# Patient Record
Sex: MALE | Race: BLACK OR AFRICAN AMERICAN | NOT HISPANIC OR LATINO | Employment: FULL TIME | URBAN - METROPOLITAN AREA
[De-identification: names, ages, dates, MRNs, and addresses within clinical notes are randomized per-mention and may not be internally consistent; named-entity substitution may affect disease eponyms.]

---

## 2021-05-06 ENCOUNTER — PATIENT MESSAGE (OUTPATIENT)
Dept: RESEARCH | Facility: HOSPITAL | Age: 41
End: 2021-05-06

## 2021-07-01 ENCOUNTER — PATIENT MESSAGE (OUTPATIENT)
Dept: ADMINISTRATIVE | Facility: OTHER | Age: 41
End: 2021-07-01

## 2021-11-15 PROBLEM — S39.012A STRAIN OF LUMBAR PARASPINAL MUSCLE, INITIAL ENCOUNTER: Status: ACTIVE | Noted: 2021-11-15

## 2021-11-15 PROBLEM — M54.16 LUMBAR RADICULOPATHY, ACUTE: Status: ACTIVE | Noted: 2021-11-15

## 2021-12-16 ENCOUNTER — HOSPITAL ENCOUNTER (EMERGENCY)
Facility: HOSPITAL | Age: 41
Discharge: HOME | End: 2021-12-16
Attending: STUDENT IN AN ORGANIZED HEALTH CARE EDUCATION/TRAINING PROGRAM
Payer: MEDICAID

## 2021-12-16 VITALS
WEIGHT: 315 LBS | RESPIRATION RATE: 18 BRPM | OXYGEN SATURATION: 99 % | TEMPERATURE: 98 F | BODY MASS INDEX: 73.94 KG/M2 | DIASTOLIC BLOOD PRESSURE: 80 MMHG | HEART RATE: 91 BPM | SYSTOLIC BLOOD PRESSURE: 149 MMHG

## 2021-12-16 DIAGNOSIS — R06.02 SOB (SHORTNESS OF BREATH): Primary | ICD-10-CM

## 2021-12-16 DIAGNOSIS — J40 BRONCHITIS: ICD-10-CM

## 2021-12-16 LAB
ALBUMIN SERPL BCP-MCNC: 3.4 G/DL (ref 3.5–5.2)
ALP SERPL-CCNC: 86 U/L (ref 55–135)
ALT SERPL W/O P-5'-P-CCNC: 41 U/L (ref 10–44)
ANION GAP SERPL CALC-SCNC: 7 MMOL/L (ref 8–16)
AST SERPL-CCNC: 17 U/L (ref 10–40)
BASOPHILS # BLD AUTO: 0.06 K/UL (ref 0–0.2)
BASOPHILS NFR BLD: 0.9 % (ref 0–1.9)
BILIRUB SERPL-MCNC: 0.3 MG/DL (ref 0.1–1)
BILIRUB UR QL STRIP: NEGATIVE
BNP SERPL-MCNC: <10 PG/ML (ref 0–99)
BUN SERPL-MCNC: 13 MG/DL (ref 6–20)
CALCIUM SERPL-MCNC: 9.1 MG/DL (ref 8.7–10.5)
CHLORIDE SERPL-SCNC: 105 MMOL/L (ref 95–110)
CLARITY UR: CLEAR
CO2 SERPL-SCNC: 28 MMOL/L (ref 23–29)
COLOR UR: YELLOW
CREAT SERPL-MCNC: 1.1 MG/DL (ref 0.5–1.4)
DIFFERENTIAL METHOD: ABNORMAL
EOSINOPHIL # BLD AUTO: 0.2 K/UL (ref 0–0.5)
EOSINOPHIL NFR BLD: 2.5 % (ref 0–8)
ERYTHROCYTE [DISTWIDTH] IN BLOOD BY AUTOMATED COUNT: 13.2 % (ref 11.5–14.5)
EST. GFR  (AFRICAN AMERICAN): >60 ML/MIN/1.73 M^2
EST. GFR  (NON AFRICAN AMERICAN): >60 ML/MIN/1.73 M^2
GLUCOSE SERPL-MCNC: 213 MG/DL (ref 70–110)
GLUCOSE UR QL STRIP: NEGATIVE
HCT VFR BLD AUTO: 43.2 % (ref 40–54)
HGB BLD-MCNC: 13.6 G/DL (ref 14–18)
HGB UR QL STRIP: ABNORMAL
IMM GRANULOCYTES # BLD AUTO: 0.03 K/UL (ref 0–0.04)
IMM GRANULOCYTES NFR BLD AUTO: 0.4 % (ref 0–0.5)
INFLUENZA A, MOLECULAR: NEGATIVE
INFLUENZA B, MOLECULAR: NEGATIVE
KETONES UR QL STRIP: NEGATIVE
LEUKOCYTE ESTERASE UR QL STRIP: NEGATIVE
LYMPHOCYTES # BLD AUTO: 2.2 K/UL (ref 1–4.8)
LYMPHOCYTES NFR BLD: 31.5 % (ref 18–48)
MCH RBC QN AUTO: 28.4 PG (ref 27–31)
MCHC RBC AUTO-ENTMCNC: 31.5 G/DL (ref 32–36)
MCV RBC AUTO: 90 FL (ref 82–98)
MONOCYTES # BLD AUTO: 0.5 K/UL (ref 0.3–1)
MONOCYTES NFR BLD: 7.3 % (ref 4–15)
NEUTROPHILS # BLD AUTO: 4 K/UL (ref 1.8–7.7)
NEUTROPHILS NFR BLD: 57.4 % (ref 38–73)
NITRITE UR QL STRIP: NEGATIVE
NRBC BLD-RTO: 0 /100 WBC
PH UR STRIP: 6 [PH] (ref 5–8)
PLATELET # BLD AUTO: 225 K/UL (ref 150–450)
PMV BLD AUTO: 10.4 FL (ref 9.2–12.9)
POTASSIUM SERPL-SCNC: 4.2 MMOL/L (ref 3.5–5.1)
PROT SERPL-MCNC: 7.1 G/DL (ref 6–8.4)
PROT UR QL STRIP: NEGATIVE
RBC # BLD AUTO: 4.79 M/UL (ref 4.6–6.2)
SARS-COV-2 RDRP RESP QL NAA+PROBE: NEGATIVE
SODIUM SERPL-SCNC: 140 MMOL/L (ref 136–145)
SP GR UR STRIP: 1.02 (ref 1–1.03)
SPECIMEN SOURCE: NORMAL
URN SPEC COLLECT METH UR: ABNORMAL
UROBILINOGEN UR STRIP-ACNC: NEGATIVE EU/DL
WBC # BLD AUTO: 6.88 K/UL (ref 3.9–12.7)

## 2021-12-16 PROCEDURE — 99285 EMERGENCY DEPT VISIT HI MDM: CPT | Mod: 25

## 2021-12-16 PROCEDURE — 87502 INFLUENZA DNA AMP PROBE: CPT | Performed by: STUDENT IN AN ORGANIZED HEALTH CARE EDUCATION/TRAINING PROGRAM

## 2021-12-16 PROCEDURE — 85025 COMPLETE CBC W/AUTO DIFF WBC: CPT | Performed by: NURSE PRACTITIONER

## 2021-12-16 PROCEDURE — U0002 COVID-19 LAB TEST NON-CDC: HCPCS | Performed by: STUDENT IN AN ORGANIZED HEALTH CARE EDUCATION/TRAINING PROGRAM

## 2021-12-16 PROCEDURE — 93005 ELECTROCARDIOGRAM TRACING: CPT

## 2021-12-16 PROCEDURE — 81003 URINALYSIS AUTO W/O SCOPE: CPT | Performed by: STUDENT IN AN ORGANIZED HEALTH CARE EDUCATION/TRAINING PROGRAM

## 2021-12-16 PROCEDURE — 93010 EKG 12-LEAD: ICD-10-PCS | Mod: ,,, | Performed by: INTERNAL MEDICINE

## 2021-12-16 PROCEDURE — 83880 ASSAY OF NATRIURETIC PEPTIDE: CPT | Performed by: NURSE PRACTITIONER

## 2021-12-16 PROCEDURE — 36415 COLL VENOUS BLD VENIPUNCTURE: CPT | Performed by: NURSE PRACTITIONER

## 2021-12-16 PROCEDURE — 93010 ELECTROCARDIOGRAM REPORT: CPT | Mod: ,,, | Performed by: INTERNAL MEDICINE

## 2021-12-16 PROCEDURE — 80053 COMPREHEN METABOLIC PANEL: CPT | Performed by: NURSE PRACTITIONER

## 2021-12-16 RX ORDER — PREDNISONE 50 MG/1
50 TABLET ORAL DAILY
Qty: 5 TABLET | Refills: 0 | Status: SHIPPED | OUTPATIENT
Start: 2021-12-16 | End: 2021-12-21

## 2021-12-16 RX ORDER — IBUPROFEN 600 MG/1
600 TABLET ORAL EVERY 6 HOURS PRN
Qty: 20 TABLET | Refills: 0 | Status: SHIPPED | OUTPATIENT
Start: 2021-12-16 | End: 2021-12-16 | Stop reason: SDUPTHER

## 2021-12-16 RX ORDER — IBUPROFEN 600 MG/1
600 TABLET ORAL EVERY 6 HOURS PRN
Qty: 20 TABLET | Refills: 0 | Status: ON HOLD | OUTPATIENT
Start: 2021-12-16 | End: 2022-02-09 | Stop reason: HOSPADM

## 2021-12-30 ENCOUNTER — HOSPITAL ENCOUNTER (EMERGENCY)
Facility: HOSPITAL | Age: 41
Discharge: HOME | End: 2021-12-30
Attending: STUDENT IN AN ORGANIZED HEALTH CARE EDUCATION/TRAINING PROGRAM
Payer: MEDICAID

## 2021-12-30 VITALS
OXYGEN SATURATION: 98 % | BODY MASS INDEX: 73.58 KG/M2 | DIASTOLIC BLOOD PRESSURE: 95 MMHG | RESPIRATION RATE: 22 BRPM | HEART RATE: 106 BPM | WEIGHT: 315 LBS | TEMPERATURE: 98 F | SYSTOLIC BLOOD PRESSURE: 178 MMHG

## 2021-12-30 DIAGNOSIS — R79.89 ELEVATED D-DIMER: ICD-10-CM

## 2021-12-30 DIAGNOSIS — R06.02 SHORTNESS OF BREATH: ICD-10-CM

## 2021-12-30 LAB
ALBUMIN SERPL BCP-MCNC: 4 G/DL (ref 3.5–5.2)
ALP SERPL-CCNC: 109 U/L (ref 55–135)
ALT SERPL W/O P-5'-P-CCNC: 65 U/L (ref 10–44)
ANION GAP SERPL CALC-SCNC: 11 MMOL/L (ref 8–16)
AST SERPL-CCNC: 18 U/L (ref 10–40)
BASOPHILS # BLD AUTO: 0.06 K/UL (ref 0–0.2)
BASOPHILS NFR BLD: 0.6 % (ref 0–1.9)
BILIRUB SERPL-MCNC: 0.5 MG/DL (ref 0.1–1)
BNP SERPL-MCNC: 13 PG/ML (ref 0–99)
BUN SERPL-MCNC: 14 MG/DL (ref 6–20)
CALCIUM SERPL-MCNC: 10.2 MG/DL (ref 8.7–10.5)
CHLORIDE SERPL-SCNC: 103 MMOL/L (ref 95–110)
CO2 SERPL-SCNC: 27 MMOL/L (ref 23–29)
CREAT SERPL-MCNC: 1.2 MG/DL (ref 0.5–1.4)
D DIMER PPP IA.FEU-MCNC: 1.64 MG/L FEU
DIFFERENTIAL METHOD: NORMAL
EOSINOPHIL # BLD AUTO: 0.1 K/UL (ref 0–0.5)
EOSINOPHIL NFR BLD: 1.3 % (ref 0–8)
ERYTHROCYTE [DISTWIDTH] IN BLOOD BY AUTOMATED COUNT: 13.4 % (ref 11.5–14.5)
EST. GFR  (AFRICAN AMERICAN): >60 ML/MIN/1.73 M^2
EST. GFR  (NON AFRICAN AMERICAN): >60 ML/MIN/1.73 M^2
GLUCOSE SERPL-MCNC: 94 MG/DL (ref 70–110)
HCT VFR BLD AUTO: 46.2 % (ref 40–54)
HGB BLD-MCNC: 14.8 G/DL (ref 14–18)
IMM GRANULOCYTES # BLD AUTO: 0.04 K/UL (ref 0–0.04)
IMM GRANULOCYTES NFR BLD AUTO: 0.4 % (ref 0–0.5)
INFLUENZA A, MOLECULAR: NEGATIVE
INFLUENZA B, MOLECULAR: NEGATIVE
LYMPHOCYTES # BLD AUTO: 2.5 K/UL (ref 1–4.8)
LYMPHOCYTES NFR BLD: 23.5 % (ref 18–48)
MCH RBC QN AUTO: 28.6 PG (ref 27–31)
MCHC RBC AUTO-ENTMCNC: 32 G/DL (ref 32–36)
MCV RBC AUTO: 89 FL (ref 82–98)
MONOCYTES # BLD AUTO: 0.7 K/UL (ref 0.3–1)
MONOCYTES NFR BLD: 6.8 % (ref 4–15)
NEUTROPHILS # BLD AUTO: 7.1 K/UL (ref 1.8–7.7)
NEUTROPHILS NFR BLD: 67.4 % (ref 38–73)
NRBC BLD-RTO: 0 /100 WBC
PLATELET # BLD AUTO: 280 K/UL (ref 150–450)
PMV BLD AUTO: 10.5 FL (ref 9.2–12.9)
POTASSIUM SERPL-SCNC: 4.4 MMOL/L (ref 3.5–5.1)
PROT SERPL-MCNC: 8.3 G/DL (ref 6–8.4)
RBC # BLD AUTO: 5.18 M/UL (ref 4.6–6.2)
SARS-COV-2 RDRP RESP QL NAA+PROBE: NEGATIVE
SODIUM SERPL-SCNC: 141 MMOL/L (ref 136–145)
SPECIMEN SOURCE: NORMAL
TROPONIN I SERPL DL<=0.01 NG/ML-MCNC: 0.01 NG/ML (ref 0–0.03)
WBC # BLD AUTO: 10.51 K/UL (ref 3.9–12.7)

## 2021-12-30 PROCEDURE — 80053 COMPREHEN METABOLIC PANEL: CPT | Performed by: STUDENT IN AN ORGANIZED HEALTH CARE EDUCATION/TRAINING PROGRAM

## 2021-12-30 PROCEDURE — U0002 COVID-19 LAB TEST NON-CDC: HCPCS | Performed by: STUDENT IN AN ORGANIZED HEALTH CARE EDUCATION/TRAINING PROGRAM

## 2021-12-30 PROCEDURE — 85025 COMPLETE CBC W/AUTO DIFF WBC: CPT | Performed by: STUDENT IN AN ORGANIZED HEALTH CARE EDUCATION/TRAINING PROGRAM

## 2021-12-30 PROCEDURE — 99900035 HC TECH TIME PER 15 MIN (STAT)

## 2021-12-30 PROCEDURE — 85379 FIBRIN DEGRADATION QUANT: CPT | Performed by: STUDENT IN AN ORGANIZED HEALTH CARE EDUCATION/TRAINING PROGRAM

## 2021-12-30 PROCEDURE — 96372 THER/PROPH/DIAG INJ SC/IM: CPT

## 2021-12-30 PROCEDURE — 84484 ASSAY OF TROPONIN QUANT: CPT | Performed by: STUDENT IN AN ORGANIZED HEALTH CARE EDUCATION/TRAINING PROGRAM

## 2021-12-30 PROCEDURE — 63600175 PHARM REV CODE 636 W HCPCS: Performed by: STUDENT IN AN ORGANIZED HEALTH CARE EDUCATION/TRAINING PROGRAM

## 2021-12-30 PROCEDURE — 87502 INFLUENZA DNA AMP PROBE: CPT | Performed by: STUDENT IN AN ORGANIZED HEALTH CARE EDUCATION/TRAINING PROGRAM

## 2021-12-30 PROCEDURE — 83880 ASSAY OF NATRIURETIC PEPTIDE: CPT | Performed by: STUDENT IN AN ORGANIZED HEALTH CARE EDUCATION/TRAINING PROGRAM

## 2021-12-30 PROCEDURE — 93005 ELECTROCARDIOGRAM TRACING: CPT

## 2021-12-30 PROCEDURE — 99285 EMERGENCY DEPT VISIT HI MDM: CPT | Mod: 25

## 2021-12-30 PROCEDURE — 94640 AIRWAY INHALATION TREATMENT: CPT

## 2021-12-30 PROCEDURE — 93010 ELECTROCARDIOGRAM REPORT: CPT | Mod: ,,, | Performed by: INTERNAL MEDICINE

## 2021-12-30 PROCEDURE — 93010 EKG 12-LEAD: ICD-10-PCS | Mod: ,,, | Performed by: INTERNAL MEDICINE

## 2021-12-30 PROCEDURE — 25000242 PHARM REV CODE 250 ALT 637 W/ HCPCS: Performed by: STUDENT IN AN ORGANIZED HEALTH CARE EDUCATION/TRAINING PROGRAM

## 2021-12-30 PROCEDURE — 99900031 HC PATIENT EDUCATION (STAT)

## 2021-12-30 RX ORDER — IPRATROPIUM BROMIDE AND ALBUTEROL SULFATE 2.5; .5 MG/3ML; MG/3ML
3 SOLUTION RESPIRATORY (INHALATION)
Status: COMPLETED | OUTPATIENT
Start: 2021-12-30 | End: 2021-12-30

## 2021-12-30 RX ORDER — METHYLPREDNISOLONE SOD SUCC 125 MG
125 VIAL (EA) INJECTION
Status: COMPLETED | OUTPATIENT
Start: 2021-12-30 | End: 2021-12-30

## 2021-12-30 RX ADMIN — METHYLPREDNISOLONE SODIUM SUCCINATE 125 MG: 125 INJECTION, POWDER, FOR SOLUTION INTRAMUSCULAR; INTRAVENOUS at 05:12

## 2021-12-30 RX ADMIN — IPRATROPIUM BROMIDE AND ALBUTEROL SULFATE 3 ML: .5; 3 SOLUTION RESPIRATORY (INHALATION) at 05:12

## 2021-12-30 RX ADMIN — IPRATROPIUM BROMIDE AND ALBUTEROL SULFATE 3 ML: .5; 3 SOLUTION RESPIRATORY (INHALATION) at 04:12

## 2021-12-30 NOTE — ED PROVIDER NOTES
"[facility]                                                  Chief Complaint     Chief Complaint   Patient presents with    Shortness of Breath     Pt states he was told he had "untreated pneumonia or bronchitis" a week ago - reports he does not feel he is getting better      Back Pain       History of Present Illness  41 y.o. male with past medical history of hypertension and morbid obesity who presents with R mid back pain and shortness of breath x 1 week. He was seen at [facility] on 12/14 and CT-PE study was obtained, revealing no evidence of PE. He presented here to [facility] on 12/16 for re-evaluation with similar symptoms. He was given steroid burst and anti-inflammatory medications. Since then, his symptoms have persisted.  His shortness of breath is intermittent, worsens with exertion and has been worsening since onset. His back pain is 10/10 in severity, intermittent, aching. His back pain improves when he lies on his stomach. He states that the back pain this feels like his previous kidney stones and endorses associated increased urinary frequency for the past 3 days. Denies fever, chills, nausea, vomiting, abdominal pain, constipation, or bloody stools.      History obtained from:  Patient    Review of patient's allergies indicates:  No Known Allergies  Past Medical History:   Diagnosis Date    Hypertension      Past Surgical History:   Procedure Laterality Date    ANKLE SURGERY Left       Family History   Problem Relation Age of Onset    Diabetes Mellitus Mother     Diabetes Mellitus Father     Hypertension Father     Stroke Paternal Grandfather      Social History     Tobacco Use    Smoking status: Current Every Day Smoker     Years: 19.00     Types: Cigars     Start date: 3/4/1996    Smokeless tobacco: Never Used    Tobacco comment: pt stated smokes 5 cigars/day   Substance Use Topics    Alcohol use: Yes     Alcohol/week: 0.0 standard drinks     Comment: " occasional    Drug use: No     Review of Systems and Physical Exam     +back pain, shortness of breath, urinary frequency    All other symptoms negative as mentioned below    Review of Systems  --Constitutional - Denies fever, appetite change, chills  --Eyes - Denies eye discharge, eye pain, eye redness or visual disturbance  --HENT- Denies congestion, sore throat, drooling, ear discharge, rhinorrhea or trouble swallowing  --Respiratory - Denies cough, wheezing  --Cardiovascular - Denies chest pain, leg swelling, palpitations  --Gastrointestinal - Denies abdominal pain, abdominal distension, constipation, diarrhea, nausea or vomiting  --Genitourinary - Denies difficulty urinating, dysuria, hematuria, urgency  --Musculoskeletal - Denies arthralgias, myalgias  --Neurological - Denies dizziness, headaches, lightheadedness, weakness  --Hematologic - Denies easy bruising or easy bleeding  --Skin - Denies rash, wound or pallor  --Psychiatric- Denies dysphoric mood, nervousness/anxiety    Vital Signs   weight is 226 kg (498 lb 3.8 oz) (abnormal). His temperature is 97.9 °F (36.6 °C). His blood pressure is 178/95 (abnormal) and his pulse is 106. His respiration is 22 (abnormal) and oxygen saturation is 98%.      Physical Exam   Nursing note and vitals reviewed  --Constitutional:  Well developed, well nourished  --HENT: Normocephalic, atraumatic. No rhinorrhea. Moist oral mucosa. No oropharyngeal edema, erythema or exudates.   --Eyes: PERRL. Extraocular movements intact. No periorbital swelling. Normal conjunctiva.  --Neck: Normal range of motion. Neck supple. No adenopathy  --Cardiac: Regular rhythm, normal S1, normal S2, no murmur, normal rate, intact distal pulses  --Pulmonary: Normal respiratory effort, breath sounds normal and equal bilaterally, no accessory muscle use, no respiratory distress  --Abdominal: Soft, normal bowel sounds, no tenderness, no guarding, no rebound  --Musculoskeletal: Normal range of motion. No  deformity, tenderness or edema.  --Neurological:  Alert and oriented x 4. Follows commands appropriately.    --Skin: Warm and dry. No rash, pallor, cyanosis or jaundice.  --Psych: Normal mood    ED Course     Lab Results (reviewed by the physician)  Labs Reviewed   D DIMER, QUANTITATIVE - Abnormal; Notable for the following components:       Result Value    D-Dimer 1.64 (*)     All other components within normal limits   COMPREHENSIVE METABOLIC PANEL - Abnormal; Notable for the following components:    ALT 65 (*)     All other components within normal limits   INFLUENZA A & B BY MOLECULAR   SARS-COV-2 RNA AMPLIFICATION, QUAL   B-TYPE NATRIURETIC PEPTIDE   TROPONIN I   CBC W/ AUTO DIFFERENTIAL     Vent. Rate : 101 BPM     Atrial Rate : 101 BPM      P-R Int : 144 ms          QRS Dur : 082 ms       QT Int : 394 ms       P-R-T Axes : 041 057 027 degrees      QTc Int : 510 ms     Sinus tachycardia   Otherwise normal ECG   When compared with ECG of 16-DEC-2021 14:15,     Nonspecific T wave abnormality, improved in Inferior leads   Nonspecific T wave abnormality has replaced inverted T waves in Lateral   leads     EKG (interpreted by the physician)  Rate: 101 bpm  Rhythm: Sinus Tachycardia  Axis: Normal  QTc interval: 510 ms  ST-segments: No elevation or depression  Overall Interpretation: Sinus Tachycardia    Radiology (images visualized & reports reviewed by the physician)  X-Ray Chest 1 View   Final Result      No acute process.         Electronically signed by: Eduard Pantoja MD   Date:    12/30/2021   Time:    17:24          Medications Given  Medications   methylPREDNISolone sodium succinate injection 125 mg (125 mg Intramuscular Given 12/30/21 1707)   albuterol-ipratropium 2.5 mg-0.5 mg/3 mL nebulizer solution 3 mL (3 mLs Nebulization Given by Other 12/30/21 1708)       Differential Diagnosis:  Asthma, COPD,  cardiogenic pulmonary edema, noncardiogenic pulmonary edema, pneumonia, myocardial infarction, cardiac  tamponade    Clinical Tests:  Lab Tests: Ordered and reviewed  Radiological Study: Ordered and reviewed  Medical Tests: Ordered and reviewed    ED Management  Vitals with hypertension, tachypnea and tachycardia. Physical exam with clear lung sounds, normal work of breathing and no respiratory distress. Labs with elevated d-dimer but otherwise unremarkable. CXR unremarkable. Considering that patient could not fit in the CT-Scanner, bilateral lower extremity ultrasound was obtained to evaluate for DVT and assess need for anticoagulation. Care was handed off to Dr. Ramos.     Disposition and Plan  Condition: Stable  Disposition: Handed off to Dr. Rachel Argueta MD  01/05/22 0128

## 2021-12-30 NOTE — ED TRIAGE NOTES
41 y.o. male presents to ER   Chief Complaint   Patient presents with    Shortness of Breath     Pt recently dx with pneumonia a week ago - reports he does not feel he is getting better      Back Pain   Reports pain to R side of back. No acute distress noted.

## 2022-01-05 ENCOUNTER — PATIENT OUTREACH (OUTPATIENT)
Dept: EMERGENCY MEDICINE | Facility: HOSPITAL | Age: 42
End: 2022-01-05
Payer: MEDICAID

## 2022-01-05 NOTE — PROGRESS NOTES
Patient stated he is actually going get some tests done tomorrow. Patient declined ED navigation assessment and denied any needs/resources at this time.     Aysha More  ED Navigator- [place]

## 2022-02-06 ENCOUNTER — HOSPITAL ENCOUNTER (INPATIENT)
Facility: HOSPITAL | Age: 42
LOS: 3 days | Discharge: HOME | DRG: 300 | End: 2022-02-09
Attending: STUDENT IN AN ORGANIZED HEALTH CARE EDUCATION/TRAINING PROGRAM | Admitting: FAMILY MEDICINE
Payer: MEDICAID

## 2022-02-06 DIAGNOSIS — I82.453 ACUTE DEEP VEIN THROMBOSIS (DVT) OF BOTH PERONEAL VEINS: Primary | ICD-10-CM

## 2022-02-06 DIAGNOSIS — R79.89 ELEVATED D-DIMER: ICD-10-CM

## 2022-02-06 DIAGNOSIS — R06.02 SHORTNESS OF BREATH: ICD-10-CM

## 2022-02-06 DIAGNOSIS — R06.02 SOB (SHORTNESS OF BREATH): ICD-10-CM

## 2022-02-06 LAB
BASOPHILS # BLD AUTO: 0.04 K/UL (ref 0–0.2)
BASOPHILS NFR BLD: 0.4 % (ref 0–1.9)
BNP SERPL-MCNC: 29 PG/ML (ref 0–99)
DIFFERENTIAL METHOD: ABNORMAL
EOSINOPHIL # BLD AUTO: 0.2 K/UL (ref 0–0.5)
EOSINOPHIL NFR BLD: 1.8 % (ref 0–8)
ERYTHROCYTE [DISTWIDTH] IN BLOOD BY AUTOMATED COUNT: 13.9 % (ref 11.5–14.5)
HCT VFR BLD AUTO: 45.2 % (ref 40–54)
HGB BLD-MCNC: 14.4 G/DL (ref 14–18)
IMM GRANULOCYTES # BLD AUTO: 0.04 K/UL (ref 0–0.04)
IMM GRANULOCYTES NFR BLD AUTO: 0.4 % (ref 0–0.5)
INFLUENZA A, MOLECULAR: NEGATIVE
INFLUENZA B, MOLECULAR: NEGATIVE
LYMPHOCYTES # BLD AUTO: 2.4 K/UL (ref 1–4.8)
LYMPHOCYTES NFR BLD: 25.3 % (ref 18–48)
MCH RBC QN AUTO: 28.2 PG (ref 27–31)
MCHC RBC AUTO-ENTMCNC: 31.9 G/DL (ref 32–36)
MCV RBC AUTO: 89 FL (ref 82–98)
MONOCYTES # BLD AUTO: 0.6 K/UL (ref 0.3–1)
MONOCYTES NFR BLD: 6.3 % (ref 4–15)
NEUTROPHILS # BLD AUTO: 6.2 K/UL (ref 1.8–7.7)
NEUTROPHILS NFR BLD: 65.8 % (ref 38–73)
NRBC BLD-RTO: 0 /100 WBC
PLATELET # BLD AUTO: 287 K/UL (ref 150–450)
PMV BLD AUTO: 11.3 FL (ref 9.2–12.9)
RBC # BLD AUTO: 5.11 M/UL (ref 4.6–6.2)
SARS-COV-2 RDRP RESP QL NAA+PROBE: NEGATIVE
SPECIMEN SOURCE: NORMAL
WBC # BLD AUTO: 9.45 K/UL (ref 3.9–12.7)

## 2022-02-06 PROCEDURE — 96372 THER/PROPH/DIAG INJ SC/IM: CPT | Mod: 59

## 2022-02-06 PROCEDURE — 83880 ASSAY OF NATRIURETIC PEPTIDE: CPT | Performed by: STUDENT IN AN ORGANIZED HEALTH CARE EDUCATION/TRAINING PROGRAM

## 2022-02-06 PROCEDURE — 99291 CRITICAL CARE FIRST HOUR: CPT | Mod: 25

## 2022-02-06 PROCEDURE — 63600175 PHARM REV CODE 636 W HCPCS: Performed by: STUDENT IN AN ORGANIZED HEALTH CARE EDUCATION/TRAINING PROGRAM

## 2022-02-06 PROCEDURE — 80053 COMPREHEN METABOLIC PANEL: CPT | Performed by: STUDENT IN AN ORGANIZED HEALTH CARE EDUCATION/TRAINING PROGRAM

## 2022-02-06 PROCEDURE — 93010 ELECTROCARDIOGRAM REPORT: CPT | Mod: ,,, | Performed by: INTERNAL MEDICINE

## 2022-02-06 PROCEDURE — 84484 ASSAY OF TROPONIN QUANT: CPT | Performed by: STUDENT IN AN ORGANIZED HEALTH CARE EDUCATION/TRAINING PROGRAM

## 2022-02-06 PROCEDURE — U0002 COVID-19 LAB TEST NON-CDC: HCPCS | Performed by: STUDENT IN AN ORGANIZED HEALTH CARE EDUCATION/TRAINING PROGRAM

## 2022-02-06 PROCEDURE — 87502 INFLUENZA DNA AMP PROBE: CPT | Performed by: STUDENT IN AN ORGANIZED HEALTH CARE EDUCATION/TRAINING PROGRAM

## 2022-02-06 PROCEDURE — 93010 EKG 12-LEAD: ICD-10-PCS | Mod: ,,, | Performed by: INTERNAL MEDICINE

## 2022-02-06 PROCEDURE — 85379 FIBRIN DEGRADATION QUANT: CPT | Performed by: STUDENT IN AN ORGANIZED HEALTH CARE EDUCATION/TRAINING PROGRAM

## 2022-02-06 PROCEDURE — 96374 THER/PROPH/DIAG INJ IV PUSH: CPT

## 2022-02-06 PROCEDURE — 93005 ELECTROCARDIOGRAM TRACING: CPT

## 2022-02-06 PROCEDURE — 11000001 HC ACUTE MED/SURG PRIVATE ROOM

## 2022-02-06 PROCEDURE — 85025 COMPLETE CBC W/AUTO DIFF WBC: CPT | Performed by: STUDENT IN AN ORGANIZED HEALTH CARE EDUCATION/TRAINING PROGRAM

## 2022-02-06 RX ORDER — ALBUTEROL SULFATE 90 UG/1
8 AEROSOL, METERED RESPIRATORY (INHALATION) EVERY 6 HOURS PRN
Status: DISCONTINUED | OUTPATIENT
Start: 2022-02-06 | End: 2022-02-09 | Stop reason: HOSPADM

## 2022-02-06 RX ORDER — METHYLPREDNISOLONE SOD SUCC 125 MG
125 VIAL (EA) INJECTION
Status: COMPLETED | OUTPATIENT
Start: 2022-02-06 | End: 2022-02-06

## 2022-02-06 RX ADMIN — METHYLPREDNISOLONE SODIUM SUCCINATE 125 MG: 125 INJECTION, POWDER, FOR SOLUTION INTRAMUSCULAR; INTRAVENOUS at 11:02

## 2022-02-07 PROBLEM — I82.403 ACUTE DEEP VEIN THROMBOSIS (DVT) OF BOTH LOWER EXTREMITIES: Status: ACTIVE | Noted: 2022-02-07

## 2022-02-07 PROBLEM — U07.1 COVID-19: Status: ACTIVE | Noted: 2022-02-07

## 2022-02-07 LAB
ALBUMIN SERPL BCP-MCNC: 3.6 G/DL (ref 3.5–5.2)
ALP SERPL-CCNC: 92 U/L (ref 55–135)
ALT SERPL W/O P-5'-P-CCNC: 47 U/L (ref 10–44)
ANION GAP SERPL CALC-SCNC: 11 MMOL/L (ref 8–16)
ANION GAP SERPL CALC-SCNC: 9 MMOL/L (ref 8–16)
APTT BLDCRRT: 29.7 SEC (ref 21–32)
APTT BLDCRRT: 71.2 SEC (ref 21–32)
AST SERPL-CCNC: 21 U/L (ref 10–40)
BASOPHILS # BLD AUTO: 0.03 K/UL (ref 0–0.2)
BASOPHILS NFR BLD: 0.3 % (ref 0–1.9)
BILIRUB SERPL-MCNC: 0.4 MG/DL (ref 0.1–1)
BUN SERPL-MCNC: 11 MG/DL (ref 6–20)
BUN SERPL-MCNC: 11 MG/DL (ref 6–20)
CALCIUM SERPL-MCNC: 9.4 MG/DL (ref 8.7–10.5)
CALCIUM SERPL-MCNC: 9.6 MG/DL (ref 8.7–10.5)
CHLORIDE SERPL-SCNC: 103 MMOL/L (ref 95–110)
CHLORIDE SERPL-SCNC: 106 MMOL/L (ref 95–110)
CO2 SERPL-SCNC: 24 MMOL/L (ref 23–29)
CO2 SERPL-SCNC: 25 MMOL/L (ref 23–29)
CREAT SERPL-MCNC: 1 MG/DL (ref 0.5–1.4)
CREAT SERPL-MCNC: 1.1 MG/DL (ref 0.5–1.4)
D DIMER PPP IA.FEU-MCNC: 2.35 MG/L FEU
DIFFERENTIAL METHOD: ABNORMAL
EOSINOPHIL # BLD AUTO: 0 K/UL (ref 0–0.5)
EOSINOPHIL NFR BLD: 0.1 % (ref 0–8)
ERYTHROCYTE [DISTWIDTH] IN BLOOD BY AUTOMATED COUNT: 13.3 % (ref 11.5–14.5)
EST. GFR  (AFRICAN AMERICAN): >60 ML/MIN/1.73 M^2
EST. GFR  (AFRICAN AMERICAN): >60 ML/MIN/1.73 M^2
EST. GFR  (NON AFRICAN AMERICAN): >60 ML/MIN/1.73 M^2
EST. GFR  (NON AFRICAN AMERICAN): >60 ML/MIN/1.73 M^2
GLUCOSE SERPL-MCNC: 137 MG/DL (ref 70–110)
GLUCOSE SERPL-MCNC: 177 MG/DL (ref 70–110)
HCT VFR BLD AUTO: 43.3 % (ref 40–54)
HGB BLD-MCNC: 13.8 G/DL (ref 14–18)
IMM GRANULOCYTES # BLD AUTO: 0.05 K/UL (ref 0–0.04)
IMM GRANULOCYTES NFR BLD AUTO: 0.5 % (ref 0–0.5)
INR PPP: 1 (ref 0.8–1.2)
LYMPHOCYTES # BLD AUTO: 0.8 K/UL (ref 1–4.8)
LYMPHOCYTES NFR BLD: 8.7 % (ref 18–48)
MCH RBC QN AUTO: 28.3 PG (ref 27–31)
MCHC RBC AUTO-ENTMCNC: 31.9 G/DL (ref 32–36)
MCV RBC AUTO: 89 FL (ref 82–98)
MONOCYTES # BLD AUTO: 0.1 K/UL (ref 0.3–1)
MONOCYTES NFR BLD: 0.6 % (ref 4–15)
NEUTROPHILS # BLD AUTO: 8.7 K/UL (ref 1.8–7.7)
NEUTROPHILS NFR BLD: 89.8 % (ref 38–73)
NRBC BLD-RTO: 0 /100 WBC
PLATELET # BLD AUTO: 244 K/UL (ref 150–450)
PMV BLD AUTO: 10.5 FL (ref 9.2–12.9)
POTASSIUM SERPL-SCNC: 4.2 MMOL/L (ref 3.5–5.1)
POTASSIUM SERPL-SCNC: 4.5 MMOL/L (ref 3.5–5.1)
PROT SERPL-MCNC: 7.3 G/DL (ref 6–8.4)
PROTHROMBIN TIME: 10.8 SEC (ref 9–12.5)
RBC # BLD AUTO: 4.88 M/UL (ref 4.6–6.2)
SARS-COV-2 RNA RESP QL NAA+PROBE: NOT DETECTED
SARS-COV-2- CYCLE NUMBER: NORMAL
SODIUM SERPL-SCNC: 138 MMOL/L (ref 136–145)
SODIUM SERPL-SCNC: 140 MMOL/L (ref 136–145)
TROPONIN I SERPL DL<=0.01 NG/ML-MCNC: 0.01 NG/ML (ref 0–0.03)
WBC # BLD AUTO: 9.66 K/UL (ref 3.9–12.7)

## 2022-02-07 PROCEDURE — U0005 INFEC AGEN DETEC AMPLI PROBE: HCPCS | Performed by: STUDENT IN AN ORGANIZED HEALTH CARE EDUCATION/TRAINING PROGRAM

## 2022-02-07 PROCEDURE — 36415 COLL VENOUS BLD VENIPUNCTURE: CPT | Performed by: FAMILY MEDICINE

## 2022-02-07 PROCEDURE — 85730 THROMBOPLASTIN TIME PARTIAL: CPT | Performed by: NURSE PRACTITIONER

## 2022-02-07 PROCEDURE — 25000003 PHARM REV CODE 250: Performed by: STUDENT IN AN ORGANIZED HEALTH CARE EDUCATION/TRAINING PROGRAM

## 2022-02-07 PROCEDURE — 63600175 PHARM REV CODE 636 W HCPCS: Performed by: STUDENT IN AN ORGANIZED HEALTH CARE EDUCATION/TRAINING PROGRAM

## 2022-02-07 PROCEDURE — 25000003 PHARM REV CODE 250: Performed by: NURSE PRACTITIONER

## 2022-02-07 PROCEDURE — 80048 BASIC METABOLIC PNL TOTAL CA: CPT | Performed by: STUDENT IN AN ORGANIZED HEALTH CARE EDUCATION/TRAINING PROGRAM

## 2022-02-07 PROCEDURE — 11000001 HC ACUTE MED/SURG PRIVATE ROOM

## 2022-02-07 PROCEDURE — 99222 1ST HOSP IP/OBS MODERATE 55: CPT | Mod: ,,, | Performed by: INTERNAL MEDICINE

## 2022-02-07 PROCEDURE — 63600175 PHARM REV CODE 636 W HCPCS: Performed by: NURSE PRACTITIONER

## 2022-02-07 PROCEDURE — 85025 COMPLETE CBC W/AUTO DIFF WBC: CPT | Performed by: STUDENT IN AN ORGANIZED HEALTH CARE EDUCATION/TRAINING PROGRAM

## 2022-02-07 PROCEDURE — 94761 N-INVAS EAR/PLS OXIMETRY MLT: CPT

## 2022-02-07 PROCEDURE — 36415 COLL VENOUS BLD VENIPUNCTURE: CPT | Performed by: STUDENT IN AN ORGANIZED HEALTH CARE EDUCATION/TRAINING PROGRAM

## 2022-02-07 PROCEDURE — 99222 PR INITIAL HOSPITAL CARE,LEVL II: ICD-10-PCS | Mod: ,,, | Performed by: INTERNAL MEDICINE

## 2022-02-07 PROCEDURE — 85610 PROTHROMBIN TIME: CPT | Performed by: NURSE PRACTITIONER

## 2022-02-07 PROCEDURE — U0003 INFECTIOUS AGENT DETECTION BY NUCLEIC ACID (DNA OR RNA); SEVERE ACUTE RESPIRATORY SYNDROME CORONAVIRUS 2 (SARS-COV-2) (CORONAVIRUS DISEASE [COVID-19]), AMPLIFIED PROBE TECHNIQUE, MAKING USE OF HIGH THROUGHPUT TECHNOLOGIES AS DESCRIBED BY CMS-2020-01-R: HCPCS | Performed by: STUDENT IN AN ORGANIZED HEALTH CARE EDUCATION/TRAINING PROGRAM

## 2022-02-07 PROCEDURE — 85730 THROMBOPLASTIN TIME PARTIAL: CPT | Mod: 91 | Performed by: FAMILY MEDICINE

## 2022-02-07 PROCEDURE — 27000207 HC ISOLATION

## 2022-02-07 PROCEDURE — 36415 COLL VENOUS BLD VENIPUNCTURE: CPT | Performed by: NURSE PRACTITIONER

## 2022-02-07 RX ORDER — TALC
6 POWDER (GRAM) TOPICAL NIGHTLY PRN
Status: DISCONTINUED | OUTPATIENT
Start: 2022-02-07 | End: 2022-02-09 | Stop reason: HOSPADM

## 2022-02-07 RX ORDER — LISINOPRIL 20 MG/1
40 TABLET ORAL
Status: COMPLETED | OUTPATIENT
Start: 2022-02-07 | End: 2022-02-07

## 2022-02-07 RX ORDER — SODIUM CHLORIDE 0.9 % (FLUSH) 0.9 %
10 SYRINGE (ML) INJECTION
Status: DISCONTINUED | OUTPATIENT
Start: 2022-02-07 | End: 2022-02-09 | Stop reason: HOSPADM

## 2022-02-07 RX ORDER — BACLOFEN 10 MG/1
40 TABLET ORAL 2 TIMES DAILY
Status: DISCONTINUED | OUTPATIENT
Start: 2022-02-07 | End: 2022-02-09 | Stop reason: HOSPADM

## 2022-02-07 RX ORDER — HEPARIN SODIUM,PORCINE/D5W 25000/250
0-40 INTRAVENOUS SOLUTION INTRAVENOUS CONTINUOUS
Status: DISCONTINUED | OUTPATIENT
Start: 2022-02-07 | End: 2022-02-09

## 2022-02-07 RX ORDER — HYDROCODONE BITARTRATE AND ACETAMINOPHEN 10; 325 MG/1; MG/1
1 TABLET ORAL EVERY 6 HOURS PRN
Status: DISCONTINUED | OUTPATIENT
Start: 2022-02-07 | End: 2022-02-09 | Stop reason: HOSPADM

## 2022-02-07 RX ORDER — ENOXAPARIN SODIUM 300 MG/3ML
1 INJECTION INTRAVENOUS; SUBCUTANEOUS
Status: DISCONTINUED | OUTPATIENT
Start: 2022-02-07 | End: 2022-02-07

## 2022-02-07 RX ORDER — DEXAMETHASONE SODIUM PHOSPHATE 4 MG/ML
6 INJECTION, SOLUTION INTRA-ARTICULAR; INTRALESIONAL; INTRAMUSCULAR; INTRAVENOUS; SOFT TISSUE DAILY
Status: DISCONTINUED | OUTPATIENT
Start: 2022-02-07 | End: 2022-02-08

## 2022-02-07 RX ORDER — GABAPENTIN 300 MG/1
600 CAPSULE ORAL 3 TIMES DAILY
Status: DISCONTINUED | OUTPATIENT
Start: 2022-02-07 | End: 2022-02-09 | Stop reason: HOSPADM

## 2022-02-07 RX ORDER — FAMOTIDINE 20 MG/1
20 TABLET, FILM COATED ORAL EVERY 12 HOURS
Status: DISCONTINUED | OUTPATIENT
Start: 2022-02-07 | End: 2022-02-09 | Stop reason: HOSPADM

## 2022-02-07 RX ORDER — LISINOPRIL 20 MG/1
40 TABLET ORAL
Status: CANCELLED | OUTPATIENT
Start: 2022-02-07 | End: 2022-02-07

## 2022-02-07 RX ORDER — LISINOPRIL 40 MG/1
40 TABLET ORAL DAILY
Status: DISCONTINUED | OUTPATIENT
Start: 2022-02-07 | End: 2022-02-09 | Stop reason: HOSPADM

## 2022-02-07 RX ORDER — ASCORBIC ACID 500 MG
500 TABLET ORAL 2 TIMES DAILY
Status: DISCONTINUED | OUTPATIENT
Start: 2022-02-07 | End: 2022-02-09 | Stop reason: HOSPADM

## 2022-02-07 RX ORDER — ENOXAPARIN SODIUM 300 MG/3ML
150 INJECTION INTRAVENOUS; SUBCUTANEOUS
Status: COMPLETED | OUTPATIENT
Start: 2022-02-07 | End: 2022-02-07

## 2022-02-07 RX ORDER — TAMSULOSIN HYDROCHLORIDE 0.4 MG/1
1 CAPSULE ORAL DAILY
Status: DISCONTINUED | OUTPATIENT
Start: 2022-02-07 | End: 2022-02-07

## 2022-02-07 RX ADMIN — FAMOTIDINE 20 MG: 20 TABLET ORAL at 11:02

## 2022-02-07 RX ADMIN — BACLOFEN 40 MG: 10 TABLET ORAL at 08:02

## 2022-02-07 RX ADMIN — GABAPENTIN 600 MG: 300 CAPSULE ORAL at 08:02

## 2022-02-07 RX ADMIN — HEPARIN SODIUM 18 UNITS/KG/HR: 10000 INJECTION, SOLUTION INTRAVENOUS at 05:02

## 2022-02-07 RX ADMIN — LISINOPRIL 40 MG: 20 TABLET ORAL at 02:02

## 2022-02-07 RX ADMIN — GABAPENTIN 600 MG: 300 CAPSULE ORAL at 02:02

## 2022-02-07 RX ADMIN — DEXAMETHASONE SODIUM PHOSPHATE 6 MG: 4 INJECTION, SOLUTION INTRA-ARTICULAR; INTRALESIONAL; INTRAMUSCULAR; INTRAVENOUS; SOFT TISSUE at 11:02

## 2022-02-07 RX ADMIN — THERA TABS 1 TABLET: TAB at 11:02

## 2022-02-07 RX ADMIN — OXYCODONE HYDROCHLORIDE AND ACETAMINOPHEN 500 MG: 500 TABLET ORAL at 08:02

## 2022-02-07 RX ADMIN — OXYCODONE HYDROCHLORIDE AND ACETAMINOPHEN 500 MG: 500 TABLET ORAL at 11:02

## 2022-02-07 RX ADMIN — HEPARIN SODIUM 18 UNITS/KG/HR: 10000 INJECTION, SOLUTION INTRAVENOUS at 11:02

## 2022-02-07 RX ADMIN — FAMOTIDINE 20 MG: 20 TABLET ORAL at 08:02

## 2022-02-07 RX ADMIN — ENOXAPARIN SODIUM 150 MG: 300 INJECTION SUBCUTANEOUS at 03:02

## 2022-02-07 NOTE — ASSESSMENT & PLAN NOTE
"US demonstrating Bilateral DVT-   Right thigh veins: The common femoral, femoral, popliteal, upper greater saphenous, and deep femoral veins are patent and free of thrombus. The veins are normally compressible and have normal phasic flow and augmentation response.     Right calf veins: There is partial thrombosis of the right popliteal vein and thrombosis of the right peroneal vein.  The right posterior tibial vein is patent..     Left thigh veins: The common femoral is patent.  There is thrombosis of the mid and distal left femoral vein..     Left calf veins: There is thrombosis of the popliteal vein and peroneal vein.  The posterior anterior tibial veins are patent.    Possibly due to COVID 19. Reports h/o had h/o "bad blood flow to his leg"  Sedentary lifestyle in general but he does work and walk  Start IV heparin   Plan for 48 hrs of IV heparin then convert to X A inhibitor    "

## 2022-02-07 NOTE — ASSESSMENT & PLAN NOTE
Continue O2 treatment-keep O2 sats above 90%; o2 sat 97-99% on RA   Remdesivir - does not meet criteria   Dexamethasone day 1   lovenox- full dose   Azithromycin, Rocephin; procal not done; CXR negative for infiltrate, WBC normal, tmax 99.3,no O2 requirement    Vit C, Vit D, multivitamin   Pepcid 20 mg daily  - COVID-19 testing   - Infection Control notified     - Isolation:   - Airborne, Contact and Droplet Precautions  - Cohort patients into COVID units              - Limit visitors per hospital policy              - Consolidating lab draws, nursing care, provider visits, and interventions

## 2022-02-07 NOTE — NURSING
Patient admitted with SOB and generalized weakness. Bedside report received from SALOME Hawkins. Patient able to ambulate from stretcher to bed, free from falls/injury. Some SOB on exertion. Sat 99% on RA. All questions and concerns addressed appropriately. Plan of care reviewed and agreed upon.

## 2022-02-07 NOTE — ASSESSMENT & PLAN NOTE
Placing him at increased risk with COVID diagnosis. Thus far doing well and treating for DVT that is possibly COVId induced

## 2022-02-07 NOTE — HPI
41 y.o. morbidly obese male with PMHx of HTN and recent diagnosis of COPD who presented to ed yesterday with SOB x 3 days and chest tightness x 1 day. 3 days prior he tested positive covid. Since then, he has had worsening shortness of breath. Yesterday, he began having chest tightness which worsened.  His SOB worsens with exertion but his chest tightness has no association with exertion. He has been taking albuterol nebulizer treatments, albuterol inhaler and advair with no relief. His chest tightness is substernal, non-radiating, 7/10 in severity and constant. Endorses myalgias and intermittent palpitations. Denies fever, chills, lightheadedness, nausea, vomiting, diaphoresis or leg swelling.    CXR negative for infiltrate, WBC normal, tmax 99.3,no O2 requirment , procal not done   Subsequent LE US + Bilateral deep vein thrombosis.  Unable to perform CTA due to body habitus

## 2022-02-07 NOTE — ASSESSMENT & PLAN NOTE
States 5-6 cigars per day   Quit 1 week ago when Dr. Rabago told him he had COPD  Denies cravings

## 2022-02-07 NOTE — ASSESSMENT & PLAN NOTE
Takes muscle relaxer; baclofen, gabapentin 600mg tid  and hydrocodone at home for this   Continue baclofen, gabapentin, hydrocodone prn

## 2022-02-07 NOTE — H&P
[facility] ([address] [place] [address] Medicine  History & Physical    Patient Name: Marcello Fernandez  MRN: [id number]  Patient Class: OP- Observation  Admission Date: 2/6/2022  Attending Physician: Caro Heart MD   Primary Care Provider: Aden Rabago MD         Patient information was obtained from patient and ER records.     Subjective:     Principal Problem:Acute deep vein thrombosis (DVT) of both lower extremities    Chief Complaint:   Chief Complaint   Patient presents with    Shortness of Breath     Pt c/c of SOB and chest tightness.  Pt dx with covid on Friday, and recently dx with COPD.   Denies N/V/D.          HPI: 41 y.o. morbidly obese male with PMHx of HTN and recent diagnosis of COPD who presented to ed yesterday with SOB x 3 days and chest tightness x 1 day. 3 days prior he tested positive covid. Since then, he has had worsening shortness of breath. Yesterday, he began having chest tightness which worsened.  His SOB worsens with exertion but his chest tightness has no association with exertion. He has been taking albuterol nebulizer treatments, albuterol inhaler and advair with no relief. His chest tightness is substernal, non-radiating, 7/10 in severity and constant. Endorses myalgias and intermittent palpitations. Denies fever, chills, lightheadedness, nausea, vomiting, diaphoresis or leg swelling.    CXR negative for infiltrate, WBC normal, tmax 99.3,no O2 requirment , procal not done   Subsequent LE US + Bilateral deep vein thrombosis.  Unable to perform CTA due to body habitus         Past Medical History:   Diagnosis Date    Hypertension        Past Surgical History:   Procedure Laterality Date    ANKLE SURGERY Left        Review of patient's allergies indicates:  No Known Allergies    No current facility-administered medications on file prior to encounter.     Current Outpatient Medications on File Prior to Encounter   Medication Sig    baclofen (LIORESAL) 20 MG tablet  Take 2 tablets (40 mg total) by mouth 2 (two) times daily.    diclofenac (VOLTAREN) 75 MG EC tablet Take 1 tablet (75 mg total) by mouth 2 (two) times daily as needed (Pain).    HYDROcodone-acetaminophen (NORCO)  mg per tablet Take 1 tablet by mouth.    HYDROcodone-acetaminophen (NORCO)  mg per tablet Take 1 tablet by mouth every 6 (six) hours as needed for Pain.    loratadine (CLARITIN) 10 mg tablet Take 10 mg by mouth once daily.    tamsulosin (FLOMAX) 0.4 mg Cap Take 1 capsule by mouth once daily.    ibuprofen (ADVIL,MOTRIN) 600 MG tablet Take 1 tablet (600 mg total) by mouth every 6 (six) hours as needed for Pain.    ibuprofen (ADVIL,MOTRIN) 800 MG tablet TAKE 1 TABLET BY MOUTH THREE TIMES A DAY WITH FOOD OR MILK AS NEEDED    lisinopril (PRINIVIL,ZESTRIL) 40 MG tablet Take 1 tablet (40 mg total) by mouth once daily.    omeprazole (PRILOSEC) 20 MG capsule Take 2 capsules (40 mg total) by mouth once daily.    tiZANidine (ZANAFLEX) 4 MG tablet Take 4 mg by mouth every 6 (six) hours as needed.     Family History     Problem Relation (Age of Onset)    Diabetes Mellitus Mother, Father    Hypertension Father    Stroke Paternal Grandfather        Tobacco Use    Smoking status: Current Every Day Smoker     Years: 19.00     Types: Cigars     Start date: 3/4/1996    Smokeless tobacco: Never Used    Tobacco comment: pt stated smokes 5 cigars/day   Substance and Sexual Activity    Alcohol use: Yes     Alcohol/week: 0.0 standard drinks     Comment: occasional    Drug use: No    Sexual activity: Yes     Partners: Female     Birth control/protection: None     Review of Systems   Constitutional: Negative for activity change, fatigue, fever and unexpected weight change.   HENT: Negative for congestion, ear pain, hearing loss, rhinorrhea and sore throat.    Eyes: Negative for redness and visual disturbance.   Respiratory: Positive for shortness of breath. Negative for cough and wheezing.     Cardiovascular: Positive for leg swelling. Negative for chest pain and palpitations.   Gastrointestinal: Negative for abdominal pain, constipation, diarrhea, nausea and vomiting.   Genitourinary: Negative for decreased urine volume, dysuria, frequency and urgency.   Musculoskeletal: Negative for back pain, joint swelling and neck pain.   Skin: Negative for color change, rash and wound.   Neurological: Negative for dizziness, tremors, weakness, light-headedness and headaches.     Objective:     Vital Signs (Most Recent):  Temp: 97.4 °F (36.3 °C) (02/07/22 0731)  Pulse: 110 (02/07/22 0801)  Resp: (!) 22 (02/07/22 0731)  BP: 136/80 (02/07/22 0731)  SpO2: 97 % (02/07/22 0742) Vital Signs (24h Range):  Temp:  [97.1 °F (36.2 °C)-99.3 °F (37.4 °C)] 97.4 °F (36.3 °C)  Pulse:  [] 110  Resp:  [20-28] 22  SpO2:  [95 %-100 %] 97 %  BP: (136-187)/() 136/80     Weight: (!) 227.7 kg (501 lb 15.8 oz)  Body mass index is 74.13 kg/m².    Physical Exam  Vitals and nursing note reviewed.   Constitutional:       General: He is not in acute distress.     Appearance: He is well-developed and well-nourished. He is obese.   HENT:      Head: Normocephalic and atraumatic.      Right Ear: External ear normal.      Left Ear: External ear normal.   Eyes:      General:         Right eye: No discharge.         Left eye: No discharge.      Extraocular Movements: EOM normal.   Neck:      Thyroid: No thyromegaly.   Cardiovascular:      Rate and Rhythm: Normal rate and regular rhythm.      Heart sounds: No murmur heard.      Pulmonary:      Effort: Pulmonary effort is normal. No respiratory distress.      Breath sounds: Normal breath sounds. No wheezing or rales.   Abdominal:      General: Bowel sounds are normal. There is no distension.      Palpations: Abdomen is soft.      Tenderness: There is no abdominal tenderness.   Musculoskeletal:      Cervical back: Neck supple.      Right lower leg: Edema present.      Left lower leg: Edema  present.   Lymphadenopathy:      Cervical: No cervical adenopathy.   Skin:     General: Skin is warm and dry.   Neurological:      Mental Status: He is alert and oriented to person, place, and time.      Cranial Nerves: No cranial nerve deficit.   Psychiatric:         Mood and Affect: Mood and affect normal.         Behavior: Behavior normal.         Thought Content: Thought content normal.           CRANIAL NERVES     CN III, IV, VI   Extraocular motions are normal.        Significant Labs:   All pertinent labs within the past 24 hours have been reviewed.  A1C: No results for input(s): HGBA1C in the last 4320 hours.  ABGs: No results for input(s): PH, PCO2, HCO3, POCSATURATED, BE, TOTALHB, COHB, METHB, O2HB, POCFIO2, PO2 in the last 48 hours.  Bilirubin:   Recent Labs   Lab 02/06/22 2300   BILITOT 0.4     Blood Culture: No results for input(s): LABBLOO in the last 48 hours.  CBC:   Recent Labs   Lab 02/06/22 2300 02/07/22  0532   WBC 9.45 9.66   HGB 14.4 13.8*   HCT 45.2 43.3    244     CMP:   Recent Labs   Lab 02/06/22 2300 02/07/22  0532    138   K 4.2 4.5    103   CO2 25 24   * 177*   BUN 11 11   CREATININE 1.0 1.1   CALCIUM 9.4 9.6   PROT 7.3  --    ALBUMIN 3.6  --    BILITOT 0.4  --    ALKPHOS 92  --    AST 21  --    ALT 47*  --    ANIONGAP 9 11   EGFRNONAA >60 >60     Cardiac Markers:   Recent Labs   Lab 02/06/22 2300   BNP 29     Lactic Acid: No results for input(s): LACTATE in the last 48 hours.  Magnesium: No results for input(s): MG in the last 48 hours.  Troponin:   Recent Labs   Lab 02/06/22 2300   TROPONINI 0.007     TSH: No results for input(s): TSH in the last 4320 hours.  Urine Culture: No results for input(s): LABURIN in the last 48 hours.  Urine Studies: No results for input(s): COLORU, APPEARANCEUA, PHUR, SPECGRAV, PROTEINUA, GLUCUA, KETONESU, BILIRUBINUA, OCCULTUA, NITRITE, UROBILINOGEN, LEUKOCYTESUR, RBCUA, WBCUA, BACTERIA, SQUAMEPITHEL, HYALINECASTS in the last  "48 hours.    Invalid input(s): SHALOM    Significant Imaging: I have reviewed and interpreted all pertinent imaging results/findings within the past 24 hours.     CXR- Limited examination.  No definite focal consolidation    LE US-   Bilateral DVT    Assessment/Plan:     * Acute deep vein thrombosis (DVT) of both lower extremities  US demonstrating Bilateral DVT-   Right thigh veins: The common femoral, femoral, popliteal, upper greater saphenous, and deep femoral veins are patent and free of thrombus. The veins are normally compressible and have normal phasic flow and augmentation response.     Right calf veins: There is partial thrombosis of the right popliteal vein and thrombosis of the right peroneal vein.  The right posterior tibial vein is patent..     Left thigh veins: The common femoral is patent.  There is thrombosis of the mid and distal left femoral vein..     Left calf veins: There is thrombosis of the popliteal vein and peroneal vein.  The posterior anterior tibial veins are patent.    Possibly due to COVID 19. Reports h/o had h/o "bad blood flow to his leg"  Sedentary lifestyle in general but he does work and walk  Start IV heparin   Plan for 48 hrs of IV heparin then convert to X A inhibitor      COVID-19  Continue O2 treatment-keep O2 sats above 90%; o2 sat 97-99% on RA   Remdesivir - does not meet criteria   Dexamethasone day 1   lovenox- full dose   Azithromycin, Rocephin; procal not done; CXR negative for infiltrate, WBC normal, tmax 99.3,no O2 requirement    Vit C, Vit D, multivitamin   Pepcid 20 mg daily  - COVID-19 testing   - Infection Control notified     - Isolation:   - Airborne, Contact and Droplet Precautions  - Cohort patients into COVID units              - Limit visitors per hospital policy              - Consolidating lab draws, nursing care, provider visits, and interventions            Sciatica of right side  Takes muscle relaxer; baclofen, gabapentin 600mg tid  and hydrocodone " at home for this   Continue baclofen, gabapentin, hydrocodone prn         Tobacco dependence syndrome  States 5-6 cigars per day   Quit 1 week ago when Dr. Rabago told him he had COPD  Denies cravings     HTN (hypertension)  Takes lisinopril 40mg daily- continue   BP elevated intially - now 136/80        Morbid obesity with BMI of 50.0-59.9, adult  Placing him at increased risk with COVID diagnosis. Thus far doing well and treating for DVT that is possibly COVId induced        VTE Risk Mitigation (From admission, onward)         Ordered     heparin 25,000 units in dextrose 5% (100 units/ml) IV bolus from bag - ADDITIONAL PRN BOLUS - 60 units/kg  As needed (PRN)        Question:  Heparin Infusion Adjustment (DO NOT MODIFY ANSWER)  Answer:  \\[email or web address]\[email or web address]\Images\Pharmacy\HeparinInfusions\heparin HIGH INTENSITY nomogram for [facility] [id number]    02/07/22 0936     heparin 25,000 units in dextrose 5% (100 units/ml) IV bolus from bag - ADDITIONAL PRN BOLUS - 30 units/kg  As needed (PRN)        Question:  Heparin Infusion Adjustment (DO NOT MODIFY ANSWER)  Answer:  [email or web address]\[email or web address]\Images\Pharmacy\HeparinInfusions\heparin HIGH INTENSITY nomogram for [facility] [id number]    02/07/22 0936     heparin 25,000 units in dextrose 5% 250 mL (100 units/mL) infusion HIGH INTENSITY nomogram - [facility]  Continuous        Question Answer Comment   Heparin Infusion Adjustment (DO NOT MODIFY ANSWER) [email or web address]\epic\Images\Pharmacy\HeparinInfusions\heparin HIGH INTENSITY nomogram for [facility] [id number]    Begin at (in units/kg/hr) 18        02/07/22 0936     IP VTE HIGH RISK PATIENT  Once         02/07/22 0439     Place sequential compression device  Until discontinued         02/07/22 0439                   Gisselle Patricia MD  Department of Hospital Medicine   [place] [facility] ([facility])

## 2022-02-07 NOTE — ED NOTES
PT TRANSPORTED TO 3RD FLOOR VIA ED STRETCHER. NO ACUTE DISTRESS NOTED. BREATHS EVEN AND UNLABORED. REPORT GIVEN TO SALOME LEONARD

## 2022-02-07 NOTE — ED PROVIDER NOTES
[facility]                                                  Chief Complaint     Chief Complaint   Patient presents with    Shortness of Breath     Pt c/c of SOB and chest tightness.  Pt dx with covid on Friday, and recently dx with COPD.   Denies N/V/D.         History of Present Illness  41 y.o. male with PMHx of HTN and recent diagnosis of COPD who presents with SOB x 3 days and chest tightness x 1 day. 3 days ago he tested positive covid. Since then, he has had worsening shortness of breath. Yesterday, he began having chest tightness which worsened today. His SOB worsens with exertion but his chest tightness has no association with exertion. He has been taking albuterol nebulizer treatments, albuterol inhaler and advair with no relief. His chest tightness is substernal, non-radiating, 7/10 in severity and constant. Endorses myalgias and intermittent palpitations. Denies fever, chills, lightheadedness, nausea, vomiting, diaphoresis or leg swelling.       History obtained from:  Patient    Review of patient's allergies indicates:  No Known Allergies  Past Medical History:   Diagnosis Date    Hypertension      Past Surgical History:   Procedure Laterality Date    ANKLE SURGERY Left       Family History   Problem Relation Age of Onset    Diabetes Mellitus Mother     Diabetes Mellitus Father     Hypertension Father     Stroke Paternal Grandfather      Social History     Tobacco Use    Smoking status: Current Every Day Smoker     Years: 19.00     Types: Cigars     Start date: 3/4/1996    Smokeless tobacco: Never Used    Tobacco comment: pt stated smokes 5 cigars/day   Substance Use Topics    Alcohol use: Yes     Alcohol/week: 0.0 standard drinks     Comment: occasional    Drug use: No          Review of Systems and Physical Exam     Review of Systems      + Shortness of breath, chest tightness, myalgias, palpitations    All other symptoms negative as stated below    --Constitutional - Denies  fever, appetite change, chills  --Eyes - Denies eye discharge, eye pain, eye redness or visual disturbance  --HENT- Denies congestion, sore throat, drooling, ear discharge, rhinorrhea or trouble swallowing  --Respiratory - Denies cough, wheezing  --Cardiovascular - Denies leg swelling  --Gastrointestinal - Denies abdominal pain, abdominal distension, constipation, diarrhea, nausea or vomiting  --Genitourinary - Denies difficulty urinating, dysuria, hematuria, urgency  --Musculoskeletal - Denies arthralgias  --Neurological - Denies dizziness, headaches, lightheadedness, weakness  --Hematologic - Denies easy bruising or easy bleeding  --Skin - Denies rash, wound or pallor  --Psychiatric- Denies dysphoric mood, nervousness/anxiety    Vital Signs   weight is 227.7 kg (501 lb 15.8 oz) (abnormal). His temperature is 99.3 °F (37.4 °C). His blood pressure is 155/113 (abnormal) and his pulse is 100. His respiration is 28 (abnormal) and oxygen saturation is 97%.      Physical Exam   Nursing note and vitals reviewed  --Constitutional:  Well developed, well nourished  --HENT: Normocephalic, atraumatic. No rhinorrhea. Moist oral mucosa. No oropharyngeal edema, erythema or exudates.   --Eyes: PERRL. Extraocular movements intact. No periorbital swelling. Normal conjunctiva.  --Neck: Normal range of motion. Neck supple. No adenopathy  --Cardiac: Regular rhythm, normal S1, normal S2, no murmur, normal rate, intact distal pulses  --Pulmonary: Increased respiratory effort, breath sounds normal and equal bilaterally, no accessory muscle use, no respiratory distress  --Abdominal: Soft, normal bowel sounds, no tenderness, no guarding, no rebound  --Musculoskeletal: Trace BLE edema. Normal range of motion. No deformity, tenderness.  --Neurological:  Alert and oriented x 4. Follows commands appropriately.    --Skin: Warm and dry. No rash, pallor, cyanosis or jaundice.  --Psych: Normal mood    ED Course   Critical Care    Date/Time:  2/7/2022 3:10 AM  Performed by: Uche Argueta MD  Authorized by: Uche Argueta MD   Direct patient critical care time: 8 minutes  Additional history critical care time: 8 minutes  Ordering / reviewing critical care time: 8 minutes  Documentation critical care time: 8 minutes  Consulting other physicians critical care time: 7 minutes  Total critical care time (exclusive of procedural time) : 39 minutes  Critical care time was exclusive of separately billable procedures and treating other patients.  Critical care was necessary to treat or prevent imminent or life-threatening deterioration of the following conditions: respiratory failure.  Critical care was time spent personally by me on the following activities: blood draw for specimens, development of treatment plan with patient or surrogate, discussions with consultants, evaluation of patient's response to treatment, review of old charts, re-evaluation of patient's condition, pulse oximetry, ordering and review of radiographic studies, ordering and review of laboratory studies, ordering and performing treatments and interventions, obtaining history from patient or surrogate and examination of patient.        Lab Results (reviewed by the physician)  Labs Reviewed   D DIMER, QUANTITATIVE - Abnormal; Notable for the following components:       Result Value    D-Dimer 2.35 (*)     All other components within normal limits   COMPREHENSIVE METABOLIC PANEL - Abnormal; Notable for the following components:    Glucose 137 (*)     ALT 47 (*)     All other components within normal limits   CBC W/ AUTO DIFFERENTIAL - Abnormal; Notable for the following components:    MCHC 31.9 (*)     All other components within normal limits   INFLUENZA A & B BY MOLECULAR   SARS-COV-2 RNA AMPLIFICATION, QUAL   B-TYPE NATRIURETIC PEPTIDE   TROPONIN I       EKG (interpreted by the physician)  Rate: 111 bpm  Rhythm: Sinus Tachycardia  Axis: Normal  QTc interval: 462 ms  ST-segments: No  elevation or depression  Overall Interpretation: Sinus tachycardia with no evidence of ischemia or infarction    Radiology (images visualized & reports reviewed by the physician)  X-Ray Chest 1 View   Final Result      Limited examination.  No definite focal consolidation.         Electronically signed by: Demetria Lal   Date:    02/06/2022   Time:    23:40      US Lower Extremity Veins Bilateral    (Results Pending)       Medications Given  Medications   albuterol inhaler 8 puff (has no administration in time range)   enoxaparin injection 230 mg (has no administration in time range)   methylPREDNISolone sodium succinate injection 125 mg (125 mg Intravenous Given 2/6/22 2306)   lisinopriL tablet 40 mg (40 mg Oral Given 2/7/22 0227)       Differential Diagnosis:  Asthma, COPD, Covid, Influenza, cardiogenic pulmonary edema, noncardiogenic pulmonary edema, pneumonia, myocardial infarction, cardiac tamponade    Clinical Tests:  Lab Tests: Ordered and reviewed  Radiological Study: Ordered and reviewed  Medical Tests: Ordered and reviewed    ED Management    His temperature is 99.3 °F (37.4 °C). His blood pressure is 187/90 (abnormal) and his pulse is 110. His respiration is 28 (abnormal).     Physical exam revealed increased respiratory effort with clear breath sounds, no accessory muscle use inpatient speaking in full 8-10 word sentences.  Labs revealed elevated D-dimer.  Chest x-ray revealed no evidence of volume overload or pneumonia.  EKG revealed sinus tachycardia with no evidence of ischemia or infarction. Considering body habitus, CT PE study was unable to be obtained for further evaluation of elevated D-dimer.  As a result, bilateral lower extremity DVT study was obtained and revealed bilateral lower extremity DVTs.  Lovenox given and patient was admitted to hospital medicine for further care.      Diagnosis  The primary encounter diagnosis was Acute deep vein thrombosis (DVT) of both peroneal veins. Diagnoses  of Shortness of breath, SOB (shortness of breath), and Elevated d-dimer were also pertinent to this visit.    Disposition and Plan  Condition: Stable  Disposition: Admit to hospital medicine              Uche Argueta MD  02/07/22 0317

## 2022-02-07 NOTE — PLAN OF CARE
Problem: Adult Inpatient Plan of Care  Goal: Plan of Care Review  Outcome: Ongoing, Progressing  Goal: Patient-Specific Goal (Individualized)  Outcome: Ongoing, Progressing  Goal: Absence of Hospital-Acquired Illness or Injury  Outcome: Ongoing, Progressing  Goal: Optimal Comfort and Wellbeing  Outcome: Ongoing, Progressing  Goal: Readiness for Transition of Care  Outcome: Ongoing, Progressing     Problem: Bariatric Environmental Safety  Goal: Safety Maintained with Care  Outcome: Ongoing, Progressing     Problem: Skin Injury Risk Increased  Goal: Skin Health and Integrity  Outcome: Ongoing, Progressing     Problem: VTE (Venous Thromboembolism)  Goal: VTE (Venous Thromboembolism) Symptom Resolution  Outcome: Ongoing, Progressing     Problem: Gas Exchange Impaired  Goal: Optimal Gas Exchange  Outcome: Ongoing, Progressing     Problem: Tissue Perfusion Altered  Goal: Improved Tissue Perfusion  Outcome: Ongoing, Progressing     Problem: COPD (Chronic Obstructive Pulmonary Disease) Comorbidity  Goal: Maintenance of COPD Symptom Control  Outcome: Ongoing, Progressing     Problem: Hypertension Comorbidity  Goal: Blood Pressure in Desired Range  Outcome: Ongoing, Progressing     Problem: Obstructive Sleep Apnea Risk or Actual Comorbidity Management  Goal: Unobstructed Breathing During Sleep  Outcome: Ongoing, Progressing     Problem: Pain Chronic (Persistent) (Comorbidity Management)  Goal: Acceptable Pain Control and Functional Ability  Outcome: Ongoing, Progressing     Started heparin continuous infusion. Ambulates independently. Free from falls and injury. Afebrile.

## 2022-02-07 NOTE — SUBJECTIVE & OBJECTIVE
Past Medical History:   Diagnosis Date    Hypertension        Past Surgical History:   Procedure Laterality Date    ANKLE SURGERY Left        Review of patient's allergies indicates:  No Known Allergies    No current facility-administered medications on file prior to encounter.     Current Outpatient Medications on File Prior to Encounter   Medication Sig    baclofen (LIORESAL) 20 MG tablet Take 2 tablets (40 mg total) by mouth 2 (two) times daily.    diclofenac (VOLTAREN) 75 MG EC tablet Take 1 tablet (75 mg total) by mouth 2 (two) times daily as needed (Pain).    HYDROcodone-acetaminophen (NORCO)  mg per tablet Take 1 tablet by mouth.    HYDROcodone-acetaminophen (NORCO)  mg per tablet Take 1 tablet by mouth every 6 (six) hours as needed for Pain.    loratadine (CLARITIN) 10 mg tablet Take 10 mg by mouth once daily.    tamsulosin (FLOMAX) 0.4 mg Cap Take 1 capsule by mouth once daily.    ibuprofen (ADVIL,MOTRIN) 600 MG tablet Take 1 tablet (600 mg total) by mouth every 6 (six) hours as needed for Pain.    ibuprofen (ADVIL,MOTRIN) 800 MG tablet TAKE 1 TABLET BY MOUTH THREE TIMES A DAY WITH FOOD OR MILK AS NEEDED    lisinopril (PRINIVIL,ZESTRIL) 40 MG tablet Take 1 tablet (40 mg total) by mouth once daily.    omeprazole (PRILOSEC) 20 MG capsule Take 2 capsules (40 mg total) by mouth once daily.    tiZANidine (ZANAFLEX) 4 MG tablet Take 4 mg by mouth every 6 (six) hours as needed.     Family History     Problem Relation (Age of Onset)    Diabetes Mellitus Mother, Father    Hypertension Father    Stroke Paternal Grandfather        Tobacco Use    Smoking status: Current Every Day Smoker     Years: 19.00     Types: Cigars     Start date: 3/4/1996    Smokeless tobacco: Never Used    Tobacco comment: pt stated smokes 5 cigars/day   Substance and Sexual Activity    Alcohol use: Yes     Alcohol/week: 0.0 standard drinks     Comment: occasional    Drug use: No    Sexual activity: Yes      Partners: Female     Birth control/protection: None     Review of Systems   Constitutional: Negative for activity change, fatigue, fever and unexpected weight change.   HENT: Negative for congestion, ear pain, hearing loss, rhinorrhea and sore throat.    Eyes: Negative for redness and visual disturbance.   Respiratory: Positive for shortness of breath. Negative for cough and wheezing.    Cardiovascular: Positive for leg swelling. Negative for chest pain and palpitations.   Gastrointestinal: Negative for abdominal pain, constipation, diarrhea, nausea and vomiting.   Genitourinary: Negative for decreased urine volume, dysuria, frequency and urgency.   Musculoskeletal: Negative for back pain, joint swelling and neck pain.   Skin: Negative for color change, rash and wound.   Neurological: Negative for dizziness, tremors, weakness, light-headedness and headaches.     Objective:     Vital Signs (Most Recent):  Temp: 97.4 °F (36.3 °C) (02/07/22 0731)  Pulse: 110 (02/07/22 0801)  Resp: (!) 22 (02/07/22 0731)  BP: 136/80 (02/07/22 0731)  SpO2: 97 % (02/07/22 0742) Vital Signs (24h Range):  Temp:  [97.1 °F (36.2 °C)-99.3 °F (37.4 °C)] 97.4 °F (36.3 °C)  Pulse:  [] 110  Resp:  [20-28] 22  SpO2:  [95 %-100 %] 97 %  BP: (136-187)/() 136/80     Weight: (!) 227.7 kg (501 lb 15.8 oz)  Body mass index is 74.13 kg/m².    Physical Exam  Vitals and nursing note reviewed.   Constitutional:       General: He is not in acute distress.     Appearance: He is well-developed and well-nourished. He is obese.   HENT:      Head: Normocephalic and atraumatic.      Right Ear: External ear normal.      Left Ear: External ear normal.   Eyes:      General:         Right eye: No discharge.         Left eye: No discharge.      Extraocular Movements: EOM normal.   Neck:      Thyroid: No thyromegaly.   Cardiovascular:      Rate and Rhythm: Normal rate and regular rhythm.      Heart sounds: No murmur heard.      Pulmonary:      Effort:  Pulmonary effort is normal. No respiratory distress.      Breath sounds: Normal breath sounds. No wheezing or rales.   Abdominal:      General: Bowel sounds are normal. There is no distension.      Palpations: Abdomen is soft.      Tenderness: There is no abdominal tenderness.   Musculoskeletal:      Cervical back: Neck supple.      Right lower leg: Edema present.      Left lower leg: Edema present.   Lymphadenopathy:      Cervical: No cervical adenopathy.   Skin:     General: Skin is warm and dry.   Neurological:      Mental Status: He is alert and oriented to person, place, and time.      Cranial Nerves: No cranial nerve deficit.   Psychiatric:         Mood and Affect: Mood and affect normal.         Behavior: Behavior normal.         Thought Content: Thought content normal.           CRANIAL NERVES     CN III, IV, VI   Extraocular motions are normal.        Significant Labs:   All pertinent labs within the past 24 hours have been reviewed.  A1C: No results for input(s): HGBA1C in the last 4320 hours.  ABGs: No results for input(s): PH, PCO2, HCO3, POCSATURATED, BE, TOTALHB, COHB, METHB, O2HB, POCFIO2, PO2 in the last 48 hours.  Bilirubin:   Recent Labs   Lab 02/06/22 2300   BILITOT 0.4     Blood Culture: No results for input(s): LABBLOO in the last 48 hours.  CBC:   Recent Labs   Lab 02/06/22 2300 02/07/22  0532   WBC 9.45 9.66   HGB 14.4 13.8*   HCT 45.2 43.3    244     CMP:   Recent Labs   Lab 02/06/22 2300 02/07/22  0532    138   K 4.2 4.5    103   CO2 25 24   * 177*   BUN 11 11   CREATININE 1.0 1.1   CALCIUM 9.4 9.6   PROT 7.3  --    ALBUMIN 3.6  --    BILITOT 0.4  --    ALKPHOS 92  --    AST 21  --    ALT 47*  --    ANIONGAP 9 11   EGFRNONAA >60 >60     Cardiac Markers:   Recent Labs   Lab 02/06/22 2300   BNP 29     Lactic Acid: No results for input(s): LACTATE in the last 48 hours.  Magnesium: No results for input(s): MG in the last 48 hours.  Troponin:   Recent Labs   Lab  02/06/22  2300   TROPONINI 0.007     TSH: No results for input(s): TSH in the last 4320 hours.  Urine Culture: No results for input(s): LABURIN in the last 48 hours.  Urine Studies: No results for input(s): COLORU, APPEARANCEUA, PHUR, SPECGRAV, PROTEINUA, GLUCUA, KETONESU, BILIRUBINUA, OCCULTUA, NITRITE, UROBILINOGEN, LEUKOCYTESUR, RBCUA, WBCUA, BACTERIA, SQUAMEPITHEL, HYALINECASTS in the last 48 hours.    Invalid input(s): WRIGHTSUR    Significant Imaging: I have reviewed and interpreted all pertinent imaging results/findings within the past 24 hours.     CXR- Limited examination.  No definite focal consolidation    LE US-   Bilateral DVT

## 2022-02-08 LAB
APTT BLDCRRT: 51.3 SEC (ref 21–32)
APTT BLDCRRT: 51.9 SEC (ref 21–32)
APTT BLDCRRT: 58.8 SEC (ref 21–32)
APTT BLDCRRT: 65.8 SEC (ref 21–32)
APTT BLDCRRT: <21 SEC (ref 21–32)
BASOPHILS # BLD AUTO: 0.03 K/UL (ref 0–0.2)
BASOPHILS NFR BLD: 0.2 % (ref 0–1.9)
DIFFERENTIAL METHOD: ABNORMAL
EOSINOPHIL # BLD AUTO: 0 K/UL (ref 0–0.5)
EOSINOPHIL NFR BLD: 0.1 % (ref 0–8)
ERYTHROCYTE [DISTWIDTH] IN BLOOD BY AUTOMATED COUNT: 13.6 % (ref 11.5–14.5)
HCT VFR BLD AUTO: 44.1 % (ref 40–54)
HGB BLD-MCNC: 13.8 G/DL (ref 14–18)
IMM GRANULOCYTES # BLD AUTO: 0.1 K/UL (ref 0–0.04)
IMM GRANULOCYTES NFR BLD AUTO: 0.6 % (ref 0–0.5)
LYMPHOCYTES # BLD AUTO: 2.3 K/UL (ref 1–4.8)
LYMPHOCYTES NFR BLD: 14.6 % (ref 18–48)
MCH RBC QN AUTO: 27.8 PG (ref 27–31)
MCHC RBC AUTO-ENTMCNC: 31.3 G/DL (ref 32–36)
MCV RBC AUTO: 89 FL (ref 82–98)
MONOCYTES # BLD AUTO: 1.3 K/UL (ref 0.3–1)
MONOCYTES NFR BLD: 8.3 % (ref 4–15)
NEUTROPHILS # BLD AUTO: 11.8 K/UL (ref 1.8–7.7)
NEUTROPHILS NFR BLD: 76.2 % (ref 38–73)
NRBC BLD-RTO: 0 /100 WBC
PLATELET # BLD AUTO: 273 K/UL (ref 150–450)
PMV BLD AUTO: 11 FL (ref 9.2–12.9)
POCT GLUCOSE: 123 MG/DL (ref 70–110)
RBC # BLD AUTO: 4.96 M/UL (ref 4.6–6.2)
WBC # BLD AUTO: 15.54 K/UL (ref 3.9–12.7)

## 2022-02-08 PROCEDURE — 99233 SBSQ HOSP IP/OBS HIGH 50: CPT | Mod: ,,, | Performed by: INTERNAL MEDICINE

## 2022-02-08 PROCEDURE — 63600175 PHARM REV CODE 636 W HCPCS: Performed by: NURSE PRACTITIONER

## 2022-02-08 PROCEDURE — 85025 COMPLETE CBC W/AUTO DIFF WBC: CPT | Performed by: NURSE PRACTITIONER

## 2022-02-08 PROCEDURE — 25000003 PHARM REV CODE 250: Performed by: STUDENT IN AN ORGANIZED HEALTH CARE EDUCATION/TRAINING PROGRAM

## 2022-02-08 PROCEDURE — 27000207 HC ISOLATION

## 2022-02-08 PROCEDURE — 99233 PR SUBSEQUENT HOSPITAL CARE,LEVL III: ICD-10-PCS | Mod: ,,, | Performed by: INTERNAL MEDICINE

## 2022-02-08 PROCEDURE — 85730 THROMBOPLASTIN TIME PARTIAL: CPT | Mod: 91 | Performed by: NURSE PRACTITIONER

## 2022-02-08 PROCEDURE — 36415 COLL VENOUS BLD VENIPUNCTURE: CPT | Performed by: FAMILY MEDICINE

## 2022-02-08 PROCEDURE — 85730 THROMBOPLASTIN TIME PARTIAL: CPT | Performed by: FAMILY MEDICINE

## 2022-02-08 PROCEDURE — 25000003 PHARM REV CODE 250: Performed by: NURSE PRACTITIONER

## 2022-02-08 PROCEDURE — 85730 THROMBOPLASTIN TIME PARTIAL: CPT | Mod: 91 | Performed by: FAMILY MEDICINE

## 2022-02-08 PROCEDURE — 25000003 PHARM REV CODE 250: Performed by: INTERNAL MEDICINE

## 2022-02-08 PROCEDURE — 36415 COLL VENOUS BLD VENIPUNCTURE: CPT | Performed by: NURSE PRACTITIONER

## 2022-02-08 PROCEDURE — 11000001 HC ACUTE MED/SURG PRIVATE ROOM

## 2022-02-08 PROCEDURE — 94761 N-INVAS EAR/PLS OXIMETRY MLT: CPT

## 2022-02-08 RX ADMIN — GABAPENTIN 600 MG: 300 CAPSULE ORAL at 08:02

## 2022-02-08 RX ADMIN — OXYCODONE HYDROCHLORIDE AND ACETAMINOPHEN 500 MG: 500 TABLET ORAL at 08:02

## 2022-02-08 RX ADMIN — HYDROCODONE BITARTRATE AND ACETAMINOPHEN 1 TABLET: 10; 325 TABLET ORAL at 12:02

## 2022-02-08 RX ADMIN — BACLOFEN 40 MG: 10 TABLET ORAL at 08:02

## 2022-02-08 RX ADMIN — LISINOPRIL 40 MG: 40 TABLET ORAL at 08:02

## 2022-02-08 RX ADMIN — FAMOTIDINE 20 MG: 20 TABLET ORAL at 08:02

## 2022-02-08 RX ADMIN — HYDROCODONE BITARTRATE AND ACETAMINOPHEN 1 TABLET: 10; 325 TABLET ORAL at 08:02

## 2022-02-08 RX ADMIN — DEXAMETHASONE SODIUM PHOSPHATE 6 MG: 4 INJECTION, SOLUTION INTRA-ARTICULAR; INTRALESIONAL; INTRAMUSCULAR; INTRAVENOUS; SOFT TISSUE at 08:02

## 2022-02-08 RX ADMIN — GABAPENTIN 600 MG: 300 CAPSULE ORAL at 02:02

## 2022-02-08 RX ADMIN — MIRABEGRON 25 MG: 25 TABLET, FILM COATED, EXTENDED RELEASE ORAL at 12:02

## 2022-02-08 RX ADMIN — HEPARIN SODIUM 19 UNITS/KG/HR: 10000 INJECTION, SOLUTION INTRAVENOUS at 03:02

## 2022-02-08 RX ADMIN — THERA TABS 1 TABLET: TAB at 08:02

## 2022-02-08 NOTE — ASSESSMENT & PLAN NOTE
States 5-6 cigars per day   Quit 1 week ago when Dr. Rabago told him he had COPD  Denies cravings .

## 2022-02-08 NOTE — ASSESSMENT & PLAN NOTE
Continue O2 treatment-keep O2 sats above 90%; o2 sat 97-99% on RA   Remdesivir - does not meet criteria   Dexamethasone day 2  lovenox- IV heparin x 48 hrs   Azithromycin, Rocephin; procal not done; CXR negative for infiltrate, WBC normal, tmax 99.3,no O2 requirement    Vit C, Vit D, multivitamin   Pepcid 20 mg daily  - COVID-19 testing   - Infection Control notified .    - Isolation:   - Airborne, Contact and Droplet Precautions  - Cohort patients into COVID units              - Limit visitors per hospital policy              - Consolidating lab draws, nursing care, provider visits, and interventions

## 2022-02-08 NOTE — PLAN OF CARE
Problem: Adult Inpatient Plan of Care  Goal: Plan of Care Review  Outcome: Ongoing, Progressing  Goal: Patient-Specific Goal (Individualized)  Outcome: Ongoing, Progressing  Goal: Absence of Hospital-Acquired Illness or Injury  Outcome: Ongoing, Progressing  Goal: Optimal Comfort and Wellbeing  Outcome: Ongoing, Progressing  Goal: Readiness for Transition of Care  Outcome: Ongoing, Progressing     Problem: Bariatric Environmental Safety  Goal: Safety Maintained with Care  Outcome: Ongoing, Progressing     Problem: Skin Injury Risk Increased  Goal: Skin Health and Integrity  Outcome: Ongoing, Progressing     Problem: VTE (Venous Thromboembolism)  Goal: VTE (Venous Thromboembolism) Symptom Resolution  Outcome: Ongoing, Progressing     Problem: Gas Exchange Impaired  Goal: Optimal Gas Exchange  Outcome: Ongoing, Progressing     Problem: Tissue Perfusion Altered  Goal: Improved Tissue Perfusion  Outcome: Ongoing, Progressing     Problem: COPD (Chronic Obstructive Pulmonary Disease) Comorbidity  Goal: Maintenance of COPD Symptom Control  Outcome: Ongoing, Progressing     Problem: Hypertension Comorbidity  Goal: Blood Pressure in Desired Range  Outcome: Ongoing, Progressing     Problem: Obstructive Sleep Apnea Risk or Actual Comorbidity Management  Goal: Unobstructed Breathing During Sleep  Outcome: Ongoing, Progressing     Problem: Pain Chronic (Persistent) (Comorbidity Management)  Goal: Acceptable Pain Control and Functional Ability  Outcome: Ongoing, Progressing     Heparin infusion continues to infuse. Ambulates independently. Free from falls and injury. Afebrile.

## 2022-02-08 NOTE — PLAN OF CARE
Plan of Care:  Monitor vital signs, telemetry, labs, SATs.  Heparin drip dosing/aPTT labs per high intensity heparin infusion nomogram.  Normal sinus/tachycardia on monitor.  Afebrile.  Encouraged water intake with oral medications.  No complaint of pain/discomfort chest/bilateral lower extremities.  Covid precautions in place.  No falls this shift.

## 2022-02-08 NOTE — ASSESSMENT & PLAN NOTE
Takes muscle relaxer; baclofen, gabapentin 600mg tid  and hydrocodone at home for this   Continue baclofen, gabapentin, hydrocodone prn .

## 2022-02-08 NOTE — PROGRESS NOTES
Nursing Notes    Report received from Gisselle MCMAHON. Patient asleep.  Respirations deep even.  Call bell in reach. Covid isolation in place.        Huddle Comments

## 2022-02-08 NOTE — HOSPITAL COURSE
1/8 Day 2/2 of IV heparin for Bilateral LE DVT in setting of Covid and morbid obesity  Plan to transition to XA inhibitor in AM ;   /61- 152/105, HR 80-90s, Afebrile, O2 sat 95-97% on RA, Getting dexamethasone; WBC 9.66> 15.54, afebrile   Stop dexa as he has no infiltrates ; no documented hypoxia .  Discussed with patient and wife the need for 3-6 m of anticoagulation ; He sees Dr ELSY marquez .  We also discussed obesity and need to loose weight ; he seems very motivated to loose weight     2/9 40 YO M with recent COVID 19 admitted with SOB and chest tightness   Completed 2 full days of IV heparin for Bilateral LE DVT in setting of Covid and morbid obesity   will transition to Eliquis 10mg bid x 7 days; he has + Bilateral DVT; Unknown PE due to unable to perform CTA due to body habitus,   /86 HR 80-90s, Afebrile, O2 sat 95-97% on RA, Getting dexamethasone; WBC 9.66> 15.54, afebrile   Stop dexa as he has no infiltrates ; no documented hypoxia .  need for 3-6 m of anticoagulation ; He sees Dr ELSY marquez .  We also discussed obesity and need to loose weight ; he seems very motivated to loose weight

## 2022-02-08 NOTE — PLAN OF CARE
[facility] - Med Surg (3rd Fl)  Initial Discharge Assessment       Primary Care Provider: Aden Rabago MD    Admission Diagnosis: Shortness of breath [R06.02]  SOB (shortness of breath) [R06.02]  Elevated d-dimer [R79.89]  Acute deep vein thrombosis (DVT) of both peroneal veins [I82.453]    Admission Date: 2/6/2022  Expected Discharge Date:     Discharge Barriers Identified: None    Payor: MEDICAID / Plan: [facility] COMMUNITY PLAN [facility] ([place] MEDICAID) / Product Type: Managed Medicaid /     Extended Emergency Contact Information  Primary Emergency Contact: Alec Fernandez   [facility]  Mobile Phone: [phone]  Relation: Spouse  Secondary Emergency Contact: FernandezDorothea   [place]  Mobile Phone: [phone]  Relation: Mother    Discharge Plan A: Home  Discharge Plan B: Home with family      [id number] - [place] - [address]  [address]  [place] [address]  Phone: [phone] Fax: [phone]    [facility] - [place], [place] - [address]  [address]  [place] [address]  Phone: [phone] Fax: [phone]    [id number] - [place] - [address] AT [facility] [address]  [address]  [place] [address]  Phone: [phone] Fax: [phone]    [id number] - [place] - [address] AT [facility] & [facility]  [address]  [place] [address]  Phone: [phone] Fax: [phone]      Initial Assessment (most recent)     Adult Discharge Assessment - 02/08/22 1322        Discharge Assessment    Assessment Type Discharge Planning Assessment     Confirmed/corrected address, phone number and insurance Yes     Confirmed Demographics Correct on Facesheet     Source of Information patient     Communicated GREY with patient/caregiver Date not available/Unable to determine     Reason For Admission DVT     Lives With spouse;child(nickolas), dependent     Facility  Arrived From: Home     Do you expect to return to your current living situation? Yes     Do you have help at home or someone to help you manage your care at home? Yes     Who are your caregiver(s) and their phone number(s)? Alec Fernandez (Spouse) [phone]     Prior to hospitilization cognitive status: Alert/Oriented     Current cognitive status: Alert/Oriented     Walking or Climbing Stairs Difficulty none     Dressing/Bathing Difficulty none     Equipment Currently Used at Home nebulizer;CPAP     Readmission within 30 days? No     Patient currently being followed by outpatient case management? No     Do you currently have service(s) that help you manage your care at home? No     Do you take prescription medications? Yes     Do you have prescription coverage? Yes     Coverage [facility] Medicaid     Do you have any problems affording any of your prescribed medications? No     How do you get to doctors appointments? car, drives self     Are you on dialysis? No     Do you take coumadin? No     Discharge Plan A Home     Discharge Plan B Home with family     DME Needed Upon Discharge  none     Discharge Plan discussed with: Patient     Discharge Barriers Identified None                    Discharge assessment completed with patient over the phone to maintain COVID precautions. Denies any post-acute care needs at this time. SW to remain available.

## 2022-02-08 NOTE — SUBJECTIVE & OBJECTIVE
Past Medical History:   Diagnosis Date    Hypertension        Past Surgical History:   Procedure Laterality Date    ANKLE SURGERY Left        Review of patient's allergies indicates:  No Known Allergies    No current facility-administered medications on file prior to encounter.     Current Outpatient Medications on File Prior to Encounter   Medication Sig    baclofen (LIORESAL) 20 MG tablet Take 2 tablets (40 mg total) by mouth 2 (two) times daily.    diclofenac (VOLTAREN) 75 MG EC tablet Take 1 tablet (75 mg total) by mouth 2 (two) times daily as needed (Pain).    HYDROcodone-acetaminophen (NORCO)  mg per tablet Take 1 tablet by mouth.    HYDROcodone-acetaminophen (NORCO)  mg per tablet Take 1 tablet by mouth every 6 (six) hours as needed for Pain.    loratadine (CLARITIN) 10 mg tablet Take 10 mg by mouth once daily.    ibuprofen (ADVIL,MOTRIN) 600 MG tablet Take 1 tablet (600 mg total) by mouth every 6 (six) hours as needed for Pain.    ibuprofen (ADVIL,MOTRIN) 800 MG tablet TAKE 1 TABLET BY MOUTH THREE TIMES A DAY WITH FOOD OR MILK AS NEEDED    lisinopril (PRINIVIL,ZESTRIL) 40 MG tablet Take 1 tablet (40 mg total) by mouth once daily.    mirabegron (MYRBETRIQ) 25 mg Tb24 ER tablet Take 25 mg by mouth once daily.    omeprazole (PRILOSEC) 20 MG capsule Take 2 capsules (40 mg total) by mouth once daily.    tiZANidine (ZANAFLEX) 4 MG tablet Take 4 mg by mouth every 6 (six) hours as needed.     Family History     Problem Relation (Age of Onset)    Diabetes Mellitus Mother, Father    Hypertension Father    Stroke Paternal Grandfather        Tobacco Use    Smoking status: Current Every Day Smoker     Years: 19.00     Types: Cigars     Start date: 3/4/1996    Smokeless tobacco: Never Used    Tobacco comment: pt stated smokes 5 cigars/day   Substance and Sexual Activity    Alcohol use: Yes     Alcohol/week: 0.0 standard drinks     Comment: occasional    Drug use: No    Sexual activity: Yes      Partners: Female     Birth control/protection: None     Review of Systems   Constitutional: Negative for activity change, fatigue, fever and unexpected weight change.   HENT: Negative for congestion, ear pain, hearing loss, rhinorrhea and sore throat.    Eyes: Negative for redness and visual disturbance.   Respiratory: Negative for cough and wheezing.    Cardiovascular: Positive for leg swelling. Negative for chest pain and palpitations.   Gastrointestinal: Negative for abdominal pain, constipation, diarrhea, nausea and vomiting.   Genitourinary: Negative for decreased urine volume, dysuria, frequency and urgency.   Musculoskeletal: Negative for back pain, joint swelling and neck pain.   Skin: Negative for color change, rash and wound.   Neurological: Negative for dizziness, tremors, weakness, light-headedness and headaches.     Objective:     Vital Signs (Most Recent):  Temp: 96.3 °F (35.7 °C) (02/08/22 0715)  Pulse: 95 (02/08/22 0715)  Resp: 20 (02/08/22 0715)  BP: (!) 152/105 (02/08/22 0715)  SpO2: 97 % (02/08/22 0748) Vital Signs (24h Range):  Temp:  [96.3 °F (35.7 °C)-98.2 °F (36.8 °C)] 96.3 °F (35.7 °C)  Pulse:  [] 95  Resp:  [18-20] 20  SpO2:  [95 %-99 %] 97 %  BP: (128-168)/() 152/105     Weight: (!) 227.7 kg (501 lb 15.8 oz)  Body mass index is 74.13 kg/m².    Physical Exam  Vitals and nursing note reviewed.   Constitutional:       General: He is not in acute distress.     Appearance: He is well-developed. He is obese.      Comments: Morbidly obese BMI 74.  501 lbs    HENT:      Head: Normocephalic and atraumatic.      Right Ear: External ear normal.      Left Ear: External ear normal.   Eyes:      General:         Right eye: No discharge.         Left eye: No discharge.      Extraocular Movements: EOM normal.   Neck:      Thyroid: No thyromegaly.   Cardiovascular:      Rate and Rhythm: Normal rate and regular rhythm.      Heart sounds: No murmur heard.      Pulmonary:      Effort: Pulmonary  effort is normal. No respiratory distress.      Breath sounds: Normal breath sounds. No wheezing or rales.   Abdominal:      General: Bowel sounds are normal. There is no distension.      Palpations: Abdomen is soft.      Tenderness: There is no abdominal tenderness.   Musculoskeletal:      Cervical back: Neck supple.      Right lower leg: Edema present.      Left lower leg: Edema present.   Lymphadenopathy:      Cervical: No cervical adenopathy.   Skin:     General: Skin is warm and dry.   Neurological:      Mental Status: He is alert and oriented to person, place, and time.      Cranial Nerves: No cranial nerve deficit.   Psychiatric:         Behavior: Behavior normal.         Thought Content: Thought content normal.           CRANIAL NERVES     CN III, IV, VI   Extraocular motions are normal.        Significant Labs:   All pertinent labs within the past 24 hours have been reviewed.  A1C: No results for input(s): HGBA1C in the last 4320 hours.  ABGs: No results for input(s): PH, PCO2, HCO3, POCSATURATED, BE, TOTALHB, COHB, METHB, O2HB, POCFIO2, PO2 in the last 48 hours.  Bilirubin:   Recent Labs   Lab 02/06/22 2300   BILITOT 0.4     Blood Culture: No results for input(s): LABBLOO in the last 48 hours.  CBC:   Recent Labs   Lab 02/06/22 2300 02/07/22  0532 02/08/22  0557   WBC 9.45 9.66 15.54*   HGB 14.4 13.8* 13.8*   HCT 45.2 43.3 44.1    244 273     CMP:   Recent Labs   Lab 02/06/22 2300 02/07/22  0532    138   K 4.2 4.5    103   CO2 25 24   * 177*   BUN 11 11   CREATININE 1.0 1.1   CALCIUM 9.4 9.6   PROT 7.3  --    ALBUMIN 3.6  --    BILITOT 0.4  --    ALKPHOS 92  --    AST 21  --    ALT 47*  --    ANIONGAP 9 11   EGFRNONAA >60 >60     Cardiac Markers:   Recent Labs   Lab 02/06/22 2300   BNP 29     Lactic Acid: No results for input(s): LACTATE in the last 48 hours.  Magnesium: No results for input(s): MG in the last 48 hours.  Troponin:   Recent Labs   Lab 02/06/22 2300   TROPONINI  0.007     TSH: No results for input(s): TSH in the last 4320 hours.  Urine Culture: No results for input(s): LABURIN in the last 48 hours.  Urine Studies: No results for input(s): COLORU, APPEARANCEUA, PHUR, SPECGRAV, PROTEINUA, GLUCUA, KETONESU, BILIRUBINUA, OCCULTUA, NITRITE, UROBILINOGEN, LEUKOCYTESUR, RBCUA, WBCUA, BACTERIA, SQUAMEPITHEL, HYALINECASTS in the last 48 hours.    Invalid input(s): WRIGHTSUR    Significant Imaging: I have reviewed and interpreted all pertinent imaging results/findings within the past 24 hours.     CXR- Limited examination.  No definite focal consolidation    LE US-   Bilateral DVT

## 2022-02-08 NOTE — PROGRESS NOTES
[facility] ([facility])  [facility]  Progress Note    Patient Name: Marcello Fernandez  MRN: [id number]  Patient Class: IP- Inpatient   Admission Date: 2/6/2022  Length of Stay: 1 days  Attending Physician: Caro Heart MD  Primary Care Provider: Aden Marquez MD        Subjective:     Principal Problem:Acute deep vein thrombosis (DVT) of both lower extremities        HPI:  41 y.o. morbidly obese male with PMHx of HTN and recent diagnosis of COPD who presented to ed yesterday with SOB x 3 days and chest tightness x 1 day. 3 days prior he tested positive covid. Since then, he has had worsening shortness of breath. Yesterday, he began having chest tightness which worsened.  His SOB worsens with exertion but his chest tightness has no association with exertion. He has been taking albuterol nebulizer treatments, albuterol inhaler and advair with no relief. His chest tightness is substernal, non-radiating, 7/10 in severity and constant. Endorses myalgias and intermittent palpitations. Denies fever, chills, lightheadedness, nausea, vomiting, diaphoresis or leg swelling.    CXR negative for infiltrate, WBC normal, tmax 99.3,no O2 requirment , procal not done   Subsequent LE US + Bilateral deep vein thrombosis.  Unable to perform CTA due to body habitus         Overview/Hospital Course:  1/8 Day 2/2 of IV heparin for Bilateral LE DVT in setting of Covid and morbid obesity  Plan to transition to XA inhibitor in AM ;   /61- 152/105, HR 80-90s, Afebrile, O2 sat 95-97% on RA, Getting dexamethasone; WBC 9.66> 15.54, afebrile   Stop dexa as he has no infiltrates ; no documented hypoxia .  Discussed with patient and wife the need for 3-6 m of anticoagulation ; He sees Dr ELSY marquez .  We also discussed obesity and need to loose weight ; he seems very motivated to loose weight         Past Medical History:   Diagnosis Date    Hypertension        Past Surgical History:   Procedure Laterality Date     ANKLE SURGERY Left        Review of patient's allergies indicates:  No Known Allergies    No current facility-administered medications on file prior to encounter.     Current Outpatient Medications on File Prior to Encounter   Medication Sig    baclofen (LIORESAL) 20 MG tablet Take 2 tablets (40 mg total) by mouth 2 (two) times daily.    diclofenac (VOLTAREN) 75 MG EC tablet Take 1 tablet (75 mg total) by mouth 2 (two) times daily as needed (Pain).    HYDROcodone-acetaminophen (NORCO)  mg per tablet Take 1 tablet by mouth.    HYDROcodone-acetaminophen (NORCO)  mg per tablet Take 1 tablet by mouth every 6 (six) hours as needed for Pain.    loratadine (CLARITIN) 10 mg tablet Take 10 mg by mouth once daily.    ibuprofen (ADVIL,MOTRIN) 600 MG tablet Take 1 tablet (600 mg total) by mouth every 6 (six) hours as needed for Pain.    ibuprofen (ADVIL,MOTRIN) 800 MG tablet TAKE 1 TABLET BY MOUTH THREE TIMES A DAY WITH FOOD OR MILK AS NEEDED    lisinopril (PRINIVIL,ZESTRIL) 40 MG tablet Take 1 tablet (40 mg total) by mouth once daily.    mirabegron (MYRBETRIQ) 25 mg Tb24 ER tablet Take 25 mg by mouth once daily.    omeprazole (PRILOSEC) 20 MG capsule Take 2 capsules (40 mg total) by mouth once daily.    tiZANidine (ZANAFLEX) 4 MG tablet Take 4 mg by mouth every 6 (six) hours as needed.     Family History     Problem Relation (Age of Onset)    Diabetes Mellitus Mother, Father    Hypertension Father    Stroke Paternal Grandfather        Tobacco Use    Smoking status: Current Every Day Smoker     Years: 19.00     Types: Cigars     Start date: 3/4/1996    Smokeless tobacco: Never Used    Tobacco comment: pt stated smokes 5 cigars/day   Substance and Sexual Activity    Alcohol use: Yes     Alcohol/week: 0.0 standard drinks     Comment: occasional    Drug use: No    Sexual activity: Yes     Partners: Female     Birth control/protection: None     Review of Systems   Constitutional: Negative for  activity change, fatigue, fever and unexpected weight change.   HENT: Negative for congestion, ear pain, hearing loss, rhinorrhea and sore throat.    Eyes: Negative for redness and visual disturbance.   Respiratory: Negative for cough and wheezing.    Cardiovascular: Positive for leg swelling. Negative for chest pain and palpitations.   Gastrointestinal: Negative for abdominal pain, constipation, diarrhea, nausea and vomiting.   Genitourinary: Negative for decreased urine volume, dysuria, frequency and urgency.   Musculoskeletal: Negative for back pain, joint swelling and neck pain.   Skin: Negative for color change, rash and wound.   Neurological: Negative for dizziness, tremors, weakness, light-headedness and headaches.     Objective:     Vital Signs (Most Recent):  Temp: 96.3 °F (35.7 °C) (02/08/22 0715)  Pulse: 95 (02/08/22 0715)  Resp: 20 (02/08/22 0715)  BP: (!) 152/105 (02/08/22 0715)  SpO2: 97 % (02/08/22 0748) Vital Signs (24h Range):  Temp:  [96.3 °F (35.7 °C)-98.2 °F (36.8 °C)] 96.3 °F (35.7 °C)  Pulse:  [] 95  Resp:  [18-20] 20  SpO2:  [95 %-99 %] 97 %  BP: (128-168)/() 152/105     Weight: (!) 227.7 kg (501 lb 15.8 oz)  Body mass index is 74.13 kg/m².    Physical Exam  Vitals and nursing note reviewed.   Constitutional:       General: He is not in acute distress.     Appearance: He is well-developed. He is obese.      Comments: Morbidly obese BMI 74.  501 lbs    HENT:      Head: Normocephalic and atraumatic.      Right Ear: External ear normal.      Left Ear: External ear normal.   Eyes:      General:         Right eye: No discharge.         Left eye: No discharge.      Extraocular Movements: EOM normal.   Neck:      Thyroid: No thyromegaly.   Cardiovascular:      Rate and Rhythm: Normal rate and regular rhythm.      Heart sounds: No murmur heard.      Pulmonary:      Effort: Pulmonary effort is normal. No respiratory distress.      Breath sounds: Normal breath sounds. No wheezing or rales.    Abdominal:      General: Bowel sounds are normal. There is no distension.      Palpations: Abdomen is soft.      Tenderness: There is no abdominal tenderness.   Musculoskeletal:      Cervical back: Neck supple.      Right lower leg: Edema present.      Left lower leg: Edema present.   Lymphadenopathy:      Cervical: No cervical adenopathy.   Skin:     General: Skin is warm and dry.   Neurological:      Mental Status: He is alert and oriented to person, place, and time.      Cranial Nerves: No cranial nerve deficit.   Psychiatric:         Behavior: Behavior normal.         Thought Content: Thought content normal.           CRANIAL NERVES     CN III, IV, VI   Extraocular motions are normal.        Significant Labs:   All pertinent labs within the past 24 hours have been reviewed.  A1C: No results for input(s): HGBA1C in the last 4320 hours.  ABGs: No results for input(s): PH, PCO2, HCO3, POCSATURATED, BE, TOTALHB, COHB, METHB, O2HB, POCFIO2, PO2 in the last 48 hours.  Bilirubin:   Recent Labs   Lab 02/06/22 2300   BILITOT 0.4     Blood Culture: No results for input(s): LABBLOO in the last 48 hours.  CBC:   Recent Labs   Lab 02/06/22 2300 02/07/22  0532 02/08/22  0557   WBC 9.45 9.66 15.54*   HGB 14.4 13.8* 13.8*   HCT 45.2 43.3 44.1    244 273     CMP:   Recent Labs   Lab 02/06/22 2300 02/07/22  0532    138   K 4.2 4.5    103   CO2 25 24   * 177*   BUN 11 11   CREATININE 1.0 1.1   CALCIUM 9.4 9.6   PROT 7.3  --    ALBUMIN 3.6  --    BILITOT 0.4  --    ALKPHOS 92  --    AST 21  --    ALT 47*  --    ANIONGAP 9 11   EGFRNONAA >60 >60     Cardiac Markers:   Recent Labs   Lab 02/06/22 2300   BNP 29     Lactic Acid: No results for input(s): LACTATE in the last 48 hours.  Magnesium: No results for input(s): MG in the last 48 hours.  Troponin:   Recent Labs   Lab 02/06/22 2300   TROPONINI 0.007     TSH: No results for input(s): TSH in the last 4320 hours.  Urine Culture: No results for  "input(s): LABURIN in the last 48 hours.  Urine Studies: No results for input(s): COLORU, APPEARANCEUA, PHUR, SPECGRAV, PROTEINUA, GLUCUA, KETONESU, BILIRUBINUA, OCCULTUA, NITRITE, UROBILINOGEN, LEUKOCYTESUR, RBCUA, WBCUA, BACTERIA, SQUAMEPITHEL, HYALINECASTS in the last 48 hours.    Invalid input(s): WRIGHTSUR    Significant Imaging: I have reviewed and interpreted all pertinent imaging results/findings within the past 24 hours.     CXR- Limited examination.  No definite focal consolidation    LE US-   Bilateral DVT      Assessment/Plan:      * Acute deep vein thrombosis (DVT) of both lower extremities  US demonstrating Bilateral DVT-   Right thigh veins: The common femoral, femoral, popliteal, upper greater saphenous, and deep femoral veins are patent and free of thrombus. The veins are normally compressible and have normal phasic flow and augmentation response.     Right calf veins: There is partial thrombosis of the right popliteal vein and thrombosis of the right peroneal vein.  The right posterior tibial vein is patent..     Left thigh veins: The common femoral is patent.  There is thrombosis of the mid and distal left femoral vein..     Left calf veins: There is thrombosis of the popliteal vein and peroneal vein.  The posterior anterior tibial veins are patent.    Possibly due to COVID 19. Reports h/o had h/o "bad blood flow to his leg"  Sedentary lifestyle in general but he does work and walk  Start IV heparin   Plan for 48 hrs of IV heparin then convert to X A inhibitor      2/8  Continue IV heparin   And tomorrow DC in am  With eliquis   Price check says no co pay.        COVID-19  Continue O2 treatment-keep O2 sats above 90%; o2 sat 97-99% on RA   Remdesivir - does not meet criteria   Dexamethasone day 2  lovenox- IV heparin x 48 hrs   Azithromycin, Rocephin; procal not done; CXR negative for infiltrate, WBC normal, tmax 99.3,no O2 requirement    Vit C, Vit D, multivitamin   Pepcid 20 mg daily  - COVID-19 " testing   - Infection Control notified .    - Isolation:   - Airborne, Contact and Droplet Precautions  - Cohort patients into COVID units              - Limit visitors per hospital policy              - Consolidating lab draws, nursing care, provider visits, and interventions            Sciatica of right side  Takes muscle relaxer; baclofen, gabapentin 600mg tid  and hydrocodone at home for this   Continue baclofen, gabapentin, hydrocodone prn .        Tobacco dependence syndrome  States 5-6 cigars per day   Quit 1 week ago when Dr. Rabago told him he had COPD  Denies cravings .    HTN (hypertension)  Takes lisinopril 40mg daily- continue   BP elevated intially - now 136/80  .      Morbid obesity with BMI of 50.0-59.9, adult  Placing him at increased risk with COVID diagnosis. Thus far doing well and treating for DVT that is possibly COVId induced.    # The patient is asked to make an attempt to improve diet and exercise patterns to aid in medical management of this problem.     # Eat  5 small meals a day.     # Cut out high carbohydrate  foods : bread, rice, pasta, potatoes.     # Exercise/walk 5x/week for at least 30-45  minutes.                VTE Risk Mitigation (From admission, onward)         Ordered     heparin 25,000 units in dextrose 5% (100 units/ml) IV bolus from bag - ADDITIONAL PRN BOLUS - 60 units/kg  As needed (PRN)        Question:  Heparin Infusion Adjustment (DO NOT MODIFY ANSWER)  Answer:  \[facility]\[employer]\Images\Pharmacy\HeparinInfusions\heparin HIGH INTENSITY nomogram for [facility] [id number]    02/07/22 0936     heparin 25,000 units in dextrose 5% (100 units/ml) IV bolus from bag - ADDITIONAL PRN BOLUS - 30 units/kg  As needed (PRN)        Question:  Heparin Infusion Adjustment (DO NOT MODIFY ANSWER)  Answer:  \[email or web address]\Images\Pharmacy\HeparinInfusions\heparin HIGH INTENSITY nomogram for [facility] [id number].pdf    02/07/22 0936     heparin 25,000 units in dextrose 5% 250 mL (100 units/mL) infusion  HIGH INTENSITY nomogram - [facility]  Continuous        Question Answer Comment   Heparin Infusion Adjustment (DO NOT MODIFY ANSWER) [email or web address]\epic\Images\Pharmacy\HeparinInfusions\heparin HIGH INTENSITY nomogram for [facility] [id number]    Begin at (in units/kg/hr) 18        02/07/22 0936     IP VTE HIGH RISK PATIENT  Once         02/07/22 0439     Place sequential compression device  Until discontinued         02/07/22 0439                Discharge Planning   GREY:      Code Status: Full Code   Is the patient medically ready for discharge?:     Reason for patient still in hospital (select all that apply): Treatment                     Kimberley Martinez MD  Department of Hospital Medicine   [place] [facility] S[facility] (3rd Fl)

## 2022-02-08 NOTE — ASSESSMENT & PLAN NOTE
"US demonstrating Bilateral DVT-   Right thigh veins: The common femoral, femoral, popliteal, upper greater saphenous, and deep femoral veins are patent and free of thrombus. The veins are normally compressible and have normal phasic flow and augmentation response.     Right calf veins: There is partial thrombosis of the right popliteal vein and thrombosis of the right peroneal vein.  The right posterior tibial vein is patent..     Left thigh veins: The common femoral is patent.  There is thrombosis of the mid and distal left femoral vein..     Left calf veins: There is thrombosis of the popliteal vein and peroneal vein.  The posterior anterior tibial veins are patent.    Possibly due to COVID 19. Reports h/o had h/o "bad blood flow to his leg"  Sedentary lifestyle in general but he does work and walk  Start IV heparin   Plan for 48 hrs of IV heparin then convert to X A inhibitor      2/8  Continue IV heparin   And tomorrow DC in am  With eliquis   Price check says no co pay.      "

## 2022-02-08 NOTE — ASSESSMENT & PLAN NOTE
Placing him at increased risk with COVID diagnosis. Thus far doing well and treating for DVT that is possibly COVId induced.    # The patient is asked to make an attempt to improve diet and exercise patterns to aid in medical management of this problem.     # Eat  5 small meals a day.     # Cut out high carbohydrate  foods : bread, rice, pasta, potatoes.     # Exercise/walk 5x/week for at least 30-45  minutes.

## 2022-02-09 VITALS
WEIGHT: 315 LBS | SYSTOLIC BLOOD PRESSURE: 138 MMHG | OXYGEN SATURATION: 97 % | DIASTOLIC BLOOD PRESSURE: 79 MMHG | BODY MASS INDEX: 46.65 KG/M2 | TEMPERATURE: 99 F | HEART RATE: 99 BPM | HEIGHT: 69 IN | RESPIRATION RATE: 18 BRPM

## 2022-02-09 LAB
APTT BLDCRRT: 77.9 SEC (ref 21–32)
BASOPHILS # BLD AUTO: 0.06 K/UL (ref 0–0.2)
BASOPHILS NFR BLD: 0.4 % (ref 0–1.9)
DIFFERENTIAL METHOD: ABNORMAL
EOSINOPHIL # BLD AUTO: 0 K/UL (ref 0–0.5)
EOSINOPHIL NFR BLD: 0.2 % (ref 0–8)
ERYTHROCYTE [DISTWIDTH] IN BLOOD BY AUTOMATED COUNT: 13.9 % (ref 11.5–14.5)
HCT VFR BLD AUTO: 43.9 % (ref 40–54)
HGB BLD-MCNC: 13.7 G/DL (ref 14–18)
IMM GRANULOCYTES # BLD AUTO: 0.13 K/UL (ref 0–0.04)
IMM GRANULOCYTES NFR BLD AUTO: 0.9 % (ref 0–0.5)
LYMPHOCYTES # BLD AUTO: 2.7 K/UL (ref 1–4.8)
LYMPHOCYTES NFR BLD: 19.3 % (ref 18–48)
MCH RBC QN AUTO: 28.2 PG (ref 27–31)
MCHC RBC AUTO-ENTMCNC: 31.2 G/DL (ref 32–36)
MCV RBC AUTO: 91 FL (ref 82–98)
MONOCYTES # BLD AUTO: 1.6 K/UL (ref 0.3–1)
MONOCYTES NFR BLD: 11.5 % (ref 4–15)
NEUTROPHILS # BLD AUTO: 9.3 K/UL (ref 1.8–7.7)
NEUTROPHILS NFR BLD: 67.7 % (ref 38–73)
NRBC BLD-RTO: 0 /100 WBC
PLATELET # BLD AUTO: 245 K/UL (ref 150–450)
PMV BLD AUTO: 10.4 FL (ref 9.2–12.9)
RBC # BLD AUTO: 4.85 M/UL (ref 4.6–6.2)
WBC # BLD AUTO: 13.73 K/UL (ref 3.9–12.7)

## 2022-02-09 PROCEDURE — 99238 PR HOSPITAL DISCHARGE DAY,<30 MIN: ICD-10-PCS | Mod: ,,, | Performed by: FAMILY MEDICINE

## 2022-02-09 PROCEDURE — 25000003 PHARM REV CODE 250: Performed by: STUDENT IN AN ORGANIZED HEALTH CARE EDUCATION/TRAINING PROGRAM

## 2022-02-09 PROCEDURE — 85730 THROMBOPLASTIN TIME PARTIAL: CPT | Performed by: NURSE PRACTITIONER

## 2022-02-09 PROCEDURE — 85025 COMPLETE CBC W/AUTO DIFF WBC: CPT | Performed by: NURSE PRACTITIONER

## 2022-02-09 PROCEDURE — 99238 HOSP IP/OBS DSCHRG MGMT 30/<: CPT | Mod: ,,, | Performed by: FAMILY MEDICINE

## 2022-02-09 PROCEDURE — 94760 N-INVAS EAR/PLS OXIMETRY 1: CPT

## 2022-02-09 PROCEDURE — 25000003 PHARM REV CODE 250: Performed by: INTERNAL MEDICINE

## 2022-02-09 PROCEDURE — 63600175 PHARM REV CODE 636 W HCPCS: Performed by: NURSE PRACTITIONER

## 2022-02-09 PROCEDURE — 63600175 PHARM REV CODE 636 W HCPCS: Performed by: INTERNAL MEDICINE

## 2022-02-09 PROCEDURE — 0001A HC IMMUNIZ ADMIN, SARS-COV-2 COVID-19 VACC, 30MCG/0.3ML, 1ST DOSE: CPT | Performed by: INTERNAL MEDICINE

## 2022-02-09 PROCEDURE — 36415 COLL VENOUS BLD VENIPUNCTURE: CPT | Performed by: NURSE PRACTITIONER

## 2022-02-09 PROCEDURE — 25000003 PHARM REV CODE 250: Performed by: NURSE PRACTITIONER

## 2022-02-09 PROCEDURE — 91300 PHARM REV CODE 636 W HCPCS: CPT | Performed by: INTERNAL MEDICINE

## 2022-02-09 RX ORDER — GABAPENTIN 300 MG/1
600 CAPSULE ORAL 3 TIMES DAILY
COMMUNITY
End: 2023-05-19

## 2022-02-09 RX ORDER — ALBUTEROL SULFATE 90 UG/1
8 AEROSOL, METERED RESPIRATORY (INHALATION) EVERY 6 HOURS PRN
Qty: 18 G | Refills: 0 | Status: ON HOLD | OUTPATIENT
Start: 2022-02-09 | End: 2022-02-18 | Stop reason: SDUPTHER

## 2022-02-09 RX ORDER — ASCORBIC ACID 500 MG
500 TABLET ORAL 2 TIMES DAILY
Qty: 60 TABLET | Refills: 0 | Status: SHIPPED | OUTPATIENT
Start: 2022-02-09 | End: 2022-02-13

## 2022-02-09 RX ADMIN — RNA INGREDIENT BNT-162B2 0.3 ML: 0.23 INJECTION, SUSPENSION INTRAMUSCULAR at 02:02

## 2022-02-09 RX ADMIN — HEPARIN SODIUM 19 UNITS/KG/HR: 10000 INJECTION, SOLUTION INTRAVENOUS at 01:02

## 2022-02-09 RX ADMIN — OXYCODONE HYDROCHLORIDE AND ACETAMINOPHEN 500 MG: 500 TABLET ORAL at 09:02

## 2022-02-09 RX ADMIN — HYDROCODONE BITARTRATE AND ACETAMINOPHEN 1 TABLET: 10; 325 TABLET ORAL at 09:02

## 2022-02-09 RX ADMIN — FAMOTIDINE 20 MG: 20 TABLET ORAL at 09:02

## 2022-02-09 RX ADMIN — GABAPENTIN 600 MG: 300 CAPSULE ORAL at 09:02

## 2022-02-09 RX ADMIN — BACLOFEN 40 MG: 10 TABLET ORAL at 09:02

## 2022-02-09 RX ADMIN — LISINOPRIL 40 MG: 40 TABLET ORAL at 09:02

## 2022-02-09 RX ADMIN — THERA TABS 1 TABLET: TAB at 09:02

## 2022-02-09 RX ADMIN — MIRABEGRON 25 MG: 25 TABLET, FILM COATED, EXTENDED RELEASE ORAL at 09:02

## 2022-02-09 RX ADMIN — APIXABAN 10 MG: 5 TABLET, FILM COATED ORAL at 09:02

## 2022-02-09 NOTE — PLAN OF CARE
Patient denies SOB. No distress noted. Heparin infusion stopped. Patient started on Eliquis and instructed. Patient received Covid booster prior to discharge. No reaction noted. RX delivered at bedside prior to discharge. Reviewed AVS for SVT and bleeding precautions. Reviewed Eliquis. In agreement with plan of care for home. No further questions.

## 2022-02-09 NOTE — SUBJECTIVE & OBJECTIVE
Past Medical History:   Diagnosis Date    Hypertension        Past Surgical History:   Procedure Laterality Date    ANKLE SURGERY Left        Review of patient's allergies indicates:  No Known Allergies    No current facility-administered medications on file prior to encounter.     Current Outpatient Medications on File Prior to Encounter   Medication Sig    baclofen (LIORESAL) 20 MG tablet Take 2 tablets (40 mg total) by mouth 2 (two) times daily.    diclofenac (VOLTAREN) 75 MG EC tablet Take 1 tablet (75 mg total) by mouth 2 (two) times daily as needed (Pain).    HYDROcodone-acetaminophen (NORCO)  mg per tablet Take 1 tablet by mouth.    HYDROcodone-acetaminophen (NORCO)  mg per tablet Take 1 tablet by mouth every 6 (six) hours as needed for Pain.    loratadine (CLARITIN) 10 mg tablet Take 10 mg by mouth once daily.    ibuprofen (ADVIL,MOTRIN) 600 MG tablet Take 1 tablet (600 mg total) by mouth every 6 (six) hours as needed for Pain.    ibuprofen (ADVIL,MOTRIN) 800 MG tablet TAKE 1 TABLET BY MOUTH THREE TIMES A DAY WITH FOOD OR MILK AS NEEDED    lisinopril (PRINIVIL,ZESTRIL) 40 MG tablet Take 1 tablet (40 mg total) by mouth once daily.    mirabegron (MYRBETRIQ) 25 mg Tb24 ER tablet Take 25 mg by mouth once daily.    omeprazole (PRILOSEC) 20 MG capsule Take 2 capsules (40 mg total) by mouth once daily.    tiZANidine (ZANAFLEX) 4 MG tablet Take 4 mg by mouth every 6 (six) hours as needed.     Family History     Problem Relation (Age of Onset)    Diabetes Mellitus Mother, Father    Hypertension Father    Stroke Paternal Grandfather        Tobacco Use    Smoking status: Current Every Day Smoker     Years: 19.00     Types: Cigars     Start date: 3/4/1996    Smokeless tobacco: Never Used    Tobacco comment: pt stated smokes 5 cigars/day   Substance and Sexual Activity    Alcohol use: Yes     Alcohol/week: 0.0 standard drinks     Comment: occasional    Drug use: No    Sexual activity: Yes      Partners: Female     Birth control/protection: None     Review of Systems   Constitutional: Negative for activity change, fatigue, fever and unexpected weight change.   HENT: Negative for congestion, ear pain, hearing loss, rhinorrhea and sore throat.    Eyes: Negative for redness and visual disturbance.   Respiratory: Negative for cough and wheezing.    Cardiovascular: Positive for leg swelling. Negative for chest pain and palpitations.   Gastrointestinal: Negative for abdominal pain, constipation, diarrhea, nausea and vomiting.   Genitourinary: Negative for decreased urine volume, dysuria, frequency and urgency.   Musculoskeletal: Negative for back pain, joint swelling and neck pain.   Skin: Negative for color change, rash and wound.   Neurological: Negative for dizziness, tremors, weakness, light-headedness and headaches.     Objective:     Vital Signs (Most Recent):  Temp: 96.9 °F (36.1 °C) (02/09/22 0744)  Pulse: 90 (02/09/22 0800)  Resp: 17 (02/09/22 0744)  BP: 137/86 (02/09/22 0744)  SpO2: 98 % (02/09/22 0744) Vital Signs (24h Range):  Temp:  [96.9 °F (36.1 °C)-98.2 °F (36.8 °C)] 96.9 °F (36.1 °C)  Pulse:  [] 90  Resp:  [17-20] 17  SpO2:  [94 %-99 %] 98 %  BP: (112-152)/(59-86) 137/86     Weight: (!) 227.7 kg (501 lb 15.8 oz)  Body mass index is 74.13 kg/m².    Physical Exam  Vitals and nursing note reviewed.   Constitutional:       General: He is not in acute distress.     Appearance: He is well-developed. He is obese.      Comments: Morbidly obese BMI 74.  501 lbs    HENT:      Head: Normocephalic and atraumatic.      Right Ear: External ear normal.      Left Ear: External ear normal.   Eyes:      General:         Right eye: No discharge.         Left eye: No discharge.      Extraocular Movements: EOM normal.   Neck:      Thyroid: No thyromegaly.   Cardiovascular:      Rate and Rhythm: Normal rate and regular rhythm.      Heart sounds: No murmur heard.      Pulmonary:      Effort: Pulmonary effort  is normal. No respiratory distress.      Breath sounds: Normal breath sounds. No wheezing or rales.   Abdominal:      General: Bowel sounds are normal. There is no distension.      Palpations: Abdomen is soft.      Tenderness: There is no abdominal tenderness.   Musculoskeletal:      Cervical back: Neck supple.      Right lower leg: Edema present.      Left lower leg: Edema present.   Lymphadenopathy:      Cervical: No cervical adenopathy.   Skin:     General: Skin is warm and dry.   Neurological:      Mental Status: He is alert and oriented to person, place, and time.      Cranial Nerves: No cranial nerve deficit.   Psychiatric:         Behavior: Behavior normal.         Thought Content: Thought content normal.           CRANIAL NERVES     CN III, IV, VI   Extraocular motions are normal.        Significant Labs:   All pertinent labs within the past 24 hours have been reviewed.  A1C: No results for input(s): HGBA1C in the last 4320 hours.  ABGs: No results for input(s): PH, PCO2, HCO3, POCSATURATED, BE, TOTALHB, COHB, METHB, O2HB, POCFIO2, PO2 in the last 48 hours.  Bilirubin:   Recent Labs   Lab 02/06/22  2300   BILITOT 0.4     Blood Culture: No results for input(s): LABBLOO in the last 48 hours.  CBC:   Recent Labs   Lab 02/08/22  0557 02/09/22  0630   WBC 15.54* 13.73*   HGB 13.8* 13.7*   HCT 44.1 43.9    245     CMP:   No results for input(s): NA, K, CL, CO2, GLU, BUN, CREATININE, CALCIUM, PROT, ALBUMIN, BILITOT, ALKPHOS, AST, ALT, ANIONGAP, EGFRNONAA in the last 48 hours.    Invalid input(s): ESTGF[place]  Cardiac Markers:   No results for input(s): CKMB, MYOGLOBIN, BNP, TROPISTAT in the last 48 hours.  Lactic Acid: No results for input(s): LACTATE in the last 48 hours.  Magnesium: No results for input(s): MG in the last 48 hours.  Troponin:   No results for input(s): TROPONINI in the last 48 hours.  TSH: No results for input(s): TSH in the last 4320 hours.  Urine Culture: No results for input(s): LABURIN  in the last 48 hours.  Urine Studies: No results for input(s): COLORU, APPEARANCEUA, PHUR, SPECGRAV, PROTEINUA, GLUCUA, KETONESU, BILIRUBINUA, OCCULTUA, NITRITE, UROBILINOGEN, LEUKOCYTESUR, RBCUA, WBCUA, BACTERIA, SQUAMEPITHEL, HYALINECASTS in the last 48 hours.    Invalid input(s): WRIGHTSUR    Significant Imaging: I have reviewed and interpreted all pertinent imaging results/findings within the past 24 hours.     CXR- Limited examination.  No definite focal consolidation    LE US-   Bilateral DVT

## 2022-02-09 NOTE — SUBJECTIVE & OBJECTIVE
Past Medical History:   Diagnosis Date    Hypertension        Past Surgical History:   Procedure Laterality Date    ANKLE SURGERY Left        Review of patient's allergies indicates:  No Known Allergies    No current facility-administered medications on file prior to encounter.     Current Outpatient Medications on File Prior to Encounter   Medication Sig    baclofen (LIORESAL) 20 MG tablet Take 2 tablets (40 mg total) by mouth 2 (two) times daily.    HYDROcodone-acetaminophen (NORCO)  mg per tablet Take 1 tablet by mouth every 6 (six) hours as needed for Pain.    loratadine (CLARITIN) 10 mg tablet Take 10 mg by mouth once daily.    [DISCONTINUED] diclofenac (VOLTAREN) 75 MG EC tablet Take 1 tablet (75 mg total) by mouth 2 (two) times daily as needed (Pain).    [DISCONTINUED] HYDROcodone-acetaminophen (NORCO)  mg per tablet Take 1 tablet by mouth.    gabapentin (NEURONTIN) 300 MG capsule Take 600 mg by mouth 3 (three) times daily.    lisinopril (PRINIVIL,ZESTRIL) 40 MG tablet Take 1 tablet (40 mg total) by mouth once daily.    mirabegron (MYRBETRIQ) 25 mg Tb24 ER tablet Take 25 mg by mouth once daily.    omeprazole (PRILOSEC) 20 MG capsule Take 2 capsules (40 mg total) by mouth once daily.    tiZANidine (ZANAFLEX) 4 MG tablet Take 4 mg by mouth every 6 (six) hours as needed.    [DISCONTINUED] ibuprofen (ADVIL,MOTRIN) 600 MG tablet Take 1 tablet (600 mg total) by mouth every 6 (six) hours as needed for Pain.    [DISCONTINUED] ibuprofen (ADVIL,MOTRIN) 800 MG tablet TAKE 1 TABLET BY MOUTH THREE TIMES A DAY WITH FOOD OR MILK AS NEEDED     Family History     Problem Relation (Age of Onset)    Diabetes Mellitus Mother, Father    Hypertension Father    Stroke Paternal Grandfather        Tobacco Use    Smoking status: Current Every Day Smoker     Years: 19.00     Types: Cigars     Start date: 3/4/1996    Smokeless tobacco: Never Used    Tobacco comment: pt stated smokes 5 cigars/day   Substance  and Sexual Activity    Alcohol use: Yes     Alcohol/week: 0.0 standard drinks     Comment: occasional    Drug use: No    Sexual activity: Yes     Partners: Female     Birth control/protection: None     Review of Systems   Constitutional: Negative for activity change, fatigue, fever and unexpected weight change.   HENT: Negative for congestion, ear pain, hearing loss, rhinorrhea and sore throat.    Eyes: Negative for redness and visual disturbance.   Respiratory: Negative for cough and wheezing.    Cardiovascular: Positive for leg swelling. Negative for chest pain and palpitations.   Gastrointestinal: Negative for abdominal pain, constipation, diarrhea, nausea and vomiting.   Genitourinary: Negative for decreased urine volume, dysuria, frequency and urgency.   Musculoskeletal: Negative for back pain, joint swelling and neck pain.   Skin: Negative for color change, rash and wound.   Neurological: Negative for dizziness, tremors, weakness, light-headedness and headaches.     Objective:     Vital Signs (Most Recent):  Temp: 98.7 °F (37.1 °C) (02/09/22 1201)  Pulse: 89 (02/09/22 1201)  Resp: 18 (02/09/22 1201)  BP: 138/79 (02/09/22 1201)  SpO2: 97 % (02/09/22 1201) Vital Signs (24h Range):  Temp:  [96.9 °F (36.1 °C)-98.7 °F (37.1 °C)] 98.7 °F (37.1 °C)  Pulse:  [] 89  Resp:  [17-20] 18  SpO2:  [94 %-99 %] 97 %  BP: (126-152)/(59-86) 138/79     Weight: (!) 227.7 kg (501 lb 15.8 oz)  Body mass index is 74.13 kg/m².    Physical Exam  Vitals and nursing note reviewed.   Constitutional:       General: He is not in acute distress.     Appearance: He is well-developed. He is obese.      Comments: Morbidly obese BMI 74.  501 lbs    HENT:      Head: Normocephalic and atraumatic.      Right Ear: External ear normal.      Left Ear: External ear normal.   Eyes:      General:         Right eye: No discharge.         Left eye: No discharge.      Extraocular Movements: EOM normal.   Neck:      Thyroid: No thyromegaly.    Cardiovascular:      Rate and Rhythm: Normal rate and regular rhythm.      Heart sounds: No murmur heard.      Pulmonary:      Effort: Pulmonary effort is normal. No respiratory distress.      Breath sounds: Normal breath sounds. No wheezing or rales.   Abdominal:      General: Bowel sounds are normal. There is no distension.      Palpations: Abdomen is soft.      Tenderness: There is no abdominal tenderness.   Musculoskeletal:      Cervical back: Neck supple.      Right lower leg: Edema present.      Left lower leg: Edema present.   Lymphadenopathy:      Cervical: No cervical adenopathy.   Skin:     General: Skin is warm and dry.   Neurological:      Mental Status: He is alert and oriented to person, place, and time.      Cranial Nerves: No cranial nerve deficit.   Psychiatric:         Behavior: Behavior normal.         Thought Content: Thought content normal.           CRANIAL NERVES     CN III, IV, VI   Extraocular motions are normal.        Significant Labs:   All pertinent labs within the past 24 hours have been reviewed.  A1C: No results for input(s): HGBA1C in the last 4320 hours.  ABGs: No results for input(s): PH, PCO2, HCO3, POCSATURATED, BE, TOTALHB, COHB, METHB, O2HB, POCFIO2, PO2 in the last 48 hours.  Bilirubin:   Recent Labs   Lab 02/06/22  2300   BILITOT 0.4     Blood Culture: No results for input(s): LABBLOO in the last 48 hours.  CBC:   Recent Labs   Lab 02/08/22  0557 02/09/22  0630   WBC 15.54* 13.73*   HGB 13.8* 13.7*   HCT 44.1 43.9    245     CMP:   No results for input(s): NA, K, CL, CO2, GLU, BUN, CREATININE, CALCIUM, PROT, ALBUMIN, BILITOT, ALKPHOS, AST, ALT, ANIONGAP, EGFRNONAA in the last 48 hours.    Invalid input(s): ESTGF[place]  Cardiac Markers:   No results for input(s): CKMB, MYOGLOBIN, BNP, TROPISTAT in the last 48 hours.  Lactic Acid: No results for input(s): LACTATE in the last 48 hours.  Magnesium: No results for input(s): MG in the last 48 hours.  Troponin:   No results  for input(s): TROPONINI in the last 48 hours.  TSH: No results for input(s): TSH in the last 4320 hours.  Urine Culture: No results for input(s): LABURIN in the last 48 hours.  Urine Studies: No results for input(s): COLORU, APPEARANCEUA, PHUR, SPECGRAV, PROTEINUA, GLUCUA, KETONESU, BILIRUBINUA, OCCULTUA, NITRITE, UROBILINOGEN, LEUKOCYTESUR, RBCUA, WBCUA, BACTERIA, SQUAMEPITHEL, HYALINECASTS in the last 48 hours.    Invalid input(s): WRIGHTSUR    Significant Imaging: I have reviewed and interpreted all pertinent imaging results/findings within the past 24 hours.     CXR- Limited examination.  No definite focal consolidation    LE US-   Bilateral DVT

## 2022-02-09 NOTE — PROGRESS NOTES
[facility] ([facility])  [facility] Medicine  Progress Note    Patient Name: Marcello Fernandez  MRN: [id number]  Patient Class: IP- Inpatient   Admission Date: 2/6/2022  Length of Stay: 2 days  Attending Physician: Caro Heart MD  Primary Care Provider: Aden Marquez MD        Subjective:     Principal Problem:Acute deep vein thrombosis (DVT) of both lower extremities        HPI:  41 y.o. morbidly obese male with PMHx of HTN and recent diagnosis of COPD who presented to ed yesterday with SOB x 3 days and chest tightness x 1 day. 3 days prior he tested positive covid. Since then, he has had worsening shortness of breath. Yesterday, he began having chest tightness which worsened.  His SOB worsens with exertion but his chest tightness has no association with exertion. He has been taking albuterol nebulizer treatments, albuterol inhaler and advair with no relief. His chest tightness is substernal, non-radiating, 7/10 in severity and constant. Endorses myalgias and intermittent palpitations. Denies fever, chills, lightheadedness, nausea, vomiting, diaphoresis or leg swelling.    CXR negative for infiltrate, WBC normal, tmax 99.3,no O2 requirment , procal not done   Subsequent LE US + Bilateral deep vein thrombosis.  Unable to perform CTA due to body habitus         Overview/Hospital Course:  1/8 Day 2/2 of IV heparin for Bilateral LE DVT in setting of Covid and morbid obesity  Plan to transition to XA inhibitor in AM ;   /61- 152/105, HR 80-90s, Afebrile, O2 sat 95-97% on RA, Getting dexamethasone; WBC 9.66> 15.54, afebrile   Stop dexa as he has no infiltrates ; no documented hypoxia .  Discussed with patient and wife the need for 3-6 m of anticoagulation ; He sees Dr ELSY marquez .  We also discussed obesity and need to loose weight ; he seems very motivated to loose weight     2/9 40 YO M with recent COVID 19 admitted with SOB and chest tightness   Completed 2 full days of IV heparin for  Bilateral LE DVT in setting of Covid and morbid obesity   will transition to Eliquis 10mg bid x 7 days; he has + Bilateral DVT; Unknown PE due to unable to perform CTA due to body habitus,   /86 HR 80-90s, Afebrile, O2 sat 95-97% on RA, Getting dexamethasone; WBC 9.66> 15.54, afebrile   Stop dexa as he has no infiltrates ; no documented hypoxia .  need for 3-6 m of anticoagulation ; He sees Dr ELSY marquez .  We also discussed obesity and need to loose weight ; he seems very motivated to loose weight             Past Medical History:   Diagnosis Date    Hypertension        Past Surgical History:   Procedure Laterality Date    ANKLE SURGERY Left        Review of patient's allergies indicates:  No Known Allergies    No current facility-administered medications on file prior to encounter.     Current Outpatient Medications on File Prior to Encounter   Medication Sig    baclofen (LIORESAL) 20 MG tablet Take 2 tablets (40 mg total) by mouth 2 (two) times daily.    HYDROcodone-acetaminophen (NORCO)  mg per tablet Take 1 tablet by mouth every 6 (six) hours as needed for Pain.    loratadine (CLARITIN) 10 mg tablet Take 10 mg by mouth once daily.    [DISCONTINUED] diclofenac (VOLTAREN) 75 MG EC tablet Take 1 tablet (75 mg total) by mouth 2 (two) times daily as needed (Pain).    [DISCONTINUED] HYDROcodone-acetaminophen (NORCO)  mg per tablet Take 1 tablet by mouth.    gabapentin (NEURONTIN) 300 MG capsule Take 600 mg by mouth 3 (three) times daily.    lisinopril (PRINIVIL,ZESTRIL) 40 MG tablet Take 1 tablet (40 mg total) by mouth once daily.    mirabegron (MYRBETRIQ) 25 mg Tb24 ER tablet Take 25 mg by mouth once daily.    omeprazole (PRILOSEC) 20 MG capsule Take 2 capsules (40 mg total) by mouth once daily.    tiZANidine (ZANAFLEX) 4 MG tablet Take 4 mg by mouth every 6 (six) hours as needed.    [DISCONTINUED] ibuprofen (ADVIL,MOTRIN) 600 MG tablet Take 1 tablet (600 mg total) by mouth every 6 (six)  hours as needed for Pain.    [DISCONTINUED] ibuprofen (ADVIL,MOTRIN) 800 MG tablet TAKE 1 TABLET BY MOUTH THREE TIMES A DAY WITH FOOD OR MILK AS NEEDED     Family History     Problem Relation (Age of Onset)    Diabetes Mellitus Mother, Father    Hypertension Father    Stroke Paternal Grandfather        Tobacco Use    Smoking status: Current Every Day Smoker     Years: 19.00     Types: Cigars     Start date: 3/4/1996    Smokeless tobacco: Never Used    Tobacco comment: pt stated smokes 5 cigars/day   Substance and Sexual Activity    Alcohol use: Yes     Alcohol/week: 0.0 standard drinks     Comment: occasional    Drug use: No    Sexual activity: Yes     Partners: Female     Birth control/protection: None     Review of Systems   Constitutional: Negative for activity change, fatigue, fever and unexpected weight change.   HENT: Negative for congestion, ear pain, hearing loss, rhinorrhea and sore throat.    Eyes: Negative for redness and visual disturbance.   Respiratory: Negative for cough and wheezing.    Cardiovascular: Positive for leg swelling. Negative for chest pain and palpitations.   Gastrointestinal: Negative for abdominal pain, constipation, diarrhea, nausea and vomiting.   Genitourinary: Negative for decreased urine volume, dysuria, frequency and urgency.   Musculoskeletal: Negative for back pain, joint swelling and neck pain.   Skin: Negative for color change, rash and wound.   Neurological: Negative for dizziness, tremors, weakness, light-headedness and headaches.     Objective:     Vital Signs (Most Recent):  Temp: 98.7 °F (37.1 °C) (02/09/22 1201)  Pulse: 89 (02/09/22 1201)  Resp: 18 (02/09/22 1201)  BP: 138/79 (02/09/22 1201)  SpO2: 97 % (02/09/22 1201) Vital Signs (24h Range):  Temp:  [96.9 °F (36.1 °C)-98.7 °F (37.1 °C)] 98.7 °F (37.1 °C)  Pulse:  [] 89  Resp:  [17-20] 18  SpO2:  [94 %-99 %] 97 %  BP: (126-152)/(59-86) 138/79     Weight: (!) 227.7 kg (501 lb 15.8 oz)  Body mass index is  74.13 kg/m².    Physical Exam  Vitals and nursing note reviewed.   Constitutional:       General: He is not in acute distress.     Appearance: He is well-developed. He is obese.      Comments: Morbidly obese BMI 74.  501 lbs    HENT:      Head: Normocephalic and atraumatic.      Right Ear: External ear normal.      Left Ear: External ear normal.   Eyes:      General:         Right eye: No discharge.         Left eye: No discharge.      Extraocular Movements: EOM normal.   Neck:      Thyroid: No thyromegaly.   Cardiovascular:      Rate and Rhythm: Normal rate and regular rhythm.      Heart sounds: No murmur heard.      Pulmonary:      Effort: Pulmonary effort is normal. No respiratory distress.      Breath sounds: Normal breath sounds. No wheezing or rales.   Abdominal:      General: Bowel sounds are normal. There is no distension.      Palpations: Abdomen is soft.      Tenderness: There is no abdominal tenderness.   Musculoskeletal:      Cervical back: Neck supple.      Right lower leg: Edema present.      Left lower leg: Edema present.   Lymphadenopathy:      Cervical: No cervical adenopathy.   Skin:     General: Skin is warm and dry.   Neurological:      Mental Status: He is alert and oriented to person, place, and time.      Cranial Nerves: No cranial nerve deficit.   Psychiatric:         Behavior: Behavior normal.         Thought Content: Thought content normal.           CRANIAL NERVES     CN III, IV, VI   Extraocular motions are normal.        Significant Labs:   All pertinent labs within the past 24 hours have been reviewed.  A1C: No results for input(s): HGBA1C in the last 4320 hours.  ABGs: No results for input(s): PH, PCO2, HCO3, POCSATURATED, BE, TOTALHB, COHB, METHB, O2HB, POCFIO2, PO2 in the last 48 hours.  Bilirubin:   Recent Labs   Lab 02/06/22  2300   BILITOT 0.4     Blood Culture: No results for input(s): LABBLOO in the last 48 hours.  CBC:   Recent Labs   Lab 02/08/22  0557 02/09/22  0630   WBC  15.54* 13.73*   HGB 13.8* 13.7*   HCT 44.1 43.9    245     CMP:   No results for input(s): NA, K, CL, CO2, GLU, BUN, CREATININE, CALCIUM, PROT, ALBUMIN, BILITOT, ALKPHOS, AST, ALT, ANIONGAP, EGFRNONAA in the last 48 hours.    Invalid input(s): ESTGFAFRICA  Cardiac Markers:   No results for input(s): CKMB, MYOGLOBIN, BNP, TROPISTAT in the last 48 hours.  Lactic Acid: No results for input(s): LACTATE in the last 48 hours.  Magnesium: No results for input(s): MG in the last 48 hours.  Troponin:   No results for input(s): TROPONINI in the last 48 hours.  TSH: No results for input(s): TSH in the last 4320 hours.  Urine Culture: No results for input(s): LABURIN in the last 48 hours.  Urine Studies: No results for input(s): COLORU, APPEARANCEUA, PHUR, SPECGRAV, PROTEINUA, GLUCUA, KETONESU, BILIRUBINUA, OCCULTUA, NITRITE, UROBILINOGEN, LEUKOCYTESUR, RBCUA, WBCUA, BACTERIA, SQUAMEPITHEL, HYALINECASTS in the last 48 hours.    Invalid input(s): WRIGHTSUR    Significant Imaging: I have reviewed and interpreted all pertinent imaging results/findings within the past 24 hours.     CXR- Limited examination.  No definite focal consolidation    LE US-   Bilateral DVT      Assessment/Plan:      * Acute deep vein thrombosis (DVT) of both lower extremities  US demonstrating Bilateral DVT-   Right thigh veins: The common femoral, femoral, popliteal, upper greater saphenous, and deep femoral veins are patent and free of thrombus. The veins are normally compressible and have normal phasic flow and augmentation response.     Right calf veins: There is partial thrombosis of the right popliteal vein and thrombosis of the right peroneal vein.  The right posterior tibial vein is patent..     Left thigh veins: The common femoral is patent.  There is thrombosis of the mid and distal left femoral vein..     Left calf veins: There is thrombosis of the popliteal vein and peroneal vein.  The posterior anterior tibial veins are  "patent.    Possibly due to COVID 19. Reports h/o had h/o "bad blood flow to his leg"  Sedentary lifestyle in general but he does work and walk  Start IV heparin   Plan for 48 hrs of IV heparin then convert to X A inhibitor      2/8  Continue IV heparin   And tomorrow DC in am  With eliquis   Price check says no co pay.    2/9 d/c home with eliquis , f/u with PCP     COVID-19  Continue O2 treatment-keep O2 sats above 90%; o2 sat 97-99% on RA   Remdesivir - does not meet criteria   Dexamethasone day 2  lovenox- IV heparin x 48 hrs   Azithromycin, Rocephin; procal not done; CXR negative for infiltrate, WBC normal, tmax 99.3,no O2 requirement    Vit C, Vit D, multivitamin   Pepcid 20 mg daily  - COVID-19 testing   - Infection Control notified .    - Isolation:   - Airborne, Contact and Droplet Precautions  - Cohort patients into COVID units              - Limit visitors per hospital policy              - Consolidating lab draws, nursing care, provider visits, and interventions            Sciatica of right side  Takes muscle relaxer; baclofen, gabapentin 600mg tid  and hydrocodone at home for this   Continue baclofen, gabapentin, hydrocodone prn .        Tobacco dependence syndrome  States 5-6 cigars per day   Quit 1 week ago when Dr. Rabago told him he had COPD  Denies cravings .    HTN (hypertension)  Takes lisinopril 40mg daily- continue   BP elevated intially - now 136/80  .      Morbid obesity with BMI of 50.0-59.9, adult  Placing him at increased risk with COVID diagnosis. Thus far doing well and treating for DVT that is possibly COVId induced.    # The patient is asked to make an attempt to improve diet and exercise patterns to aid in medical management of this problem.     # Eat  5 small meals a day.     # Cut out high carbohydrate  foods : bread, rice, pasta, potatoes.     # Exercise/walk 5x/week for at least 30-45  minutes.                VTE Risk Mitigation (From admission, onward)         Ordered     apixaban " tablet 10 mg  2 times daily         02/09/22 0835     IP VTE HIGH RISK PATIENT  Once         02/07/22 0439     Place sequential compression device  Until discontinued         02/07/22 0439                Discharge Planning   GREY: 2/9/2022     Code Status: Full Code   Is the patient medically ready for discharge?:     Reason for patient still in hospital (select all that apply): Treatment  Discharge Plan A: Home                  Kali Vargas MD  Department of Hospital Medicine   [place] [facility] (3rd Fl)

## 2022-02-09 NOTE — DISCHARGE INSTRUCTIONS
Patient Education       Deep Vein Thrombosis (Blood Clots in the Legs) Discharge Instructions   About this topic   A blood clot in a vein deep inside your leg is a deep vein thrombosis. It is also called a DVT. You may have pain, warmth, redness, or swelling along the vein. A DVT can affect the large veins in your lower leg and thigh. The blood clot can travel to other parts of your body and cause serious problems. If a clot travels to the lungs, it will be hard for you to breathe. This is a serious problem that can also lead to death.     Your body will usually absorb the clot. This may take a few days to weeks or months. Your doctor will give you drugs to keep your clot from getting bigger and to prevent any more blood clots.  What care is needed at home?   · Ask your doctor what you need to do when you go home. Make sure you ask questions if you do not understand what the doctor says. This way you will know what you need to do.  · Ask your doctor about how long to rest your leg and when to start getting more active.  · Prop your leg on a pillow when in bed and on a chair or footstool when you sit.  · Your doctor may advise you to wear pressure or compression stockings to help with blood flow. These may not be ordered until this clot goes away fully.  · Wear loose clothing.     What follow-up care is needed?   Your doctor may ask you to make visits to the office to check on your progress. Be sure to keep these visits. You may have to get lab work done to make sure the drugs are working as they should.  What drugs may be needed?   Take your drugs as ordered by your doctor. Be sure to follow your doctor's orders closely. The doctor may order drugs called blood thinners. Some blood thinners are taken as a pill and some are taken as a shot into your stomach area. Blood thinners decrease the ability of the blood to clot. Too much of this drug may cause bleeding. Talk to your doctor about all the drugs you take. This  includes over-the-counter (OTC) drugs and herbal products or supplements. Foods rich in vitamin K may have an effect on your blood thinning medication.  · Your doctor will tell you if you have to make any changes in your diet while taking these drugs.  · Blow your nose very gently or dab your nose instead of blowing. You might have some nosebleeds while taking these drugs. Let your doctor know if your nose bleeds.  · You will bruise easier while taking blood thinning drugs. Be careful not to cut yourself or bang into objects.  · Tell all healthcare providers, including dentists, that you are taking blood thinners.  · Get a medical alert tag to let people know you are taking blood thinners.  · Your doctor may also order drugs to help with pain and swelling.     Will physical activity be limited?   Moving around and exercise are important for you. Talk to your doctor about what activities are safe for you to do. Avoid any activity that may result in injury.  What can be done to prevent this health problem?   · Do not sit for long periods of time. Get up and move around every 1 to 2 hours, unless you are sleeping. Take breaks on car trips to stand and move around. On plane trips, try to stand and walk at times.  · Stretch your legs and wiggle your toes during long trips. Try raising your heels and then your toes. Change position often.  · Drink 6 to 8 glasses of water each day unless your doctor tells you not to.  · Do not drink beer, wine, and mixed drinks (alcohol).  · Do not smoke.  · If you are overweight, you should lose weight.  · Avoid wearing tight clothing.  · Talk to your doctor if you take hormone supplements or birth control pills.  When do I need to call the doctor?   · Problems with your legs like:  ? Swelling of legs or change in size of one leg  ? No feeling in your leg or foot  ? Tingling sensation in your leg or foot that does not go away  ? Pain when walking  ? Tenderness or cramping in your leg,  especially in your calf  · Other problems like:  ? Trouble breathing  ? Chest pain  ? Pale skin or your skin feels cold or hot to touch  ? Feeling faint or dizzy  · Problems with bleeding like:  ? Coughing up blood  ? Bleeding from anywhere on your body that will not stop  ? Blood in your stool or urine  Teach Back: Helping You Understand   The Teach Back Method helps you understand the information we are giving you. After you talk with the staff, tell them in your own words what you learned. This helps to make sure the staff has described each thing clearly. It also helps to explain things that may have been confusing. Before going home, make sure you can do these:  · I can tell you about my condition.  · I can tell you the best position for my leg.  · I can tell you what I will do if my leg is pale or cool to the touch.  Where can I learn more?   American Academy of Family Physicians  https://familydoctor.org/condition/deep-vein-thrombosis/   Better Health Channel  https://www.betterhealth.[place].gov.[place]/health/ConditionsAndTreatments/deep-vein-thrombosis   NHS Choices  http://www.nhs.uk/conditions/deep-vein-thrombosis/pages/introduction.aspx   Last Reviewed Date   2021-03-26  Consumer Information Use and Disclaimer   This information is not specific medical advice and does not replace information you receive from your health care provider. This is only a brief summary of general information. It does NOT include all information about conditions, illnesses, injuries, tests, procedures, treatments, therapies, discharge instructions or life-style choices that may apply to you. You must talk with your health care provider for complete information about your health and treatment options. This information should not be used to decide whether or not to accept your health care providers advice, instructions or recommendations. Only your health care provider has the knowledge and training to provide advice that is right for  you.  Copyright   Copyright © 2021 [employer] and its affiliates and/or licensors. All rights reserved.

## 2022-02-09 NOTE — NURSING
Patient refused flu vaccine. Patient request to receive Covid booster shot. Will discuss with MD & pharmacy.

## 2022-02-09 NOTE — DISCHARGE SUMMARY
[facility] ([facility])  [facility] Medicine  Discharge Summary      Patient Name: Marcello Fernandez  MRN: [id number]  Patient Class: IP- Inpatient  Admission Date: 2/6/2022  Hospital Length of Stay: 2 days  Discharge Date and Time:  02/09/2022 1:03 PM  Attending Physician: Caro Heart MD   Discharging Provider: Mary Jo Meraz NP  Primary Care Provider: Aden Marquez MD      HPI:   41 y.o. morbidly obese male with PMHx of HTN and recent diagnosis of COPD who presented to ed yesterday with SOB x 3 days and chest tightness x 1 day. 3 days prior he tested positive covid. Since then, he has had worsening shortness of breath. Yesterday, he began having chest tightness which worsened.  His SOB worsens with exertion but his chest tightness has no association with exertion. He has been taking albuterol nebulizer treatments, albuterol inhaler and advair with no relief. His chest tightness is substernal, non-radiating, 7/10 in severity and constant. Endorses myalgias and intermittent palpitations. Denies fever, chills, lightheadedness, nausea, vomiting, diaphoresis or leg swelling.    CXR negative for infiltrate, WBC normal, tmax 99.3,no O2 requirment , procal not done   Subsequent LE US + Bilateral deep vein thrombosis.  Unable to perform CTA due to body habitus         * No surgery found *      Hospital Course:   1/8 Day 2/2 of IV heparin for Bilateral LE DVT in setting of Covid and morbid obesity  Plan to transition to XA inhibitor in AM ;   /61- 152/105, HR 80-90s, Afebrile, O2 sat 95-97% on RA, Getting dexamethasone; WBC 9.66> 15.54, afebrile   Stop dexa as he has no infiltrates ; no documented hypoxia .  Discussed with patient and wife the need for 3-6 m of anticoagulation ; He sees Dr ELSY marquez .  We also discussed obesity and need to loose weight ; he seems very motivated to loose weight     1/9 40 YO M with recent COVID 19 admitted with SOB and chest tightness   Completed 2 full days  "of IV heparin for Bilateral LE DVT in setting of Covid and morbid obesity   will transition to Eliquis 10mg bid x 7 days; he has + Bilateral DVT; Unknown PE due to unable to perform CTA due to body habitus,   /86 HR 80-90s, Afebrile, O2 sat 95-97% on RA, Getting dexamethasone; WBC 9.66> 15.54, afebrile   Stop dexa as he has no infiltrates ; no documented hypoxia .  need for 3-6 m of anticoagulation ; He sees Dr ELSY marquez .  We also discussed obesity and need to loose weight ; he seems very motivated to loose weight              Goals of Care Treatment Preferences:  Code Status: Full Code      Consults:     * Acute deep vein thrombosis (DVT) of both lower extremities  US demonstrating Bilateral DVT-   Right thigh veins: The common femoral, femoral, popliteal, upper greater saphenous, and deep femoral veins are patent and free of thrombus. The veins are normally compressible and have normal phasic flow and augmentation response.     Right calf veins: There is partial thrombosis of the right popliteal vein and thrombosis of the right peroneal vein.  The right posterior tibial vein is patent..     Left thigh veins: The common femoral is patent.  There is thrombosis of the mid and distal left femoral vein..     Left calf veins: There is thrombosis of the popliteal vein and peroneal vein.  The posterior anterior tibial veins are patent.    Possibly due to COVID 19. Reports h/o had h/o "bad blood flow to his leg"  Sedentary lifestyle in general but he does work and walk  Start IV heparin   Plan for 48 hrs of IV heparin then convert to X A inhibitor      2/8  Continue IV heparin   And tomorrow DC in am  With silvia   Price check says no co pay.    2/9 d/c home with eliquis , f/u with PCP     COVID-19  Continue O2 treatment-keep O2 sats above 90%; o2 sat 97-99% on RA   Remdesivir - does not meet criteria   Dexamethasone day 2  lovenox- IV heparin x 48 hrs   Azithromycin, Rocephin; procal not done; CXR negative for " infiltrate, WBC normal, tmax 99.3,no O2 requirement    Vit C, Vit D, multivitamin   Pepcid 20 mg daily  - COVID-19 testing   - Infection Control notified .    - Isolation:   - Airborne, Contact and Droplet Precautions  - Cohort patients into COVID units              - Limit visitors per hospital policy              - Consolidating lab draws, nursing care, provider visits, and interventions            Sciatica of right side  Takes muscle relaxer; baclofen, gabapentin 600mg tid  and hydrocodone at home for this   Continue baclofen, gabapentin, hydrocodone prn .        Tobacco dependence syndrome  States 5-6 cigars per day   Quit 1 week ago when Dr. Rabago told him he had COPD  Denies cravings .    HTN (hypertension)  Takes lisinopril 40mg daily- continue   BP elevated intially - now 136/80  .      Morbid obesity with BMI of 50.0-59.9, adult  Placing him at increased risk with COVID diagnosis. Thus far doing well and treating for DVT that is possibly COVId induced.    # The patient is asked to make an attempt to improve diet and exercise patterns to aid in medical management of this problem.     # Eat  5 small meals a day.     # Cut out high carbohydrate  foods : bread, rice, pasta, potatoes.     # Exercise/walk 5x/week for at least 30-45  minutes.              Final Active Diagnoses:    Diagnosis Date Noted POA    PRINCIPAL PROBLEM:  Acute deep vein thrombosis (DVT) of both lower extremities [I82.403] 02/07/2022 Yes    COVID-19 [U07.1] 02/07/2022 Yes    Sciatica of right side [M54.31] 06/22/2016 Yes    Tobacco dependence syndrome [F17.200] 03/03/2016 Yes    Morbid obesity with BMI of 50.0-59.9, adult [E66.01, Z68.43] 09/23/2014 Not Applicable    HTN (hypertension) [I10] 09/23/2014 Yes      Problems Resolved During this Admission:       Discharged Condition: stable    Disposition: Home or Self Care    Follow Up:   Follow-up Information     Aden Rabago MD. Go on 2/16/2022.    Specialty: Internal  Medicine  Why: Hospital Follow-up at 1:15pm  Contact information:  [address] [facility] [place] [address] [phone]                       Patient Instructions:   No discharge procedures on file.    Significant Diagnostic Studies:     Recent Labs   Lab 02/08/22  0557 02/09/22  0630   WBC 15.54* 13.73*   HGB 13.8* 13.7*   HCT 44.1 43.9    245     CMP  Sodium   Date Value Ref Range Status   02/07/2022 138 136 - 145 mmol/L Final     Potassium   Date Value Ref Range Status   02/07/2022 4.5 3.5 - 5.1 mmol/L Final     Chloride   Date Value Ref Range Status   02/07/2022 103 95 - 110 mmol/L Final     CO2   Date Value Ref Range Status   02/07/2022 24 23 - 29 mmol/L Final     Glucose   Date Value Ref Range Status   02/07/2022 177 (H) 70 - 110 mg/dL Final     BUN   Date Value Ref Range Status   02/07/2022 11 6 - 20 mg/dL Final     Creatinine   Date Value Ref Range Status   02/07/2022 1.1 0.5 - 1.4 mg/dL Final     Calcium   Date Value Ref Range Status   02/07/2022 9.6 8.7 - 10.5 mg/dL Final     Total Protein   Date Value Ref Range Status   02/06/2022 7.3 6.0 - 8.4 g/dL Final     Albumin   Date Value Ref Range Status   02/06/2022 3.6 3.5 - 5.2 g/dL Final     Total Bilirubin   Date Value Ref Range Status   02/06/2022 0.4 0.1 - 1.0 mg/dL Final     Comment:     For infants and newborns, interpretation of results should be based  on gestational age, weight and in agreement with clinical  observations.    Premature Infant recommended reference ranges:  Up to 24 hours.............<8.0 mg/dL  Up to 48 hours............<12.0 mg/dL  3-5 days..................<15.0 mg/dL  6-29 days.................<15.0 mg/dL       Alkaline Phosphatase   Date Value Ref Range Status   02/06/2022 92 55 - 135 U/L Final     AST   Date Value Ref Range Status   02/06/2022 21 10 - 40 U/L Final     ALT   Date Value Ref Range Status   02/06/2022 47 (H) 10 - 44 U/L Final     Anion Gap   Date Value Ref Range Status   02/07/2022 11 8 - 16 mmol/L Final      eGFR if    Date Value Ref Range Status   02/07/2022 >60 >60 mL/min/1.73 m^2 Final     eGFR if non    Date Value Ref Range Status   02/07/2022 >60 >60 mL/min/1.73 m^2 Final     Comment:     Calculation used to obtain the estimated glomerular filtration  rate (eGFR) is the CKD-EPI equation.        Radiology; CXR_ Examination is limited by body habitus.  The cardiac silhouette is within normal limits.   There is no focal consolidation, pneumothorax, or pleural effusion    LE US- Bilateral deep vein thrombosis.    Pending Diagnostic Studies:     None         Medications:  Reconciled Home Medications:      Medication List      START taking these medications    albuterol 90 mcg/actuation inhaler  Commonly known as: PROVENTIL/VENTOLIN HFA  Inhale 8 puffs into the lungs every 6 (six) hours as needed for Wheezing. Rescue     apixaban 5 mg Tab  Commonly known as: ELIQUIS  Take 2 tablets( 10mg ) twice daily x 7days; then decrease to 1 tablet (5mg) twice daily     ascorbic acid (vitamin C) 500 MG tablet  Commonly known as: VITAMIN C  Take 1 tablet (500 mg total) by mouth 2 (two) times daily.        CHANGE how you take these medications    HYDROcodone-acetaminophen  mg per tablet  Commonly known as: NORCO  Take 1 tablet by mouth every 6 (six) hours as needed for Pain.  What changed: Another medication with the same name was removed. Continue taking this medication, and follow the directions you see here.        CONTINUE taking these medications    baclofen 20 MG tablet  Commonly known as: LIORESAL  Take 2 tablets (40 mg total) by mouth 2 (two) times daily.     gabapentin 300 MG capsule  Commonly known as: NEURONTIN  Take 600 mg by mouth 3 (three) times daily.     lisinopriL 40 MG tablet  Commonly known as: PRINIVIL,ZESTRIL  Take 1 tablet (40 mg total) by mouth once daily.     loratadine 10 mg tablet  Commonly known as: CLARITIN  Take 10 mg by mouth once daily.     mirabegron 25 mg Tb24  ER tablet  Commonly known as: MYRBETRIQ  Take 25 mg by mouth once daily.     omeprazole 20 MG capsule  Commonly known as: PRILOSEC  Take 2 capsules (40 mg total) by mouth once daily.     tiZANidine 4 MG tablet  Commonly known as: ZANAFLEX  Take 4 mg by mouth every 6 (six) hours as needed.        STOP taking these medications    diclofenac 75 MG EC tablet  Commonly known as: VOLTAREN     ibuprofen 600 MG tablet  Commonly known as: ADVIL,MOTRIN     ibuprofen 800 MG tablet  Commonly known as: ADVIL,MOTRIN            Indwelling Lines/Drains at time of discharge:   Lines/Drains/Airways     None                 Time spent on the discharge of patient: 20  minutes         Mary Jo Meraz NP  Department of Hospital Medicine  [facility] (3rd Fl)

## 2022-02-09 NOTE — ASSESSMENT & PLAN NOTE
"US demonstrating Bilateral DVT-   Right thigh veins: The common femoral, femoral, popliteal, upper greater saphenous, and deep femoral veins are patent and free of thrombus. The veins are normally compressible and have normal phasic flow and augmentation response.     Right calf veins: There is partial thrombosis of the right popliteal vein and thrombosis of the right peroneal vein.  The right posterior tibial vein is patent..     Left thigh veins: The common femoral is patent.  There is thrombosis of the mid and distal left femoral vein..     Left calf veins: There is thrombosis of the popliteal vein and peroneal vein.  The posterior anterior tibial veins are patent.    Possibly due to COVID 19. Reports h/o had h/o "bad blood flow to his leg"  Sedentary lifestyle in general but he does work and walk  Start IV heparin   Plan for 48 hrs of IV heparin then convert to X A inhibitor      2/8  Continue IV heparin   And tomorrow DC in am  With eliquis   Price check says no co pay.    2/9 d/c home with eliquis , f/u with PCP   "

## 2022-02-10 NOTE — PLAN OF CARE
[facility] - Med Surg (3rd Fl)  Discharge Final Note    Primary Care Provider: Aden Rabago MD    Expected Discharge Date: 2/9/2022    Final Discharge Note (most recent)     Final Note - 02/10/22 1411        Final Note    Assessment Type Final Discharge Note     Anticipated Discharge Disposition Home or Self Care     What phone number can be called within the next 1-3 days to see how you are doing after discharge? [phone]     Hospital Resources/Appts/Education Provided Appointments scheduled and added to AVS        Post-Acute Status    Post-Acute Authorization Other     Other Status No Post-Acute Service Needs     Discharge Delays None known at this time                 Important Message from Medicare             Contact Info     Aden Rabago MD   Specialty: Internal Medicine   Relationship: PCP - General    [address] [place] [id number]   Phone: [phone]       Next Steps: Go on 2/16/2022    Instructions: Hospital Follow-up at 1:15pm

## 2022-02-13 ENCOUNTER — HOSPITAL ENCOUNTER (EMERGENCY)
Facility: HOSPITAL | Age: 42
Discharge: ANOTHER HOSPITAL | End: 2022-02-13
Attending: STUDENT IN AN ORGANIZED HEALTH CARE EDUCATION/TRAINING PROGRAM
Payer: MEDICAID

## 2022-02-13 ENCOUNTER — HOSPITAL ENCOUNTER (INPATIENT)
Facility: HOSPITAL | Age: 42
LOS: 5 days | Discharge: HOME | DRG: 177 | End: 2022-02-18
Attending: EMERGENCY MEDICINE | Admitting: EMERGENCY MEDICINE
Payer: MEDICAID

## 2022-02-13 VITALS
RESPIRATION RATE: 23 BRPM | DIASTOLIC BLOOD PRESSURE: 62 MMHG | BODY MASS INDEX: 73.07 KG/M2 | SYSTOLIC BLOOD PRESSURE: 155 MMHG | OXYGEN SATURATION: 99 % | TEMPERATURE: 99 F | WEIGHT: 315 LBS | HEART RATE: 108 BPM

## 2022-02-13 DIAGNOSIS — R00.0 TACHYCARDIA: ICD-10-CM

## 2022-02-13 DIAGNOSIS — I82.431 ACUTE DEEP VEIN THROMBOSIS (DVT) OF POPLITEAL VEIN OF RIGHT LOWER EXTREMITY: ICD-10-CM

## 2022-02-13 DIAGNOSIS — J12.82 PNEUMONIA DUE TO COVID-19 VIRUS: Primary | ICD-10-CM

## 2022-02-13 DIAGNOSIS — J96.01 ACUTE HYPOXEMIC RESPIRATORY FAILURE: ICD-10-CM

## 2022-02-13 DIAGNOSIS — J44.9 CHRONIC OBSTRUCTIVE PULMONARY DISEASE, UNSPECIFIED COPD TYPE: ICD-10-CM

## 2022-02-13 DIAGNOSIS — R79.89 ELEVATED D-DIMER: Primary | ICD-10-CM

## 2022-02-13 DIAGNOSIS — U07.1 COVID-19 VIRUS DETECTED: ICD-10-CM

## 2022-02-13 DIAGNOSIS — R06.02 SHORTNESS OF BREATH: ICD-10-CM

## 2022-02-13 DIAGNOSIS — U07.1 COVID-19: ICD-10-CM

## 2022-02-13 DIAGNOSIS — U07.1 PNEUMONIA DUE TO COVID-19 VIRUS: Primary | ICD-10-CM

## 2022-02-13 DIAGNOSIS — I82.433 ACUTE DEEP VEIN THROMBOSIS (DVT) OF POPLITEAL VEIN OF BOTH LOWER EXTREMITIES: ICD-10-CM

## 2022-02-13 DIAGNOSIS — I24.89 DEMAND ISCHEMIA: ICD-10-CM

## 2022-02-13 PROBLEM — S39.012A STRAIN OF LUMBAR PARASPINAL MUSCLE, INITIAL ENCOUNTER: Status: RESOLVED | Noted: 2021-11-15 | Resolved: 2022-02-13

## 2022-02-13 LAB
ALBUMIN SERPL BCP-MCNC: 3.7 G/DL (ref 3.5–5.2)
ALP SERPL-CCNC: 87 U/L (ref 55–135)
ALT SERPL W/O P-5'-P-CCNC: 54 U/L (ref 10–44)
ANION GAP SERPL CALC-SCNC: 9 MMOL/L (ref 8–16)
APTT BLDCRRT: 33.6 SEC (ref 21–32)
AST SERPL-CCNC: 27 U/L (ref 10–40)
BASOPHILS # BLD AUTO: 0.03 K/UL (ref 0–0.2)
BASOPHILS NFR BLD: 0.5 % (ref 0–1.9)
BILIRUB SERPL-MCNC: 0.6 MG/DL (ref 0.1–1)
BNP SERPL-MCNC: 39 PG/ML (ref 0–99)
BUN SERPL-MCNC: 10 MG/DL (ref 6–20)
CALCIUM SERPL-MCNC: 9.5 MG/DL (ref 8.7–10.5)
CHLORIDE SERPL-SCNC: 101 MMOL/L (ref 95–110)
CO2 SERPL-SCNC: 28 MMOL/L (ref 23–29)
CREAT SERPL-MCNC: 1.3 MG/DL (ref 0.5–1.4)
D DIMER PPP IA.FEU-MCNC: 0.77 MG/L FEU
DIFFERENTIAL METHOD: ABNORMAL
EOSINOPHIL # BLD AUTO: 0 K/UL (ref 0–0.5)
EOSINOPHIL NFR BLD: 0.5 % (ref 0–8)
ERYTHROCYTE [DISTWIDTH] IN BLOOD BY AUTOMATED COUNT: 13.8 % (ref 11.5–14.5)
EST. GFR  (AFRICAN AMERICAN): >60 ML/MIN/1.73 M^2
EST. GFR  (NON AFRICAN AMERICAN): >60 ML/MIN/1.73 M^2
GLUCOSE SERPL-MCNC: 155 MG/DL (ref 70–110)
HCT VFR BLD AUTO: 44.4 % (ref 40–54)
HGB BLD-MCNC: 14 G/DL (ref 14–18)
IMM GRANULOCYTES # BLD AUTO: 0.04 K/UL (ref 0–0.04)
IMM GRANULOCYTES NFR BLD AUTO: 0.6 % (ref 0–0.5)
LACTATE SERPL-SCNC: 1.3 MMOL/L (ref 0.5–2.2)
LYMPHOCYTES # BLD AUTO: 1.4 K/UL (ref 1–4.8)
LYMPHOCYTES NFR BLD: 20.6 % (ref 18–48)
MCH RBC QN AUTO: 28.3 PG (ref 27–31)
MCHC RBC AUTO-ENTMCNC: 31.5 G/DL (ref 32–36)
MCV RBC AUTO: 90 FL (ref 82–98)
MONOCYTES # BLD AUTO: 0.6 K/UL (ref 0.3–1)
MONOCYTES NFR BLD: 8.3 % (ref 4–15)
NEUTROPHILS # BLD AUTO: 4.6 K/UL (ref 1.8–7.7)
NEUTROPHILS NFR BLD: 69.5 % (ref 38–73)
NRBC BLD-RTO: 0 /100 WBC
PLATELET # BLD AUTO: 185 K/UL (ref 150–450)
PMV BLD AUTO: 10.6 FL (ref 9.2–12.9)
POTASSIUM SERPL-SCNC: 4.3 MMOL/L (ref 3.5–5.1)
PROCALCITONIN SERPL IA-MCNC: 0.08 NG/ML
PROT SERPL-MCNC: 7.8 G/DL (ref 6–8.4)
RBC # BLD AUTO: 4.95 M/UL (ref 4.6–6.2)
SARS-COV-2 RDRP RESP QL NAA+PROBE: POSITIVE
SODIUM SERPL-SCNC: 138 MMOL/L (ref 136–145)
TROPONIN I SERPL DL<=0.01 NG/ML-MCNC: 0.02 NG/ML (ref 0–0.03)
TROPONIN I SERPL DL<=0.01 NG/ML-MCNC: 0.03 NG/ML (ref 0–0.03)
WBC # BLD AUTO: 6.6 K/UL (ref 3.9–12.7)

## 2022-02-13 PROCEDURE — 99222 PR INITIAL HOSPITAL CARE,LEVL II: ICD-10-PCS | Mod: ,,, | Performed by: HOSPITALIST

## 2022-02-13 PROCEDURE — 83605 ASSAY OF LACTIC ACID: CPT | Performed by: NURSE PRACTITIONER

## 2022-02-13 PROCEDURE — 87040 BLOOD CULTURE FOR BACTERIA: CPT | Mod: 59 | Performed by: NURSE PRACTITIONER

## 2022-02-13 PROCEDURE — 99285 EMERGENCY DEPT VISIT HI MDM: CPT | Mod: 25,27

## 2022-02-13 PROCEDURE — U0002 COVID-19 LAB TEST NON-CDC: HCPCS | Performed by: NURSE PRACTITIONER

## 2022-02-13 PROCEDURE — 85379 FIBRIN DEGRADATION QUANT: CPT | Performed by: NURSE PRACTITIONER

## 2022-02-13 PROCEDURE — 93010 EKG 12-LEAD: ICD-10-PCS | Mod: ,,, | Performed by: INTERNAL MEDICINE

## 2022-02-13 PROCEDURE — 84484 ASSAY OF TROPONIN QUANT: CPT | Mod: 91 | Performed by: PHYSICIAN ASSISTANT

## 2022-02-13 PROCEDURE — 94640 AIRWAY INHALATION TREATMENT: CPT

## 2022-02-13 PROCEDURE — 85730 THROMBOPLASTIN TIME PARTIAL: CPT | Performed by: NURSE PRACTITIONER

## 2022-02-13 PROCEDURE — 85025 COMPLETE CBC W/AUTO DIFF WBC: CPT | Performed by: NURSE PRACTITIONER

## 2022-02-13 PROCEDURE — 80053 COMPREHEN METABOLIC PANEL: CPT | Performed by: NURSE PRACTITIONER

## 2022-02-13 PROCEDURE — 99222 1ST HOSP IP/OBS MODERATE 55: CPT | Mod: ,,, | Performed by: HOSPITALIST

## 2022-02-13 PROCEDURE — 99285 EMERGENCY DEPT VISIT HI MDM: CPT | Mod: 25

## 2022-02-13 PROCEDURE — 99900035 HC TECH TIME PER 15 MIN (STAT)

## 2022-02-13 PROCEDURE — 93005 ELECTROCARDIOGRAM TRACING: CPT

## 2022-02-13 PROCEDURE — 12000002 HC ACUTE/MED SURGE SEMI-PRIVATE ROOM

## 2022-02-13 PROCEDURE — 25000242 PHARM REV CODE 250 ALT 637 W/ HCPCS: Performed by: NURSE PRACTITIONER

## 2022-02-13 PROCEDURE — 93010 ELECTROCARDIOGRAM REPORT: CPT | Mod: ,,, | Performed by: INTERNAL MEDICINE

## 2022-02-13 PROCEDURE — 99284 EMERGENCY DEPT VISIT MOD MDM: CPT | Mod: ,,, | Performed by: EMERGENCY MEDICINE

## 2022-02-13 PROCEDURE — 84484 ASSAY OF TROPONIN QUANT: CPT | Performed by: NURSE PRACTITIONER

## 2022-02-13 PROCEDURE — 96374 THER/PROPH/DIAG INJ IV PUSH: CPT

## 2022-02-13 PROCEDURE — 63600175 PHARM REV CODE 636 W HCPCS: Performed by: NURSE PRACTITIONER

## 2022-02-13 PROCEDURE — 84145 PROCALCITONIN (PCT): CPT | Performed by: NURSE PRACTITIONER

## 2022-02-13 PROCEDURE — 94760 N-INVAS EAR/PLS OXIMETRY 1: CPT

## 2022-02-13 PROCEDURE — 99284 PR EMERGENCY DEPT VISIT,LEVEL IV: ICD-10-PCS | Mod: ,,, | Performed by: EMERGENCY MEDICINE

## 2022-02-13 PROCEDURE — 83880 ASSAY OF NATRIURETIC PEPTIDE: CPT | Performed by: NURSE PRACTITIONER

## 2022-02-13 PROCEDURE — 27000207 HC ISOLATION

## 2022-02-13 PROCEDURE — 25500020 PHARM REV CODE 255: Performed by: EMERGENCY MEDICINE

## 2022-02-13 RX ORDER — IBUPROFEN 200 MG
16 TABLET ORAL
Status: DISCONTINUED | OUTPATIENT
Start: 2022-02-14 | End: 2022-02-18 | Stop reason: HOSPADM

## 2022-02-13 RX ORDER — FLUTICASONE PROPIONATE 50 MCG
1 SPRAY, SUSPENSION (ML) NASAL DAILY
Status: DISCONTINUED | OUTPATIENT
Start: 2022-02-14 | End: 2022-02-18 | Stop reason: HOSPADM

## 2022-02-13 RX ORDER — MELOXICAM 15 MG/1
15 TABLET ORAL DAILY
Status: DISCONTINUED | OUTPATIENT
Start: 2022-02-14 | End: 2022-02-18 | Stop reason: HOSPADM

## 2022-02-13 RX ORDER — AMOXICILLIN 250 MG
1 CAPSULE ORAL 2 TIMES DAILY PRN
Status: DISCONTINUED | OUTPATIENT
Start: 2022-02-14 | End: 2022-02-18 | Stop reason: HOSPADM

## 2022-02-13 RX ORDER — FLUTICASONE FUROATE AND VILANTEROL 100; 25 UG/1; UG/1
1 POWDER RESPIRATORY (INHALATION) DAILY
Status: DISCONTINUED | OUTPATIENT
Start: 2022-02-14 | End: 2022-02-16

## 2022-02-13 RX ORDER — TESTOSTERONE CYPIONATE 200 MG/ML
200 INJECTION, SOLUTION INTRAMUSCULAR
COMMUNITY
Start: 2022-01-03

## 2022-02-13 RX ORDER — MONTELUKAST SODIUM 10 MG/1
10 TABLET ORAL NIGHTLY
COMMUNITY
Start: 2022-01-19 | End: 2023-05-19

## 2022-02-13 RX ORDER — ACETAMINOPHEN 325 MG/1
650 TABLET ORAL EVERY 6 HOURS PRN
Status: DISCONTINUED | OUTPATIENT
Start: 2022-02-14 | End: 2022-02-18 | Stop reason: HOSPADM

## 2022-02-13 RX ORDER — FLUTICASONE PROPIONATE 50 MCG
1 SPRAY, SUSPENSION (ML) NASAL DAILY
COMMUNITY
Start: 2022-01-19

## 2022-02-13 RX ORDER — IPRATROPIUM BROMIDE AND ALBUTEROL SULFATE 2.5; .5 MG/3ML; MG/3ML
3 SOLUTION RESPIRATORY (INHALATION)
Status: COMPLETED | OUTPATIENT
Start: 2022-02-13 | End: 2022-02-13

## 2022-02-13 RX ORDER — LOPERAMIDE HYDROCHLORIDE 2 MG/1
2 CAPSULE ORAL EVERY 6 HOURS PRN
Status: DISCONTINUED | OUTPATIENT
Start: 2022-02-14 | End: 2022-02-18 | Stop reason: HOSPADM

## 2022-02-13 RX ORDER — PANTOPRAZOLE SODIUM 40 MG/1
40 TABLET, DELAYED RELEASE ORAL DAILY
Status: DISCONTINUED | OUTPATIENT
Start: 2022-02-14 | End: 2022-02-18 | Stop reason: HOSPADM

## 2022-02-13 RX ORDER — PROCHLORPERAZINE EDISYLATE 5 MG/ML
5 INJECTION INTRAMUSCULAR; INTRAVENOUS EVERY 6 HOURS PRN
Status: DISCONTINUED | OUTPATIENT
Start: 2022-02-14 | End: 2022-02-18 | Stop reason: HOSPADM

## 2022-02-13 RX ORDER — PANTOPRAZOLE SODIUM 40 MG/1
40 TABLET, DELAYED RELEASE ORAL DAILY
COMMUNITY
Start: 2022-01-03

## 2022-02-13 RX ORDER — IPRATROPIUM BROMIDE AND ALBUTEROL SULFATE 2.5; .5 MG/3ML; MG/3ML
3 SOLUTION RESPIRATORY (INHALATION) EVERY 6 HOURS PRN
COMMUNITY
Start: 2022-01-19 | End: 2022-12-21 | Stop reason: SDUPTHER

## 2022-02-13 RX ORDER — GABAPENTIN 300 MG/1
600 CAPSULE ORAL 3 TIMES DAILY
Status: DISCONTINUED | OUTPATIENT
Start: 2022-02-14 | End: 2022-02-18 | Stop reason: HOSPADM

## 2022-02-13 RX ORDER — GLUCAGON 1 MG
1 KIT INJECTION
Status: DISCONTINUED | OUTPATIENT
Start: 2022-02-14 | End: 2022-02-18 | Stop reason: HOSPADM

## 2022-02-13 RX ORDER — ALBUTEROL SULFATE 90 UG/1
2 AEROSOL, METERED RESPIRATORY (INHALATION) EVERY 6 HOURS
Status: DISCONTINUED | OUTPATIENT
Start: 2022-02-14 | End: 2022-02-18

## 2022-02-13 RX ORDER — METHYLPREDNISOLONE SOD SUCC 125 MG
125 VIAL (EA) INJECTION
Status: COMPLETED | OUTPATIENT
Start: 2022-02-13 | End: 2022-02-13

## 2022-02-13 RX ORDER — SODIUM CHLORIDE 0.9 % (FLUSH) 0.9 %
10 SYRINGE (ML) INJECTION
Status: DISCONTINUED | OUTPATIENT
Start: 2022-02-14 | End: 2022-02-18 | Stop reason: HOSPADM

## 2022-02-13 RX ORDER — MELOXICAM 15 MG/1
15 TABLET ORAL DAILY
COMMUNITY
Start: 2022-01-03 | End: 2023-02-28

## 2022-02-13 RX ORDER — BENZONATATE 100 MG/1
100 CAPSULE ORAL 3 TIMES DAILY PRN
Status: DISCONTINUED | OUTPATIENT
Start: 2022-02-14 | End: 2022-02-15

## 2022-02-13 RX ORDER — HYDROCODONE BITARTRATE AND ACETAMINOPHEN 10; 325 MG/1; MG/1
1 TABLET ORAL EVERY 6 HOURS PRN
Status: DISCONTINUED | OUTPATIENT
Start: 2022-02-14 | End: 2022-02-18 | Stop reason: HOSPADM

## 2022-02-13 RX ORDER — FLUTICASONE PROPIONATE AND SALMETEROL 50; 250 UG/1; UG/1
1 POWDER RESPIRATORY (INHALATION) 2 TIMES DAILY
COMMUNITY
Start: 2022-01-21

## 2022-02-13 RX ORDER — IBUPROFEN 200 MG
24 TABLET ORAL
Status: DISCONTINUED | OUTPATIENT
Start: 2022-02-14 | End: 2022-02-18 | Stop reason: HOSPADM

## 2022-02-13 RX ORDER — TALC
6 POWDER (GRAM) TOPICAL NIGHTLY PRN
Status: DISCONTINUED | OUTPATIENT
Start: 2022-02-14 | End: 2022-02-18 | Stop reason: HOSPADM

## 2022-02-13 RX ORDER — TRAZODONE HYDROCHLORIDE 100 MG/1
100 TABLET ORAL NIGHTLY
Status: DISCONTINUED | OUTPATIENT
Start: 2022-02-13 | End: 2022-02-18 | Stop reason: HOSPADM

## 2022-02-13 RX ORDER — MONTELUKAST SODIUM 10 MG/1
10 TABLET ORAL NIGHTLY
Status: DISCONTINUED | OUTPATIENT
Start: 2022-02-14 | End: 2022-02-18 | Stop reason: HOSPADM

## 2022-02-13 RX ORDER — ASCORBIC ACID 500 MG
500 TABLET ORAL 2 TIMES DAILY
Status: DISCONTINUED | OUTPATIENT
Start: 2022-02-14 | End: 2022-02-18 | Stop reason: HOSPADM

## 2022-02-13 RX ORDER — TRAZODONE HYDROCHLORIDE 100 MG/1
100 TABLET ORAL NIGHTLY
COMMUNITY
Start: 2021-09-22 | End: 2023-05-19

## 2022-02-13 RX ORDER — BACLOFEN 10 MG/1
40 TABLET ORAL 2 TIMES DAILY
Status: DISCONTINUED | OUTPATIENT
Start: 2022-02-14 | End: 2022-02-18 | Stop reason: HOSPADM

## 2022-02-13 RX ADMIN — IPRATROPIUM BROMIDE AND ALBUTEROL SULFATE 3 ML: 2.5; .5 SOLUTION RESPIRATORY (INHALATION) at 04:02

## 2022-02-13 RX ADMIN — IOHEXOL 100 ML: 350 INJECTION, SOLUTION INTRAVENOUS at 09:02

## 2022-02-13 RX ADMIN — METHYLPREDNISOLONE SODIUM SUCCINATE 125 MG: 125 INJECTION, POWDER, FOR SOLUTION INTRAMUSCULAR; INTRAVENOUS at 04:02

## 2022-02-13 NOTE — ED PROVIDER NOTES
Encounter Date: 2/13/2022       History     Chief Complaint   Patient presents with    Chest Congestion     Patient presents POV with C/O chest congestion and he states yesterday he began coughing up bloody sputum, and he was diagnosed with COVID and a PE 1 week ago and COPD 3 weeks ago     Laterrance Julio Fernandez is a 41 y.o. morbidly obese male who presents to the ED for evaluation of chest congestion and shortness of breath.  Patient reports that he was diagnosed with COVID-19 viral infection on 02/03/22 at a local [employer]. Prior to this, he was dx with COPD. He presented to the ED on 02/06/22 for sob and chest tightness where he was noted to have DVTs in bilateral lower extremities> partial thrombosis of the right popliteal vein and thrombosis of the right peroneal vein.  The right posterior tibial vein is patent.Left calf veins: There is thrombosis of the popliteal vein and peroneal vein.  The posterior anterior tibial veins are patent..  Left thigh veins: The common femoral is patent.  There is thrombosis of the mid and distal left femoral vein..  He was hospitalized on 02/07/22 where he received two full days of Heparin with switch to eliquis and dc on 02/09/22. He is currently taking 10mg BID and reports compliance with medication, but noted increased SOB both at rest and with exertion. He also became concerned when he started coughing up bloody sputum this AM. He denies cp. Denies palpitations or feelings of syncope.   He has been using inhalers at home including albuterol and Advair with mild relief. He does not endorse fever.     The history is provided by the patient.     Review of patient's allergies indicates:  No Known Allergies  Past Medical History:   Diagnosis Date    COPD (chronic obstructive pulmonary disease)     Hypertension     Pulmonary embolism 2022     Past Surgical History:   Procedure Laterality Date    ANKLE SURGERY Left      Family History   Problem Relation Age of  Onset    Diabetes Mellitus Mother     Diabetes Mellitus Father     Hypertension Father     Stroke Paternal Grandfather      Social History     Tobacco Use    Smoking status: Current Every Day Smoker     Years: 19.00     Types: Cigars     Start date: 3/4/1996    Smokeless tobacco: Never Used    Tobacco comment: pt stated smokes 5 cigars/day   Substance Use Topics    Alcohol use: Yes     Alcohol/week: 0.0 standard drinks     Comment: occasional    Drug use: No     Review of Systems   Constitutional: Negative for appetite change, chills and fever.   HENT: Negative for congestion, ear discharge, ear pain, postnasal drip, rhinorrhea and sore throat.    Respiratory: Positive for cough (productive of blood-tinged sputum ) and shortness of breath. Negative for chest tightness.    Cardiovascular: Negative for chest pain and palpitations.   Gastrointestinal: Negative for abdominal distention, abdominal pain and nausea.   Genitourinary: Negative for dysuria, flank pain, hematuria and urgency.   Musculoskeletal: Negative for arthralgias and back pain.   Skin: Negative for rash.   Neurological: Negative for dizziness, weakness, numbness and headaches.   Hematological: Does not bruise/bleed easily.       Physical Exam     Initial Vitals [02/13/22 1305]   BP Pulse Resp Temp SpO2   (!) 169/91 104 20 99.2 °F (37.3 °C) 97 %      MAP       --         Physical Exam    Nursing note and vitals reviewed.  Constitutional: He appears well-developed and well-nourished.   HENT:   Head: Normocephalic and atraumatic.   Nose: Nose normal.   Eyes: Conjunctivae and EOM are normal. Pupils are equal, round, and reactive to light.   Neck: Neck supple.   Cardiovascular: Regular rhythm, normal heart sounds and intact distal pulses.  No extrasystoles are present.  Tachycardia present.  Exam reveals decreased pulses.    Pulmonary/Chest: He has decreased breath sounds. He has wheezes (expiratory wheeze) in the right upper field and the left upper  field. He has no rhonchi. He has no rales.   Abdominal: Abdomen is soft. Bowel sounds are normal.   Musculoskeletal:         General: Normal range of motion.      Cervical back: Neck supple.     Neurological: He is alert and oriented to person, place, and time. He has normal strength.   Skin: Skin is warm and dry.   Psychiatric: He has a normal mood and affect. His behavior is normal. Judgment and thought content normal.         ED Course   Procedures  Labs Reviewed   CBC W/ AUTO DIFFERENTIAL - Abnormal; Notable for the following components:       Result Value    MCHC 31.5 (*)     Immature Granulocytes 0.6 (*)     All other components within normal limits   COMPREHENSIVE METABOLIC PANEL - Abnormal; Notable for the following components:    Glucose 155 (*)     ALT 54 (*)     All other components within normal limits   APTT - Abnormal; Notable for the following components:    aPTT 33.6 (*)     All other components within normal limits   D DIMER, QUANTITATIVE - Abnormal; Notable for the following components:    D-Dimer 0.77 (*)     All other components within normal limits   SARS-COV-2 RNA AMPLIFICATION, QUAL - Abnormal; Notable for the following components:    SARS-CoV-2 RNA, Amplification, Qual Positive (*)     All other components within normal limits   CULTURE, BLOOD   CULTURE, BLOOD   TROPONIN I   B-TYPE NATRIURETIC PEPTIDE   LACTIC ACID, PLASMA   PROCALCITONIN          Imaging Results          US Lower Extremity Veins Bilateral (Final result)  Result time 02/13/22 15:35:21    Final result by Pam Downing MD (02/13/22 15:35:21)                 Impression:      Limited study.  No new DVT is identified.  Nonocclusive DVT in the right popliteal vein is similar to the previous exam.  DVT previously described in the left thigh is not identified currently.      Electronically signed by: Pam Downing  Date:    02/13/2022  Time:    15:35             Narrative:    EXAMINATION:  US LOWER EXTREMITY VEINS  BILATERAL    CLINICAL HISTORY:  Other specified abnormal findings of blood chemistry history of DVT    TECHNIQUE:  Duplex and color flow Doppler and dynamic compression was performed of the bilateral lower extremity veins was performed.  Study limited by body habitus    COMPARISON:  02/07/2022    FINDINGS:  Right thigh veins: The common femoral, femoral,  upper greater saphenous, and deep femoral veins are patent and free of thrombus. The veins are normally compressible and have normal phasic flow and augmentation response.  The right popliteal vein is incompressible and there is nonocclusive DVT within it similar to the previous study    Right calf veins: The visualized calf veins are patent.  There is relatively poor visualization of the peroneal vein and it is difficult to assess whether or not the previously noted DVT has resolved.    Left thigh veins: The common femoral, femoral, popliteal, upper greater saphenous, and deep femoral veins are patent and free of thrombus. The veins are normally compressible and have normal phasic flow and augmentation response.  The DVT noted previously in the femoral, popliteal veins is not clearly identified currently.    Left calf veins: The visualized calf veins are patent.  Poor visualization of peroneal vein, difficult to assess for residual DVT.    Miscellaneous: None                               X-Ray Chest AP Portable (Final result)  Result time 02/13/22 14:35:44    Final result by Pam Downing MD (02/13/22 14:35:44)                 Impression:      Limited study, grossly negative.      Electronically signed by: Pam Downing  Date:    02/13/2022  Time:    14:35             Narrative:    EXAMINATION:  XR CHEST AP PORTABLE    CLINICAL HISTORY:  Shortness of breath    TECHNIQUE:  Single frontal view of the chest was performed.    COMPARISON:  02/06/2022    FINDINGS:  A portable view of the chest was obtained and is significantly limited by body habitus and  portable technique.  The cardiomediastinal silhouette is stable.  The lungs are clear.  There is no pleural effusion.  There are several external artifacts.                                 Medications   albuterol-ipratropium 2.5 mg-0.5 mg/3 mL nebulizer solution 3 mL (3 mLs Nebulization Given 2/13/22 1607)   methylPREDNISolone sodium succinate injection 125 mg (125 mg Intravenous Given 2/13/22 1634)     Medical Decision Making:   Differential Diagnosis:   PE, COPD exacerbation, pneumonia, bronchitis  Independently Interpreted Test(s):   I have ordered and independently interpreted EKG Reading(s) - see summary below       <> Summary of EKG Reading(s): Normal sinus rhythm, rate 96. Normal DC. No STEMI.   Nonspecific T wave abnormality in Lateral leads when compared to EKG 02/06/22  Clinical Tests:   Lab Tests: Ordered and Reviewed  Radiological Study: Ordered and Reviewed  ED Management:  Evaluation of a 41-year-old male with shortness of breath, bloody sputum.  Recent history a bilateral lower extremity DVTs with history of COVID-19 viral infection.  Currently taking Eliquis 10 mg b.i.d..  Presents tachypneic and tachycardic with an oxygen saturation of 95% on room air.  He has diminished breath sounds on exam with mild, expiratory wheezing and history of COPD.  He was given duoneb tx and 125mg Solumedrol IV for likely COPD exacerbation.   Lab workup revealed a D-dimer of 0.77, which is down from previous 2.35 on 02/06/22.  DVT ultrasound shows residual, nonocclusive right popliteal DVT.  Given patient history, with increased shortness of breath and elevated D-dimer, patient needs CT PE study. D/t body habitus, unable to perform CT PE study at [facility] [place].   Of note, patient had a negative rapid covid test and PCR test during previous admission on 02/06. He has a + covid test today.   He will be transferred to [facility] for CT PE study.                       Clinical Impression:   Final diagnoses:  [R06.02] Shortness of  breath  [R79.89] Elevated d-dimer (Primary)  [J44.9] Chronic obstructive pulmonary disease, unspecified COPD type  [U07.1] COVID-19  [I82.431] Acute deep vein thrombosis (DVT) of popliteal vein of right lower extremity          ED Disposition Condition    Transfer to Another Facility Stable              Mariaelena Harvey NP  02/13/22 8009

## 2022-02-14 PROBLEM — J42 CHRONIC BRONCHITIS: Status: ACTIVE | Noted: 2022-02-14

## 2022-02-14 LAB
BASOPHILS # BLD AUTO: 0.01 K/UL (ref 0–0.2)
BASOPHILS NFR BLD: 0.2 % (ref 0–1.9)
BNP SERPL-MCNC: 30 PG/ML (ref 0–99)
D DIMER PPP IA.FEU-MCNC: 1.27 MG/L FEU
DIFFERENTIAL METHOD: ABNORMAL
EOSINOPHIL # BLD AUTO: 0 K/UL (ref 0–0.5)
EOSINOPHIL NFR BLD: 0 % (ref 0–8)
ERYTHROCYTE [DISTWIDTH] IN BLOOD BY AUTOMATED COUNT: 13.6 % (ref 11.5–14.5)
HCT VFR BLD AUTO: 42.2 % (ref 40–54)
HGB BLD-MCNC: 13.1 G/DL (ref 14–18)
IMM GRANULOCYTES # BLD AUTO: 0.02 K/UL (ref 0–0.04)
IMM GRANULOCYTES NFR BLD AUTO: 0.4 % (ref 0–0.5)
LYMPHOCYTES # BLD AUTO: 0.8 K/UL (ref 1–4.8)
LYMPHOCYTES NFR BLD: 14.4 % (ref 18–48)
MCH RBC QN AUTO: 27.7 PG (ref 27–31)
MCHC RBC AUTO-ENTMCNC: 31 G/DL (ref 32–36)
MCV RBC AUTO: 89 FL (ref 82–98)
MONOCYTES # BLD AUTO: 0.3 K/UL (ref 0.3–1)
MONOCYTES NFR BLD: 5.7 % (ref 4–15)
NEUTROPHILS # BLD AUTO: 4.5 K/UL (ref 1.8–7.7)
NEUTROPHILS NFR BLD: 79.3 % (ref 38–73)
NRBC BLD-RTO: 0 /100 WBC
PLATELET # BLD AUTO: 182 K/UL (ref 150–450)
PMV BLD AUTO: 11.2 FL (ref 9.2–12.9)
PROCALCITONIN SERPL IA-MCNC: 0.08 NG/ML
RBC # BLD AUTO: 4.73 M/UL (ref 4.6–6.2)
WBC # BLD AUTO: 5.61 K/UL (ref 3.9–12.7)

## 2022-02-14 PROCEDURE — 25000242 PHARM REV CODE 250 ALT 637 W/ HCPCS: Performed by: HOSPITALIST

## 2022-02-14 PROCEDURE — 12000002 HC ACUTE/MED SURGE SEMI-PRIVATE ROOM

## 2022-02-14 PROCEDURE — 85025 COMPLETE CBC W/AUTO DIFF WBC: CPT | Performed by: HOSPITALIST

## 2022-02-14 PROCEDURE — 63600175 PHARM REV CODE 636 W HCPCS: Performed by: HOSPITALIST

## 2022-02-14 PROCEDURE — 99233 SBSQ HOSP IP/OBS HIGH 50: CPT | Mod: ,,, | Performed by: INTERNAL MEDICINE

## 2022-02-14 PROCEDURE — 36415 COLL VENOUS BLD VENIPUNCTURE: CPT | Performed by: HOSPITALIST

## 2022-02-14 PROCEDURE — 84145 PROCALCITONIN (PCT): CPT | Performed by: HOSPITALIST

## 2022-02-14 PROCEDURE — 94761 N-INVAS EAR/PLS OXIMETRY MLT: CPT

## 2022-02-14 PROCEDURE — 27100098 HC SPACER

## 2022-02-14 PROCEDURE — 94640 AIRWAY INHALATION TREATMENT: CPT

## 2022-02-14 PROCEDURE — 99233 PR SUBSEQUENT HOSPITAL CARE,LEVL III: ICD-10-PCS | Mod: ,,, | Performed by: INTERNAL MEDICINE

## 2022-02-14 PROCEDURE — 85379 FIBRIN DEGRADATION QUANT: CPT | Performed by: HOSPITALIST

## 2022-02-14 PROCEDURE — 27000207 HC ISOLATION

## 2022-02-14 PROCEDURE — 25000003 PHARM REV CODE 250: Performed by: HOSPITALIST

## 2022-02-14 PROCEDURE — 83880 ASSAY OF NATRIURETIC PEPTIDE: CPT | Performed by: HOSPITALIST

## 2022-02-14 RX ADMIN — MONTELUKAST 10 MG: 10 TABLET, FILM COATED ORAL at 09:02

## 2022-02-14 RX ADMIN — ALBUTEROL SULFATE 2 PUFF: 108 INHALANT RESPIRATORY (INHALATION) at 09:02

## 2022-02-14 RX ADMIN — BACLOFEN 40 MG: 10 TABLET ORAL at 08:02

## 2022-02-14 RX ADMIN — BENZONATATE 100 MG: 100 CAPSULE ORAL at 09:02

## 2022-02-14 RX ADMIN — APIXABAN 10 MG: 5 TABLET, FILM COATED ORAL at 08:02

## 2022-02-14 RX ADMIN — ALBUTEROL SULFATE 2 PUFF: 108 INHALANT RESPIRATORY (INHALATION) at 02:02

## 2022-02-14 RX ADMIN — GABAPENTIN 600 MG: 300 CAPSULE ORAL at 09:02

## 2022-02-14 RX ADMIN — GABAPENTIN 600 MG: 300 CAPSULE ORAL at 04:02

## 2022-02-14 RX ADMIN — OXYCODONE HYDROCHLORIDE AND ACETAMINOPHEN 500 MG: 500 TABLET ORAL at 08:02

## 2022-02-14 RX ADMIN — OXYCODONE HYDROCHLORIDE AND ACETAMINOPHEN 500 MG: 500 TABLET ORAL at 09:02

## 2022-02-14 RX ADMIN — HYDROCODONE BITARTRATE AND ACETAMINOPHEN 1 TABLET: 10; 325 TABLET ORAL at 09:02

## 2022-02-14 RX ADMIN — FLUTICASONE PROPIONATE 50 MCG: 50 SPRAY, METERED NASAL at 08:02

## 2022-02-14 RX ADMIN — TRAZODONE HYDROCHLORIDE 100 MG: 100 TABLET ORAL at 09:02

## 2022-02-14 RX ADMIN — FLUTICASONE FUROATE AND VILANTEROL TRIFENATATE 1 PUFF: 100; 25 POWDER RESPIRATORY (INHALATION) at 08:02

## 2022-02-14 RX ADMIN — PANTOPRAZOLE SODIUM 40 MG: 40 TABLET, DELAYED RELEASE ORAL at 08:02

## 2022-02-14 RX ADMIN — GABAPENTIN 600 MG: 300 CAPSULE ORAL at 08:02

## 2022-02-14 RX ADMIN — APIXABAN 10 MG: 5 TABLET, FILM COATED ORAL at 09:02

## 2022-02-14 RX ADMIN — MELOXICAM 15 MG: 15 TABLET ORAL at 08:02

## 2022-02-14 RX ADMIN — APIXABAN 10 MG: 5 TABLET, FILM COATED ORAL at 01:02

## 2022-02-14 RX ADMIN — BACLOFEN 40 MG: 10 TABLET ORAL at 09:02

## 2022-02-14 RX ADMIN — ALBUTEROL SULFATE 2 PUFF: 108 INHALANT RESPIRATORY (INHALATION) at 07:02

## 2022-02-14 RX ADMIN — THERA TABS 1 TABLET: TAB at 08:02

## 2022-02-14 RX ADMIN — REMDESIVIR 200 MG: 100 INJECTION, POWDER, LYOPHILIZED, FOR SOLUTION INTRAVENOUS at 01:02

## 2022-02-14 RX ADMIN — Medication 6 MG: at 09:02

## 2022-02-14 NOTE — ASSESSMENT & PLAN NOTE
Still on Eliquis initiation at 10 mg b.i.d. through Tuesday, when he will step down to 5 mg b.i.d..

## 2022-02-14 NOTE — PLAN OF CARE
Problem: Adult Inpatient Plan of Care  Goal: Optimal Comfort and Wellbeing  Outcome: Ongoing, Progressing     Problem: Bariatric Environmental Safety  Goal: Safety Maintained with Care  Outcome: Ongoing, Progressing   Pt free from pain. A/Ox4. Call light in reach. Pt ambulates independently. VSS. Bed locked and in lowest position.

## 2022-02-14 NOTE — HPI
41-year-old male with morbid obesity and COPD transferred from [facility] for a CTA due to exceeding size limits for the scanner at that facility.  He had presented there due to worsening dyspnea and cough productive of blood flecked sputum.  He tested positive for COVID-19 on 2/3 at a [facility] and due to progressive dyspnea and pain in his legs he presented to [facility] on 02/06 and was diagnosed with bilateral DVTs.  He was admitted the hospital and started on anticoagulation; initially a heparin drip which was transitioned to Eliquis prior to discharge on 02/09.  He was given dexamethasone for COVID-19 during that hospitalization despite having a normal chest x-ray and no hypoxemia.  Since leaving the hospital his exertional dyspnea has increased.  He wears CPAP at night but lately he has been taking it off at night because he feels like he is not getting enough air to breathe when wearing it.

## 2022-02-14 NOTE — ASSESSMENT & PLAN NOTE
Patient is identified as High risk for severe complications of COVID 19 based on COVID risk score of 3   Initiate standard COVID protocols; COVID-19 testing Collection Date: 2/7/2022 Collection Time:   3:48 AM ,Infection Control notification  and isolation- respiratory, contact and droplet per protocol    Diagnostics: (leukopenia, hyponatremia, hyperferritinemia, elevated troponin, elevated d-dimer, age, and comorbidities are significant predictors of poor clinical outcome)  CBC, CMP, Procalcitonin, Ferritin, CRP, BNP and Portable CXR    Management: Initiate targeted therapy with Remdesivir, 200mg IV x1, followed by 100mg IV daily x5 days total, Inhaled bronchodilators as needed for shortness of breath. and Continuous cardiac monitoring.  He does not require any supplemental oxygen so does not meet criteria for dexamethasone.  His morbid obesity likely puts him at increased risk of complications from corticosteroid use.    Advance Care Planning  Current advance care plan has not been discussed with patient/family/POA and patient currently wishes Full Code.

## 2022-02-14 NOTE — ED PROVIDER NOTES
41-year-old man Encounter Date: 2/13/2022       History     Chief Complaint   Patient presents with    Shortness of Breath     Pt is a transfer from [facility] for CTA chest. Pt c/o of SOB x1 week.      41-year-old male presents to the ED as a transfer for further evaluation of PE.  Patient has pulmonary embolism was not in charge, but patient denies history of PE.  Of note, he was recently diagnosed with bilateral DVTs.  Admitted for increased work of breathing and suspected PE given new findings of DVTs. Was unable to obtain PE study to confirm due to body habitus.  He was admitted for treatment with heparin, lovenox and was discharged on Eliquis.  Reports compliance with Eliquis. Patient reports that he had a positive COVID test on 02/03 at [facility], but tested negative during recent admission 2/6. Positive again today.  Patient has had some shortness of breath for several days which is unchanged.  He does complain of chest congestion and coughing up blood today which prompted his initial ED visit.  Outside facility was unable to obtain a CT PE study today due to patient's large body habitus so he was transferred to [facility] for imaging.  Patient denies chest pain.         Review of patient's allergies indicates:  No Known Allergies  Past Medical History:   Diagnosis Date    COPD (chronic obstructive pulmonary disease)     Hypertension     Pulmonary embolism 2022     Past Surgical History:   Procedure Laterality Date    ANKLE SURGERY Left      Family History   Problem Relation Age of Onset    Diabetes Mellitus Mother     Diabetes Mellitus Father     Hypertension Father     Stroke Paternal Grandfather      Social History     Tobacco Use    Smoking status: Current Every Day Smoker     Years: 19.00     Types: Cigars     Start date: 3/4/1996    Smokeless tobacco: Never Used    Tobacco comment: pt stated smokes 5 cigars/day   Substance Use Topics    Alcohol use: Yes     Alcohol/week: 0.0 standard drinks      Comment: occasional    Drug use: No     Review of Systems   Constitutional: Negative for fever.   HENT: Negative for sore throat.    Respiratory: Positive for cough and shortness of breath.         Chest congestion, hemoptysis   Cardiovascular: Negative for chest pain.   Gastrointestinal: Negative for nausea.   Genitourinary: Negative for dysuria.   Musculoskeletal: Negative for back pain.   Skin: Negative for rash.   Neurological: Negative for weakness.   Hematological: Does not bruise/bleed easily.       Physical Exam     Initial Vitals [02/13/22 1939]   BP Pulse Resp Temp SpO2   (!) 151/103 (!) 116 (!) 30 99.2 °F (37.3 °C) 100 %      MAP       --         Physical Exam    Nursing note and vitals reviewed.  Constitutional: He appears well-developed and well-nourished. He is Obese .  Non-toxic appearance. He does not appear ill. No distress.   Morbid obesity   HENT:   Head: Normocephalic and atraumatic.   Neck: Neck supple.   Normal range of motion.  Cardiovascular: Regular rhythm. Tachycardia present.  Exam reveals no gallop, no distant heart sounds and no friction rub.    No murmur heard.  Pulmonary/Chest: Effort normal and breath sounds normal. No accessory muscle usage. Tachypnea noted. No respiratory distress. He has no decreased breath sounds. He has no wheezes. He has no rhonchi. He has no rales.   Lung exam slightly difficult due to patient's body habitus.   Abdominal: He exhibits no distension.   Musculoskeletal:      Cervical back: Normal range of motion and neck supple.     Neurological: He is alert.   Skin: No rash noted.         ED Course   Procedures  Labs Reviewed   TROPONIN I - Abnormal; Notable for the following components:       Result Value    Troponin I 0.032 (*)     All other components within normal limits     EKG Readings: (Independently Interpreted)   Sinus tachycardia, no obvious ischemic changes, no RV strain       Imaging Results           CTA Chest Non-Coronary (PE Study) (Final result)   Result time 02/13/22 21:37:07    Final result by Ken Goyal MD (02/13/22 21:37:07)                 Impression:      1. No evidence of pulmonary embolism.  There is limited visualization of segmental and subsegmental pulmonary arteries with suboptimal pulmonary artery enhancement.  Recommend follow-up as clinically indicated.  2. Mild bilateral peripheral patchy alveolar infiltrates suspicious for viral/COVID pneumonia.  Recommend clinical correlation and follow-up.  3.  This report was flagged in Epic as abnormal.      Electronically signed by: Ken Goyal  Date:    02/13/2022  Time:    21:37             Narrative:    EXAMINATION:  CTA CHEST NON CORONARY    CLINICAL HISTORY:  Pulmonary embolism (PE) suspected, high prob;    TECHNIQUE:  Low dose axial images, sagittal and coronal reformations were obtained from the thoracic inlet to the lung bases following the IV administration of 100 mL of Omnipaque 350.  Contrast timing was optimized to evaluate the pulmonary arteries.  MIP images were performed.    COMPARISON:  None    FINDINGS:  Pulmonary arteries:Pulmonary arteries are suboptimally enhanced.No filling defects to indicate pulmonary embolism.  Limited visualization of segmental and subsegmental pulmonary arteries.    Thoracic soft tissues: Unremarkable.    Aorta: Left-sided aortic arch.  No aneurysm or dissection.    Heart: Normal size. No effusion.    Sallie/Mediastinum: No pathologic jonny enlargement.    Airways: Patent.    Lungs/Pleura: Mild peripheral patchy alveolar infiltrate suspicious for viral/COVID pneumonia.  No mass or lobar consolidation.  No effusion or pneumothorax.    Esophagus: Unremarkable.    Upper Abdomen: No abnormality of the partially imaged upper abdomen.    Bones: No acute fracture. No suspicious lytic or sclerotic lesions.                                 Medications   iohexoL (OMNIPAQUE 350) injection 100 mL (100 mLs Intravenous Given 2/13/22 2123)     Medical Decision Making:    History:   Old Medical Records: I decided to obtain old medical records.  Initial Assessment:   This is a 41-year-old man with a history of COPD, hypertension and prior PE, recent diagnosis of COVID complicated by bilateral lower extremity DVT, who presents with acute onset shortness of breath, and blood-tinged sputum.  He was sent here for CTA of the chest.  We did obtain a CTA and is negative for acute large PE by my independent interpretation but is limited secondary to habitus for subsegmental interpretation.  He remains tachycardic here, and is dyspneic at rest, much worse with exertion.  His troponin here is higher than the troponin at the outside hospital, and I am concerned about cardiac strain from a either an on diagnosed PE versus myocarditis component from COVID versus demand from his COVID pneumonia.  He is not hypoxic at rest or with ambulation.  He has already therapeutically anticoagulated, I do not think he needs to be broadened, but I do think he would benefit from hospitalization and echo in the morning.  Clinical Tests:   Lab Tests: Ordered and Reviewed  Radiological Study: Ordered and Reviewed             ED Course as of 02/13/22 2216   Sun Feb 13, 2022 2035 Resp(!): 30 [EH]   2035 Pulse(!): 116 [EH]   2035 BP(!): 151/103 [EH]      ED Course User Index  [EH] Jaki Jackson PA-C             Clinical Impression:   Final diagnoses:  [R00.0] Tachycardia  [U07.1, J12.82] Pneumonia due to COVID-19 virus (Primary)  [I24.8] Demand ischemia          ED Disposition Condition    Admit               Elizabeth Gaming MD  02/13/22 2215

## 2022-02-14 NOTE — SUBJECTIVE & OBJECTIVE
Past Medical History:   Diagnosis Date    COPD (chronic obstructive pulmonary disease)     Hypertension     Pulmonary embolism 2022       Past Surgical History:   Procedure Laterality Date    ANKLE SURGERY Left        Review of patient's allergies indicates:  No Known Allergies    Current Facility-Administered Medications on File Prior to Encounter   Medication    [COMPLETED] albuterol-ipratropium 2.5 mg-0.5 mg/3 mL nebulizer solution 3 mL    [COMPLETED] methylPREDNISolone sodium succinate injection 125 mg     Current Outpatient Medications on File Prior to Encounter   Medication Sig    ADVAIR DISKUS 250-50 mcg/dose diskus inhaler Inhale 1 puff into the lungs 2 (two) times daily.    albuterol (PROVENTIL/VENTOLIN HFA) 90 mcg/actuation inhaler Inhale 8 puffs into the lungs every 6 (six) hours as needed for Wheezing. Rescue    albuterol-ipratropium (DUO-NEB) 2.5 mg-0.5 mg/3 mL nebulizer solution Take 3 mLs by nebulization every 6 (six) hours as needed.    apixaban (ELIQUIS) 5 mg Tab Take 2 tablets( 10mg ) twice daily x 7days; then decrease to 1 tablet (5mg) twice daily    baclofen (LIORESAL) 20 MG tablet Take 2 tablets (40 mg total) by mouth 2 (two) times daily.    fluticasone propionate (FLONASE) 50 mcg/actuation nasal spray 1 spray by Each Nostril route once daily.    gabapentin (NEURONTIN) 300 MG capsule Take 600 mg by mouth 3 (three) times daily.    HYDROcodone-acetaminophen (NORCO)  mg per tablet Take 1 tablet by mouth every 6 (six) hours as needed for Pain.    meloxicam (MOBIC) 15 MG tablet Take 15 mg by mouth once daily.    montelukast (SINGULAIR) 10 mg tablet Take 10 mg by mouth every evening.    pantoprazole (PROTONIX) 40 MG tablet Take 40 mg by mouth once daily.    testosterone cypionate (DEPOTESTOTERONE CYPIONATE) 200 mg/mL injection Inject 200 mg into the muscle every 14 (fourteen) days.    traZODone (DESYREL) 100 MG tablet Take 100 mg by mouth nightly.     Family History      Problem Relation (Age of Onset)    Diabetes Mellitus Mother, Father    Hypertension Father    Stroke Paternal Grandfather        Tobacco Use    Smoking status: Current Every Day Smoker     Years: 19.00     Types: Cigars     Start date: 3/4/1996    Smokeless tobacco: Never Used    Tobacco comment: pt stated smokes 5 cigars/day   Substance and Sexual Activity    Alcohol use: Yes     Alcohol/week: 0.0 standard drinks     Comment: occasional    Drug use: No    Sexual activity: Yes     Partners: Female     Birth control/protection: None     Review of Systems   Constitutional: Positive for activity change and fatigue. Negative for chills and fever.   HENT: Positive for congestion. Negative for sinus pain.    Eyes: Negative for photophobia and visual disturbance.   Respiratory: Positive for cough and shortness of breath.    Cardiovascular: Positive for leg swelling. Negative for chest pain.   Gastrointestinal: Negative for abdominal pain, nausea and vomiting.   Endocrine: Negative for polydipsia and polyuria.   Genitourinary: Negative for dysuria and hematuria.   Musculoskeletal: Positive for arthralgias and myalgias.   Skin: Negative for color change and rash.   Neurological: Negative for dizziness, syncope and light-headedness.     Objective:     Vital Signs (Most Recent):  Temp: 98.4 °F (36.9 °C) (02/14/22 0105)  Pulse: 104 (02/14/22 0105)  Resp: (!) 22 (02/14/22 0105)  BP: (!) 151/79 (02/14/22 0105)  SpO2: 96 % (02/13/22 2242) Vital Signs (24h Range):  Temp:  [97.1 °F (36.2 °C)-99.2 °F (37.3 °C)] 98.4 °F (36.9 °C)  Pulse:  [] 104  Resp:  [17-30] 22  SpO2:  [95 %-100 %] 96 %  BP: (127-193)/() 151/79     Weight: (!) 223.3 kg (492 lb 4.6 oz)  Body mass index is 72.7 kg/m².    Physical Exam  Vitals and nursing note reviewed.   Constitutional:       General: He is not in acute distress.     Appearance: He is morbidly obese.   HENT:      Head: Normocephalic and atraumatic.      Nose:      Comments: Wearing  surgical mask     Mouth/Throat:      Comments: Wearing surgical mask  Eyes:      General: No scleral icterus.     Extraocular Movements: Extraocular movements intact.      Conjunctiva/sclera: Conjunctivae normal.      Pupils: Pupils are equal, round, and reactive to light.   Cardiovascular:      Rate and Rhythm: Normal rate and regular rhythm.      Pulses: Normal pulses.      Heart sounds: Normal heart sounds.   Pulmonary:      Effort: Pulmonary effort is normal. No respiratory distress.   Abdominal:      General: Bowel sounds are normal. There is no distension.      Palpations: Abdomen is soft.      Tenderness: There is no abdominal tenderness. There is no guarding.   Musculoskeletal:         General: No tenderness or signs of injury.      Cervical back: Normal range of motion and neck supple. No rigidity.   Lymphadenopathy:      Cervical: No cervical adenopathy.   Skin:     General: Skin is warm and dry.      Findings: No erythema or lesion.   Neurological:      Mental Status: He is alert and oriented to person, place, and time.      Cranial Nerves: No cranial nerve deficit.   Psychiatric:         Behavior: Behavior is cooperative.           CRANIAL NERVES     CN III, IV, VI   Pupils are equal, round, and reactive to light.       Significant Labs:   All pertinent labs within the past 24 hours have been reviewed.  CBC:   Recent Labs   Lab 02/13/22  1328   WBC 6.60   HGB 14.0   HCT 44.4        CMP:   Recent Labs   Lab 02/13/22  1328      K 4.3      CO2 28   *   BUN 10   CREATININE 1.3   CALCIUM 9.5   PROT 7.8   ALBUMIN 3.7   BILITOT 0.6   ALKPHOS 87   AST 27   ALT 54*   ANIONGAP 9   EGFRNONAA >60       Significant Imaging: I have reviewed all pertinent imaging results/findings within the past 24 hours.

## 2022-02-14 NOTE — ASSESSMENT & PLAN NOTE
Will replace his Symbicort with Breo while in the hospital.  Will use inhalers as per COVID order set, and if needed can order nebulizer treatments.  Unclear if he has had formal PFTs.  He has a smoking history, but is unclear to what extent his morbid obesity make be contributing to his respiratory difficulties.

## 2022-02-14 NOTE — PLAN OF CARE
[facility] ([place] [id number])  Initial Discharge Assessment       Primary Care Provider: Aden Rabago MD    Admission Diagnosis: Tachycardia [R00.0]  Demand ischemia [I24.8]  Pneumonia due to COVID-19 virus [U07.1, J12.82]    Admission Date: 2/13/2022  Expected Discharge Date: 2/18/2022     Payor: MEDICAID / Plan: [facility] COMMUNITY PLAN [facility] ([place] MEDICAID) / Product Type: Managed Medicaid /     Extended Emergency Contact Information  Primary Emergency Contact: Alec Fernandez   [place]  Mobile Phone: [phone]  Relation: Spouse  Secondary Emergency Contact: Dorothea Fernandez   [place]  Mobile Phone: [phone]  Relation: Mother    Discharge Plan A: Home with family  Discharge Plan B: Home Health      [id number] - [place], [place] - [address] AT [address]  [address]  [place] [address]  Phone: [phone] Fax: [phone]    [id number] - [place], [place] - [address] AT [address]EC OF [facility] [address]  [address]  [place] [address]  Phone: [phone] Fax: [phone]      Initial Assessment (most recent)       Adult Discharge Assessment - 02/14/22 1115          Discharge Assessment    Assessment Type Discharge Planning Assessment     Confirmed/corrected address, phone number and insurance Yes     Confirmed Demographics Correct on Facesheet     Source of Information patient     Communicated GREY with patient/caregiver Date not available/Unable to determine     Lives With spouse   Alec Fernandez ([phone])    Do you expect to return to your current living situation? Yes     Do you have help at home or someone to help you manage your care at home? Yes     Prior to hospitilization cognitive status: Alert/Oriented     Current cognitive status: Alert/Oriented     Walking or Climbing Stairs Difficulty none     Dressing/Bathing Difficulty none     Equipment Currently Used at Home  CPAP;nebulizer     Readmission within 30 days? No     Patient currently being followed by outpatient case management? No     Do you currently have service(s) that help you manage your care at home? No     Do you take prescription medications? Yes     Do you have prescription coverage? Yes     Do you have any problems affording any of your prescribed medications? No     Is the patient taking medications as prescribed? yes     How do you get to doctors appointments? family or friend will provide;car, drives self     Are you on dialysis? No     Do you take coumadin? No     Discharge Plan A Home with family     Discharge Plan B Home Health     DME Needed Upon Discharge  other (see comments)   tbd    Discharge Plan discussed with: Patient;Spouse/sig other                     Patient resting quietly in bed with spouse, Alec Fernandez ([phone]), at the bedside when CM rounded. Patient was admitted with covid & was transferred from [facility] ED to [facility] to have a CT scan done regarding mj DVTs. Remdesivir order noted. Pox 98% on RA this AM.    Patient lives with Alec & their 10 y.o. daughter, is independent of all ADLs, & denied the need for assistance with transportation at time of discharge.       Will continue to follow.

## 2022-02-14 NOTE — DISCHARGE INSTRUCTIONS
You were seen in the ER today to make sure you do not have a blood clot in your lungs. Your CT scan does not show one. You do have clots in your legs so you should keep taking your blood thinners. Follow up with your primary doctor this week for re-evaluation.

## 2022-02-14 NOTE — ASSESSMENT & PLAN NOTE
Body mass index is 72.7 kg/m². Morbid obesity complicates all aspects of disease management from diagnostic modalities to treatment. Weight loss encouraged and health benefits explained to patient.   This presents his most significant risk factor for severe COVID-19 illness, along with his pulmonary issues.

## 2022-02-14 NOTE — H&P
[facility] - Intensive Care ([address] Medicine  History & Physical    Patient Name: Marcello Fernandez  MRN: [id number]  Patient Class: IP- Inpatient  Admission Date: 2/13/2022  Attending Physician: Batsheva Weeks MD   Primary Care Provider: Aden Rabago MD         Patient information was obtained from patient, parent, past medical records and ER records.     Subjective:     Principal Problem:COVID-19    Chief Complaint:   Chief Complaint   Patient presents with    Shortness of Breath     Pt is a transfer from [facility] for CTA chest. Pt c/o of SOB x1 week.         HPI: 41-year-old male with morbid obesity and COPD transferred from [facility] ED for a CTA due to exceeding size limits for the scanner at that facility.  He had presented there due to worsening dyspnea and cough productive of blood flecked sputum.  He tested positive for COVID-19 on 2/3 at a [facility] and due to progressive dyspnea and pain in his legs he presented to [facility] on 02/06 and was diagnosed with bilateral DVTs.  He was admitted the hospital and started on anticoagulation; initially a heparin drip which was transitioned to Eliquis prior to discharge on 02/09.  He was given dexamethasone for COVID-19 during that hospitalization despite having a normal chest x-ray and no hypoxemia.  Since leaving the hospital his exertional dyspnea has increased.  He wears CPAP at night but lately he has been taking it off at night because he feels like he is not getting enough air to breathe when wearing it.      Past Medical History:   Diagnosis Date    COPD (chronic obstructive pulmonary disease)     Hypertension     Pulmonary embolism 2022       Past Surgical History:   Procedure Laterality Date    ANKLE SURGERY Left        Review of patient's allergies indicates:  No Known Allergies    Current Facility-Administered Medications on File Prior to Encounter   Medication    [COMPLETED] albuterol-ipratropium 2.5 mg-0.5 mg/3 mL  nebulizer solution 3 mL    [COMPLETED] methylPREDNISolone sodium succinate injection 125 mg     Current Outpatient Medications on File Prior to Encounter   Medication Sig    ADVAIR DISKUS 250-50 mcg/dose diskus inhaler Inhale 1 puff into the lungs 2 (two) times daily.    albuterol (PROVENTIL/VENTOLIN HFA) 90 mcg/actuation inhaler Inhale 8 puffs into the lungs every 6 (six) hours as needed for Wheezing. Rescue    albuterol-ipratropium (DUO-NEB) 2.5 mg-0.5 mg/3 mL nebulizer solution Take 3 mLs by nebulization every 6 (six) hours as needed.    apixaban (ELIQUIS) 5 mg Tab Take 2 tablets( 10mg ) twice daily x 7days; then decrease to 1 tablet (5mg) twice daily    baclofen (LIORESAL) 20 MG tablet Take 2 tablets (40 mg total) by mouth 2 (two) times daily.    fluticasone propionate (FLONASE) 50 mcg/actuation nasal spray 1 spray by Each Nostril route once daily.    gabapentin (NEURONTIN) 300 MG capsule Take 600 mg by mouth 3 (three) times daily.    HYDROcodone-acetaminophen (NORCO)  mg per tablet Take 1 tablet by mouth every 6 (six) hours as needed for Pain.    meloxicam (MOBIC) 15 MG tablet Take 15 mg by mouth once daily.    montelukast (SINGULAIR) 10 mg tablet Take 10 mg by mouth every evening.    pantoprazole (PROTONIX) 40 MG tablet Take 40 mg by mouth once daily.    testosterone cypionate (DEPOTESTOTERONE CYPIONATE) 200 mg/mL injection Inject 200 mg into the muscle every 14 (fourteen) days.    traZODone (DESYREL) 100 MG tablet Take 100 mg by mouth nightly.     Family History     Problem Relation (Age of Onset)    Diabetes Mellitus Mother, Father    Hypertension Father    Stroke Paternal Grandfather        Tobacco Use    Smoking status: Current Every Day Smoker     Years: 19.00     Types: Cigars     Start date: 3/4/1996    Smokeless tobacco: Never Used    Tobacco comment: pt stated smokes 5 cigars/day   Substance and Sexual Activity    Alcohol use: Yes     Alcohol/week: 0.0 standard drinks      Comment: occasional    Drug use: No    Sexual activity: Yes     Partners: Female     Birth control/protection: None     Review of Systems   Constitutional: Positive for activity change and fatigue. Negative for chills and fever.   HENT: Positive for congestion. Negative for sinus pain.    Eyes: Negative for photophobia and visual disturbance.   Respiratory: Positive for cough and shortness of breath.    Cardiovascular: Positive for leg swelling. Negative for chest pain.   Gastrointestinal: Negative for abdominal pain, nausea and vomiting.   Endocrine: Negative for polydipsia and polyuria.   Genitourinary: Negative for dysuria and hematuria.   Musculoskeletal: Positive for arthralgias and myalgias.   Skin: Negative for color change and rash.   Neurological: Negative for dizziness, syncope and light-headedness.     Objective:     Vital Signs (Most Recent):  Temp: 98.4 °F (36.9 °C) (02/14/22 0105)  Pulse: 104 (02/14/22 0105)  Resp: (!) 22 (02/14/22 0105)  BP: (!) 151/79 (02/14/22 0105)  SpO2: 96 % (02/13/22 2242) Vital Signs (24h Range):  Temp:  [97.1 °F (36.2 °C)-99.2 °F (37.3 °C)] 98.4 °F (36.9 °C)  Pulse:  [] 104  Resp:  [17-30] 22  SpO2:  [95 %-100 %] 96 %  BP: (127-193)/() 151/79     Weight: (!) 223.3 kg (492 lb 4.6 oz)  Body mass index is 72.7 kg/m².    Physical Exam  Vitals and nursing note reviewed.   Constitutional:       General: He is not in acute distress.     Appearance: He is morbidly obese.   HENT:      Head: Normocephalic and atraumatic.      Nose:      Comments: Wearing surgical mask     Mouth/Throat:      Comments: Wearing surgical mask  Eyes:      General: No scleral icterus.     Extraocular Movements: Extraocular movements intact.      Conjunctiva/sclera: Conjunctivae normal.      Pupils: Pupils are equal, round, and reactive to light.   Cardiovascular:      Rate and Rhythm: Normal rate and regular rhythm.      Pulses: Normal pulses.      Heart sounds: Normal heart sounds.   Pulmonary:       Effort: Pulmonary effort is normal. No respiratory distress.   Abdominal:      General: Bowel sounds are normal. There is no distension.      Palpations: Abdomen is soft.      Tenderness: There is no abdominal tenderness. There is no guarding.   Musculoskeletal:         General: No tenderness or signs of injury.      Cervical back: Normal range of motion and neck supple. No rigidity.   Lymphadenopathy:      Cervical: No cervical adenopathy.   Skin:     General: Skin is warm and dry.      Findings: No erythema or lesion.   Neurological:      Mental Status: He is alert and oriented to person, place, and time.      Cranial Nerves: No cranial nerve deficit.   Psychiatric:         Behavior: Behavior is cooperative.           CRANIAL NERVES     CN III, IV, VI   Pupils are equal, round, and reactive to light.       Significant Labs:   All pertinent labs within the past 24 hours have been reviewed.  CBC:   Recent Labs   Lab 02/13/22  1328   WBC 6.60   HGB 14.0   HCT 44.4        CMP:   Recent Labs   Lab 02/13/22  1328      K 4.3      CO2 28   *   BUN 10   CREATININE 1.3   CALCIUM 9.5   PROT 7.8   ALBUMIN 3.7   BILITOT 0.6   ALKPHOS 87   AST 27   ALT 54*   ANIONGAP 9   EGFRNONAA >60       Significant Imaging: I have reviewed all pertinent imaging results/findings within the past 24 hours.    Assessment/Plan:     * COVID-19  Patient is identified as High risk for severe complications of COVID 19 based on COVID risk score of 3   Initiate standard COVID protocols; COVID-19 testing Collection Date: 2/7/2022 Collection Time:   3:48 AM ,Infection Control notification  and isolation- respiratory, contact and droplet per protocol    Diagnostics: (leukopenia, hyponatremia, hyperferritinemia, elevated troponin, elevated d-dimer, age, and comorbidities are significant predictors of poor clinical outcome)  CBC, CMP, Procalcitonin, Ferritin, CRP, BNP and Portable CXR    Management: Initiate targeted therapy  with Remdesivir, 200mg IV x1, followed by 100mg IV daily x5 days total, Inhaled bronchodilators as needed for shortness of breath. and Continuous cardiac monitoring.  He does not require any supplemental oxygen so does not meet criteria for dexamethasone.  His morbid obesity likely puts him at increased risk of complications from corticosteroid use.    Advance Care Planning  Current advance care plan has not been discussed with patient/family/POA and patient currently wishes Full Code.        Chronic bronchitis  Will replace his Symbicort with Breo while in the hospital.  Will use inhalers as per COVID order set, and if needed can order nebulizer treatments.  Unclear if he has had formal PFTs.  He has a smoking history, but is unclear to what extent his morbid obesity make be contributing to his respiratory difficulties.      Acute deep vein thrombosis (DVT) of both lower extremities  Still on Eliquis initiation at 10 mg b.i.d. through Tuesday, when he will step down to 5 mg b.i.d..      Class 3 severe obesity due to excess calories with serious comorbidity and body mass index (BMI) greater than or equal to 70 in adult  Body mass index is 72.7 kg/m². Morbid obesity complicates all aspects of disease management from diagnostic modalities to treatment. Weight loss encouraged and health benefits explained to patient.   This presents his most significant risk factor for severe COVID-19 illness, along with his pulmonary issues.        VTE Risk Mitigation (From admission, onward)         Ordered     apixaban tablet 5 mg  2 times daily         02/13/22 2331     apixaban tablet 10 mg  2 times daily         02/13/22 2331                   Jose Tee MD  Department of Hospital Medicine   [facility] - Intensive Care ([place])

## 2022-02-15 PROBLEM — G47.33 OBSTRUCTIVE SLEEP APNEA ON CPAP: Status: ACTIVE | Noted: 2022-02-15

## 2022-02-15 LAB
ALBUMIN SERPL BCP-MCNC: 3.2 G/DL (ref 3.5–5.2)
ALP SERPL-CCNC: 76 U/L (ref 55–135)
ALT SERPL W/O P-5'-P-CCNC: 38 U/L (ref 10–44)
ANION GAP SERPL CALC-SCNC: 12 MMOL/L (ref 8–16)
AST SERPL-CCNC: 26 U/L (ref 10–40)
BASOPHILS # BLD AUTO: 0.02 K/UL (ref 0–0.2)
BASOPHILS NFR BLD: 0.3 % (ref 0–1.9)
BILIRUB SERPL-MCNC: 0.7 MG/DL (ref 0.1–1)
BUN SERPL-MCNC: 14 MG/DL (ref 6–20)
CALCIUM SERPL-MCNC: 8.7 MG/DL (ref 8.7–10.5)
CHLORIDE SERPL-SCNC: 98 MMOL/L (ref 95–110)
CO2 SERPL-SCNC: 26 MMOL/L (ref 23–29)
CREAT SERPL-MCNC: 1.2 MG/DL (ref 0.5–1.4)
D DIMER PPP IA.FEU-MCNC: 0.64 MG/L FEU
DIFFERENTIAL METHOD: ABNORMAL
EOSINOPHIL # BLD AUTO: 0 K/UL (ref 0–0.5)
EOSINOPHIL NFR BLD: 0.1 % (ref 0–8)
ERYTHROCYTE [DISTWIDTH] IN BLOOD BY AUTOMATED COUNT: 13.9 % (ref 11.5–14.5)
EST. GFR  (AFRICAN AMERICAN): >60 ML/MIN/1.73 M^2
EST. GFR  (NON AFRICAN AMERICAN): >60 ML/MIN/1.73 M^2
GLUCOSE SERPL-MCNC: 195 MG/DL (ref 70–110)
HCT VFR BLD AUTO: 44.1 % (ref 40–54)
HGB BLD-MCNC: 13.8 G/DL (ref 14–18)
IMM GRANULOCYTES # BLD AUTO: 0.03 K/UL (ref 0–0.04)
IMM GRANULOCYTES NFR BLD AUTO: 0.4 % (ref 0–0.5)
LYMPHOCYTES # BLD AUTO: 1.7 K/UL (ref 1–4.8)
LYMPHOCYTES NFR BLD: 23.9 % (ref 18–48)
MAGNESIUM SERPL-MCNC: 1.7 MG/DL (ref 1.6–2.6)
MCH RBC QN AUTO: 27.8 PG (ref 27–31)
MCHC RBC AUTO-ENTMCNC: 31.3 G/DL (ref 32–36)
MCV RBC AUTO: 89 FL (ref 82–98)
MONOCYTES # BLD AUTO: 0.5 K/UL (ref 0.3–1)
MONOCYTES NFR BLD: 7.5 % (ref 4–15)
NEUTROPHILS # BLD AUTO: 4.8 K/UL (ref 1.8–7.7)
NEUTROPHILS NFR BLD: 67.8 % (ref 38–73)
NRBC BLD-RTO: 0 /100 WBC
PHOSPHATE SERPL-MCNC: 3.2 MG/DL (ref 2.7–4.5)
PLATELET # BLD AUTO: 184 K/UL (ref 150–450)
PMV BLD AUTO: 11.4 FL (ref 9.2–12.9)
POTASSIUM SERPL-SCNC: 4.4 MMOL/L (ref 3.5–5.1)
PROT SERPL-MCNC: 7.2 G/DL (ref 6–8.4)
RBC # BLD AUTO: 4.97 M/UL (ref 4.6–6.2)
SODIUM SERPL-SCNC: 136 MMOL/L (ref 136–145)
WBC # BLD AUTO: 7.03 K/UL (ref 3.9–12.7)

## 2022-02-15 PROCEDURE — 63600175 PHARM REV CODE 636 W HCPCS: Performed by: HOSPITALIST

## 2022-02-15 PROCEDURE — 36415 COLL VENOUS BLD VENIPUNCTURE: CPT | Performed by: HOSPITALIST

## 2022-02-15 PROCEDURE — 36415 COLL VENOUS BLD VENIPUNCTURE: CPT | Performed by: INTERNAL MEDICINE

## 2022-02-15 PROCEDURE — 76937 US GUIDE VASCULAR ACCESS: CPT

## 2022-02-15 PROCEDURE — 80053 COMPREHEN METABOLIC PANEL: CPT | Performed by: INTERNAL MEDICINE

## 2022-02-15 PROCEDURE — 63700000 PHARM REV CODE 250 ALT 637 W/O HCPCS: Performed by: INTERNAL MEDICINE

## 2022-02-15 PROCEDURE — 25000242 PHARM REV CODE 250 ALT 637 W/ HCPCS: Performed by: HOSPITALIST

## 2022-02-15 PROCEDURE — 99232 SBSQ HOSP IP/OBS MODERATE 35: CPT | Mod: 95,,, | Performed by: INTERNAL MEDICINE

## 2022-02-15 PROCEDURE — 99232 PR SUBSEQUENT HOSPITAL CARE,LEVL II: ICD-10-PCS | Mod: 95,,, | Performed by: INTERNAL MEDICINE

## 2022-02-15 PROCEDURE — 85379 FIBRIN DEGRADATION QUANT: CPT | Performed by: HOSPITALIST

## 2022-02-15 PROCEDURE — 94640 AIRWAY INHALATION TREATMENT: CPT

## 2022-02-15 PROCEDURE — 27100098 HC SPACER

## 2022-02-15 PROCEDURE — 25000003 PHARM REV CODE 250: Performed by: HOSPITALIST

## 2022-02-15 PROCEDURE — 83735 ASSAY OF MAGNESIUM: CPT | Performed by: INTERNAL MEDICINE

## 2022-02-15 PROCEDURE — 94799 UNLISTED PULMONARY SVC/PX: CPT

## 2022-02-15 PROCEDURE — 63600175 PHARM REV CODE 636 W HCPCS: Performed by: INTERNAL MEDICINE

## 2022-02-15 PROCEDURE — 99900035 HC TECH TIME PER 15 MIN (STAT)

## 2022-02-15 PROCEDURE — 85025 COMPLETE CBC W/AUTO DIFF WBC: CPT | Performed by: HOSPITALIST

## 2022-02-15 PROCEDURE — 84100 ASSAY OF PHOSPHORUS: CPT | Performed by: INTERNAL MEDICINE

## 2022-02-15 PROCEDURE — 94761 N-INVAS EAR/PLS OXIMETRY MLT: CPT

## 2022-02-15 PROCEDURE — 94660 CPAP INITIATION&MGMT: CPT

## 2022-02-15 PROCEDURE — 12000002 HC ACUTE/MED SURGE SEMI-PRIVATE ROOM

## 2022-02-15 PROCEDURE — C1751 CATH, INF, PER/CENT/MIDLINE: HCPCS

## 2022-02-15 PROCEDURE — 27000221 HC OXYGEN, UP TO 24 HOURS

## 2022-02-15 PROCEDURE — 36410 VNPNXR 3YR/> PHY/QHP DX/THER: CPT

## 2022-02-15 PROCEDURE — 25000003 PHARM REV CODE 250: Performed by: INTERNAL MEDICINE

## 2022-02-15 PROCEDURE — 27000207 HC ISOLATION

## 2022-02-15 RX ORDER — AZITHROMYCIN 250 MG/1
500 TABLET, FILM COATED ORAL EVERY 24 HOURS
Status: COMPLETED | OUTPATIENT
Start: 2022-02-15 | End: 2022-02-17

## 2022-02-15 RX ORDER — GUAIFENESIN/DEXTROMETHORPHAN 100-10MG/5
10 SYRUP ORAL 3 TIMES DAILY
Status: DISCONTINUED | OUTPATIENT
Start: 2022-02-15 | End: 2022-02-18 | Stop reason: HOSPADM

## 2022-02-15 RX ORDER — SUCRALFATE 1 G/1
1 TABLET ORAL
Status: DISCONTINUED | OUTPATIENT
Start: 2022-02-15 | End: 2022-02-18 | Stop reason: HOSPADM

## 2022-02-15 RX ORDER — PROMETHAZINE HYDROCHLORIDE AND CODEINE PHOSPHATE 6.25; 1 MG/5ML; MG/5ML
2.5 SOLUTION ORAL EVERY 4 HOURS PRN
Status: DISCONTINUED | OUTPATIENT
Start: 2022-02-15 | End: 2022-02-18 | Stop reason: HOSPADM

## 2022-02-15 RX ADMIN — CEFTRIAXONE 2 G: 2 INJECTION, SOLUTION INTRAVENOUS at 03:02

## 2022-02-15 RX ADMIN — FLUTICASONE PROPIONATE 50 MCG: 50 SPRAY, METERED NASAL at 08:02

## 2022-02-15 RX ADMIN — GABAPENTIN 600 MG: 300 CAPSULE ORAL at 03:02

## 2022-02-15 RX ADMIN — AZITHROMYCIN MONOHYDRATE 500 MG: 250 TABLET ORAL at 03:02

## 2022-02-15 RX ADMIN — OXYCODONE HYDROCHLORIDE AND ACETAMINOPHEN 500 MG: 500 TABLET ORAL at 08:02

## 2022-02-15 RX ADMIN — REMDESIVIR 100 MG: 100 INJECTION, POWDER, LYOPHILIZED, FOR SOLUTION INTRAVENOUS at 08:02

## 2022-02-15 RX ADMIN — PANTOPRAZOLE SODIUM 40 MG: 40 TABLET, DELAYED RELEASE ORAL at 08:02

## 2022-02-15 RX ADMIN — Medication 6 MG: at 08:02

## 2022-02-15 RX ADMIN — PROMETHAZINE HYDROCHLORIDE AND CODEINE PHOSPHATE 2.5 ML: 10; 6.25 SOLUTION ORAL at 08:02

## 2022-02-15 RX ADMIN — APIXABAN 10 MG: 5 TABLET, FILM COATED ORAL at 08:02

## 2022-02-15 RX ADMIN — GUAIFENESIN AND DEXTROMETHORPHAN 10 ML: 100; 10 SYRUP ORAL at 03:02

## 2022-02-15 RX ADMIN — SUCRALFATE 1 G: 1 TABLET ORAL at 08:02

## 2022-02-15 RX ADMIN — BACLOFEN 40 MG: 10 TABLET ORAL at 08:02

## 2022-02-15 RX ADMIN — TRAZODONE HYDROCHLORIDE 100 MG: 100 TABLET ORAL at 09:02

## 2022-02-15 RX ADMIN — GABAPENTIN 600 MG: 300 CAPSULE ORAL at 08:02

## 2022-02-15 RX ADMIN — MELOXICAM 15 MG: 15 TABLET ORAL at 08:02

## 2022-02-15 RX ADMIN — THERA TABS 1 TABLET: TAB at 08:02

## 2022-02-15 RX ADMIN — FLUTICASONE FUROATE AND VILANTEROL TRIFENATATE 1 PUFF: 100; 25 POWDER RESPIRATORY (INHALATION) at 08:02

## 2022-02-15 RX ADMIN — ALBUTEROL SULFATE 2 PUFF: 108 INHALANT RESPIRATORY (INHALATION) at 01:02

## 2022-02-15 RX ADMIN — DEXAMETHASONE 6 MG: 4 TABLET ORAL at 08:02

## 2022-02-15 RX ADMIN — MONTELUKAST 10 MG: 10 TABLET, FILM COATED ORAL at 08:02

## 2022-02-15 RX ADMIN — ALBUTEROL SULFATE 2 PUFF: 108 INHALANT RESPIRATORY (INHALATION) at 05:02

## 2022-02-15 RX ADMIN — ALBUTEROL SULFATE 2 PUFF: 108 INHALANT RESPIRATORY (INHALATION) at 06:02

## 2022-02-15 RX ADMIN — HYDROCODONE BITARTRATE AND ACETAMINOPHEN 1 TABLET: 10; 325 TABLET ORAL at 08:02

## 2022-02-15 RX ADMIN — SUCRALFATE 1 G: 1 TABLET ORAL at 04:02

## 2022-02-15 RX ADMIN — ALBUTEROL SULFATE 2 PUFF: 108 INHALANT RESPIRATORY (INHALATION) at 03:02

## 2022-02-15 NOTE — PLAN OF CARE
0905  Hospital follow up appointment scheduled for the patient with Dr. Aden Rabago (PCP) on 2/24/2022 at 1345. Information added to the patient's discharge paperwork.      Will continue to follow.

## 2022-02-15 NOTE — ASSESSMENT & PLAN NOTE
Oxygen placed once SpO2 at 91%; CT scan showed viral pneumonia; patient with hemoptysis 2/15 (also at home); respiratory cx ordered; empiric Abx with rocephin and zithromax    Patient is identified as High risk for severe complications of COVID 19 based on COVID risk score of 3   Initiate standard COVID protocols; COVID-19 testing Collection Date: 2/7/2022 Collection Time:   3:48 AM ,Infection Control notification  and isolation- respiratory, contact and droplet per protocol    Diagnostics: (leukopenia, hyponatremia, hyperferritinemia, elevated troponin, elevated d-dimer, age, and comorbidities are significant predictors of poor clinical outcome)  CBC, CMP, Procalcitonin, Ferritin, CRP, BNP and Portable CXR    Management: Initiate targeted therapy with Remdesivir, 200mg IV x1, followed by 100mg IV daily x5 days total, Dexamethasone PO/IV 6mg daily x10 days and Anticoagulation, Patient with contraindication to anticoagulation- already on apixaban, Inhaled bronchodilators as needed for shortness of breath. and Continuous cardiac monitoring.    Advance Care Planning  Current advance care plan has not been discussed with patient/family/POA and patient currently wishes Full Code.      Will consider discharging after 3 days of remdesivir.

## 2022-02-15 NOTE — CONSULTS
Placed 18 gauge x 8 cm midline in Left cephalic vein using realtime ultrasound guidance.  Lot#[id number].  Removal date on or before 3/16/22.  Imagery recorded and saved.

## 2022-02-15 NOTE — SUBJECTIVE & OBJECTIVE
This encounter was provided through telemedicine.  Patient was transferred to the telemedicine service on:  02/15/2022   The patient location is: [id number] A admitted 2/13/2022  7:41 PM.  Present with the patient at the time of the telemed/virtual assessment: n/a    Interval History/Overnight Events:   Clinical record since admit reviewed.    About 10 episodes of hemoptysis before lunch and would fill bottom of cup; slowed after lunch -     Tessalon perles exacerbate cough; + fever overnight; feeling stronger today and more active; has been having indigestion with eating and taking meds; suffers with reflux at home; + diarrhea today; only had CPAP 2 weeks at home and usually has to remove during the night; no taste/smell changes      Review of Systems   Constitutional: Positive for fatigue and fever.   HENT: Negative for sore throat.    Respiratory: Positive for cough and shortness of breath.    Cardiovascular: Negative for chest pain.   Gastrointestinal: Positive for diarrhea. Negative for vomiting.   Musculoskeletal: Positive for myalgias.        Inpatient Medications:  Scheduled Meds:   albuterol  2 puff Inhalation Q6H    apixaban  10 mg Oral BID    [START ON 2/16/2022] apixaban  5 mg Oral BID    ascorbic acid (vitamin C)  500 mg Oral BID    baclofen  40 mg Oral BID    dexAMETHasone  6 mg Oral Daily    fluticasone furoate-vilanteroL  1 puff Inhalation Daily    fluticasone propionate  1 spray Each Nostril Daily    gabapentin  600 mg Oral TID    meloxicam  15 mg Oral Daily    montelukast  10 mg Oral QHS    multivitamin  1 tablet Oral Daily    pantoprazole  40 mg Oral Daily    remdesivir infusion  100 mg Intravenous Daily    traZODone  100 mg Oral Nightly     Continuous Infusions:  PRN Meds:.acetaminophen, benzonatate, dextrose 10%, dextrose 10%, glucagon (human recombinant), glucose, glucose, HYDROcodone-acetaminophen, loperamide, melatonin, prochlorperazine, senna-docusate 8.6-50 mg, sodium  chloride 0.9%      Objective:     Temp:  [98.4 °F (36.9 °C)-101.1 °F (38.4 °C)] 99 °F (37.2 °C)  Pulse:  [] 101  Resp:  [15-22] 15  SpO2:  [91 %-97 %] 96 %  BP: (132-148)/(62-91) 133/78    No intake or output data in the 24 hours ending 02/15/22 1309     Body mass index is 72.7 kg/m².    Physical Exam  Vitals and nursing note reviewed.   Constitutional:       General: He is not in acute distress.     Appearance: He is obese. He is ill-appearing.   HENT:      Head: Normocephalic and atraumatic.      Right Ear: Hearing normal.      Left Ear: Hearing normal.      Nose: Nose normal.   Eyes:      General: No scleral icterus.        Right eye: No discharge.         Left eye: No discharge.      Extraocular Movements: Extraocular movements intact.   Cardiovascular:      Rate and Rhythm: Normal rate.   Pulmonary:      Effort: Pulmonary effort is normal. No accessory muscle usage or respiratory distress.   Skin:     Findings: No rash.   Neurological:      General: No focal deficit present.      Mental Status: He is alert and oriented to person, place, and time.      Cranial Nerves: No cranial nerve deficit.      Motor: No weakness.   Psychiatric:         Attention and Perception: Attention normal.         Mood and Affect: Affect is flat.         Behavior: Behavior is cooperative.         Thought Content: Thought content normal.          Labs:  Recent Results (from the past 24 hour(s))   CBC with Automated Differential    Collection Time: 02/15/22  5:47 AM   Result Value Ref Range    WBC 7.03 3.90 - 12.70 K/uL    RBC 4.97 4.60 - 6.20 M/uL    Hemoglobin 13.8 (L) 14.0 - 18.0 g/dL    Hematocrit 44.1 40.0 - 54.0 %    MCV 89 82 - 98 fL    MCH 27.8 27.0 - 31.0 pg    MCHC 31.3 (L) 32.0 - 36.0 g/dL    RDW 13.9 11.5 - 14.5 %    Platelets 184 150 - 450 K/uL    MPV 11.4 9.2 - 12.9 fL    Immature Granulocytes 0.4 0.0 - 0.5 %    Gran # (ANC) 4.8 1.8 - 7.7 K/uL    Immature Grans (Abs) 0.03 0.00 - 0.04 K/uL    Lymph # 1.7 1.0 - 4.8  K/uL    Mono # 0.5 0.3 - 1.0 K/uL    Eos # 0.0 0.0 - 0.5 K/uL    Baso # 0.02 0.00 - 0.20 K/uL    nRBC 0 0 /100 WBC    Gran % 67.8 38.0 - 73.0 %    Lymph % 23.9 18.0 - 48.0 %    Mono % 7.5 4.0 - 15.0 %    Eosinophil % 0.1 0.0 - 8.0 %    Basophil % 0.3 0.0 - 1.9 %    Differential Method Automated    Comprehensive metabolic panel    Collection Time: 02/15/22  9:44 AM   Result Value Ref Range    Sodium 136 136 - 145 mmol/L    Potassium 4.4 3.5 - 5.1 mmol/L    Chloride 98 95 - 110 mmol/L    CO2 26 23 - 29 mmol/L    Glucose 195 (H) 70 - 110 mg/dL    BUN 14 6 - 20 mg/dL    Creatinine 1.2 0.5 - 1.4 mg/dL    Calcium 8.7 8.7 - 10.5 mg/dL    Total Protein 7.2 6.0 - 8.4 g/dL    Albumin 3.2 (L) 3.5 - 5.2 g/dL    Total Bilirubin 0.7 0.1 - 1.0 mg/dL    Alkaline Phosphatase 76 55 - 135 U/L    AST 26 10 - 40 U/L    ALT 38 10 - 44 U/L    Anion Gap 12 8 - 16 mmol/L    eGFR if African American >60.0 >60 mL/min/1.73 m^2    eGFR if non African American >60.0 >60 mL/min/1.73 m^2   Magnesium    Collection Time: 02/15/22  9:44 AM   Result Value Ref Range    Magnesium 1.7 1.6 - 2.6 mg/dL   Phosphorus    Collection Time: 02/15/22  9:44 AM   Result Value Ref Range    Phosphorus 3.2 2.7 - 4.5 mg/dL        Lab Results   Component Value Date    [id number] Positive (A) 02/13/2022       Recent Labs   Lab 02/13/22  1328 02/13/22  1328 02/14/22  0347 02/15/22  0547   WBC 6.60  --  5.61 7.03   LYMPH 20.6  1.4   < > 14.4*  0.8* 23.9  1.7   HGB 14.0  --  13.1* 13.8*   HCT 44.4  --  42.2 44.1     --  182 184    < > = values in this interval not displayed.     Recent Labs   Lab 02/13/22  1328 02/15/22  0944    136   K 4.3 4.4    98   CO2 28 26   BUN 10 14   CREATININE 1.3 1.2   * 195*   CALCIUM 9.5 8.7   MG  --  1.7   PHOS  --  3.2     Recent Labs   Lab 02/13/22  1328 02/15/22  0944   ALKPHOS 87 76   ALT 54* 38   AST 27 26   ALBUMIN 3.7 3.2*   PROT 7.8 7.2   BILITOT 0.6 0.7        Recent Labs     02/13/22 1328  02/13/22  1328 02/13/22  2116 02/14/22  0101 02/14/22  0347   DDIMER 0.77*  --   --  1.27*  --    BNP 39   < >  --   --  30   TROPONINI 0.022  --  0.032*  --   --     < > = values in this interval not displayed.       All labs within the last 24 hours were reviewed.     Microbiology:  Microbiology Results (last 7 days)     ** No results found for the last 168 hours. **            Imaging  ECG Results          EKG 12-lead (Final result)  Result time 02/14/22 11:42:44    Final result by Interface, Lab In [facility] (02/14/22 11:42:44)                 Narrative:    Test Reason : R00.0,    Vent. Rate : 114 BPM     Atrial Rate : 114 BPM     P-R Int : 146 ms          QRS Dur : 086 ms      QT Int : 332 ms       P-R-T Axes : 047 062 -05 degrees     QTc Int : 457 ms    Sinus tachycardia  Nonspecific ST and T wave abnormality  Abnormal ECG  When compared with ECG of 13-FEB-2022 14:07,  T wave inversion now evident in Inferior leads  Confirmed by GAUTAM BEE MD ([id number]) on 2/14/2022 11:42:33 AM    Referred By: YAMILET ANDERSON           Confirmed By:GAUTAM BEE MD                              No results found for this or any previous visit.      CTA Chest Non-Coronary (PE Study)  Narrative: EXAMINATION:  CTA CHEST NON CORONARY    CLINICAL HISTORY:  Pulmonary embolism (PE) suspected, high prob;    TECHNIQUE:  Low dose axial images, sagittal and coronal reformations were obtained from the thoracic inlet to the lung bases following the IV administration of 100 mL of Omnipaque 350.  Contrast timing was optimized to evaluate the pulmonary arteries.  MIP images were performed.    COMPARISON:  None    FINDINGS:  Pulmonary arteries:Pulmonary arteries are suboptimally enhanced.No filling defects to indicate pulmonary embolism.  Limited visualization of segmental and subsegmental pulmonary arteries.    Thoracic soft tissues: Unremarkable.    Aorta: Left-sided aortic arch.  No aneurysm or dissection.    Heart: Normal size. No  effusion.    Sallie/Mediastinum: No pathologic jonny enlargement.    Airways: Patent.    Lungs/Pleura: Mild peripheral patchy alveolar infiltrate suspicious for viral/COVID pneumonia.  No mass or lobar consolidation.  No effusion or pneumothorax.    Esophagus: Unremarkable.    Upper Abdomen: No abnormality of the partially imaged upper abdomen.    Bones: No acute fracture. No suspicious lytic or sclerotic lesions.  Impression: 1. No evidence of pulmonary embolism.  There is limited visualization of segmental and subsegmental pulmonary arteries with suboptimal pulmonary artery enhancement.  Recommend follow-up as clinically indicated.  2. Mild bilateral peripheral patchy alveolar infiltrates suspicious for viral/COVID pneumonia.  Recommend clinical correlation and follow-up.  3.  This report was flagged in Epic as abnormal.    Electronically signed by: Ken Villasenor  Date:    02/13/2022  Time:    21:37  US Lower Extremity Veins Bilateral  Narrative: EXAMINATION:  US LOWER EXTREMITY VEINS BILATERAL    CLINICAL HISTORY:  Other specified abnormal findings of blood chemistry history of DVT    TECHNIQUE:  Duplex and color flow Doppler and dynamic compression was performed of the bilateral lower extremity veins was performed.  Study limited by body habitus    COMPARISON:  02/07/2022    FINDINGS:  Right thigh veins: The common femoral, femoral,  upper greater saphenous, and deep femoral veins are patent and free of thrombus. The veins are normally compressible and have normal phasic flow and augmentation response.  The right popliteal vein is incompressible and there is nonocclusive DVT within it similar to the previous study    Right calf veins: The visualized calf veins are patent.  There is relatively poor visualization of the peroneal vein and it is difficult to assess whether or not the previously noted DVT has resolved.    Left thigh veins: The common femoral, femoral, popliteal, upper greater saphenous, and deep  femoral veins are patent and free of thrombus. The veins are normally compressible and have normal phasic flow and augmentation response.  The DVT noted previously in the femoral, popliteal veins is not clearly identified currently.    Left calf veins: The visualized calf veins are patent.  Poor visualization of peroneal vein, difficult to assess for residual DVT.    Miscellaneous: None  Impression: Limited study.  No new DVT is identified.  Nonocclusive DVT in the right popliteal vein is similar to the previous exam.  DVT previously described in the left thigh is not identified currently.    Electronically signed by: Pam Downing  Date:    02/13/2022  Time:    15:35  X-Ray Chest AP Portable  Narrative: EXAMINATION:  XR CHEST AP PORTABLE    CLINICAL HISTORY:  Shortness of breath    TECHNIQUE:  Single frontal view of the chest was performed.    COMPARISON:  02/06/2022    FINDINGS:  A portable view of the chest was obtained and is significantly limited by body habitus and portable technique.  The cardiomediastinal silhouette is stable.  The lungs are clear.  There is no pleural effusion.  There are several external artifacts.  Impression: Limited study, grossly negative.    Electronically signed by: Pam Downing  Date:    02/13/2022  Time:    14:35      All imaging within the last 24 hours was reviewed.       Discharge Planning   GREY: 2/18/2022     Code Status: Full Code   Is the patient medically ready for discharge?:     Reason for patient still in hospital (select all that apply): Patient unstable, Patient trending condition and Treatment  Discharge Plan A: Home with family

## 2022-02-15 NOTE — PROGRESS NOTES
Hospital Medicine  Progress note    Team: [facility] [place] [facility] Batsheva Weeks MD  Admit Date: 2/13/2022  GREY 2/18/2022  Code status: Full Code    Principal Problem:  COVID-19    Interval hx:  No complaints. Breathing improved.     ROS   Respiratory: neg for cough neg for shortness of breath  Cardiovascular: neg for chest pain neg for palpitations  Gastrointestinal: neg for nausea neg for vomiting, neg for abdominal pain neg for diarrhea neg for constipation   Behavioral/Psych: neg for depression neg for anxiety    PEx  Temp:  [97.1 °F (36.2 °C)-99.2 °F (37.3 °C)]   Pulse:  []   Resp:  [18-30]   BP: (127-156)/()   SpO2:  [95 %-100 %]   No intake or output data in the 24 hours ending 02/14/22 1855    General Appearance: no acute distress, obese  Heart: regular rate and rhythm, no heave  Respiratory: Normal respiratory effort, symmetric excursion, bilateral vesicular breath sounds   Abdomen: Soft, non-tender; bowel sounds active  Skin: intact, no rash, no ulcers  Neurologic:  No focal numbness or weakness  Mental status: Alert, oriented x 4, affect appropriate     Recent Labs   Lab 02/09/22  0630 02/13/22  1328 02/14/22  0347   WBC 13.73* 6.60 5.61   HGB 13.7* 14.0 13.1*   HCT 43.9 44.4 42.2    185 182     Recent Labs   Lab 02/13/22  1328      K 4.3      CO2 28   BUN 10   CREATININE 1.3   *   CALCIUM 9.5     Recent Labs   Lab 02/13/22  1328   ALKPHOS 87   ALT 54*   AST 27   ALBUMIN 3.7   PROT 7.8   BILITOT 0.6        Recent Labs   Lab 02/08/22  0626   POCTGLUCOSE 123*       Scheduled Meds:   albuterol  2 puff Inhalation Q6H    apixaban  10 mg Oral BID    [START ON 2/16/2022] apixaban  5 mg Oral BID    ascorbic acid (vitamin C)  500 mg Oral BID    baclofen  40 mg Oral BID    fluticasone furoate-vilanteroL  1 puff Inhalation Daily    fluticasone propionate  1 spray Each Nostril Daily    gabapentin  600 mg Oral TID    meloxicam  15 mg Oral Daily    montelukast  10 mg Oral QHS     multivitamin  1 tablet Oral Daily    pantoprazole  40 mg Oral Daily    [START ON 2/15/2022] remdesivir infusion  100 mg Intravenous Daily    traZODone  100 mg Oral Nightly     Continuous Infusions:  As Needed:  acetaminophen, benzonatate, dextrose 10%, dextrose 10%, glucagon (human recombinant), glucose, glucose, HYDROcodone-acetaminophen, loperamide, melatonin, prochlorperazine, senna-docusate 8.6-50 mg, sodium chloride 0.9%    Assessment and Plan  / Problems managed today    * COVID-19  Not hypoxic. CT scan showed viral pneumonia.     Patient is identified as High risk for severe complications of COVID 19 based on COVID risk score of 3   Initiate standard COVID protocols; COVID-19 testing Collection Date: 2/7/2022 Collection Time:   3:48 AM ,Infection Control notification  and isolation- respiratory, contact and droplet per protocol    Diagnostics: (leukopenia, hyponatremia, hyperferritinemia, elevated troponin, elevated d-dimer, age, and comorbidities are significant predictors of poor clinical outcome)  CBC, CMP, Procalcitonin, Ferritin, CRP, BNP and Portable CXR    Management: Initiate targeted therapy with Remdesivir, 200mg IV x1, followed by 100mg IV daily x5 days total, Dexamethasone PO/IV 6mg daily x10 days and Anticoagulation, Patient with contraindication to anticoagulation- already on apixaban, Inhaled bronchodilators as needed for shortness of breath. and Continuous cardiac monitoring.    Advance Care Planning  Current advance care plan has not been discussed with patient/family/POA and patient currently wishes Full Code.    Chronic bronchitis  Will replace his Symbicort with Breo while in the hospital.  Will use inhalers as per COVID order set, and if needed can order nebulizer treatments.  Unclear if he has had formal PFTs.  He has a smoking history, but is unclear to what extent his morbid obesity make be contributing to his respiratory difficulties.    Acute deep vein thrombosis (DVT) of both  lower extremities  Still on Eliquis initiation at 10 mg b.i.d. through Tuesday, when he will step down to 5 mg b.i.d..    Class 3 severe obesity due to excess calories with serious comorbidity and body mass index (BMI) greater than or equal to 70 in adult  Body mass index is 72.7 kg/m². Morbid obesity complicates all aspects of disease management from diagnostic modalities to treatment. Weight loss encouraged and health benefits explained to patient.   This presents his most significant risk factor for severe COVID-19 illness, along with his pulmonary issues.    Diet:  regular diet  GI PPx: protonix  DVT PPx:  apixban  Airways: room air  Wounds: none    Goals of Care:  Return to prior functional status    Discharge plan: home    Time (minutes) spent in care of the patient (Greater than 1/2 spent in direct face-to-face contact)  35 min    Batsheva Weeks MD

## 2022-02-16 LAB
ALBUMIN SERPL BCP-MCNC: 3.4 G/DL (ref 3.5–5.2)
ALP SERPL-CCNC: 82 U/L (ref 55–135)
ALT SERPL W/O P-5'-P-CCNC: 36 U/L (ref 10–44)
ANION GAP SERPL CALC-SCNC: 10 MMOL/L (ref 8–16)
AST SERPL-CCNC: 30 U/L (ref 10–40)
BILIRUB SERPL-MCNC: 0.5 MG/DL (ref 0.1–1)
BUN SERPL-MCNC: 14 MG/DL (ref 6–20)
CALCIUM SERPL-MCNC: 9.4 MG/DL (ref 8.7–10.5)
CHLORIDE SERPL-SCNC: 103 MMOL/L (ref 95–110)
CO2 SERPL-SCNC: 27 MMOL/L (ref 23–29)
CREAT SERPL-MCNC: 1 MG/DL (ref 0.5–1.4)
CRP SERPL-MCNC: 44.1 MG/L (ref 0–8.2)
EST. GFR  (AFRICAN AMERICAN): >60 ML/MIN/1.73 M^2
EST. GFR  (NON AFRICAN AMERICAN): >60 ML/MIN/1.73 M^2
GLUCOSE SERPL-MCNC: 93 MG/DL (ref 70–110)
POCT GLUCOSE: 116 MG/DL (ref 70–110)
POTASSIUM SERPL-SCNC: 4.2 MMOL/L (ref 3.5–5.1)
PROT SERPL-MCNC: 8 G/DL (ref 6–8.4)
SODIUM SERPL-SCNC: 140 MMOL/L (ref 136–145)

## 2022-02-16 PROCEDURE — 87077 CULTURE AEROBIC IDENTIFY: CPT | Performed by: INTERNAL MEDICINE

## 2022-02-16 PROCEDURE — 63600175 PHARM REV CODE 636 W HCPCS: Performed by: HOSPITALIST

## 2022-02-16 PROCEDURE — 27100098 HC SPACER

## 2022-02-16 PROCEDURE — 87186 SC STD MICRODIL/AGAR DIL: CPT | Performed by: INTERNAL MEDICINE

## 2022-02-16 PROCEDURE — 27000190 HC CPAP FULL FACE MASK W/VALVE

## 2022-02-16 PROCEDURE — 94640 AIRWAY INHALATION TREATMENT: CPT

## 2022-02-16 PROCEDURE — 12000002 HC ACUTE/MED SURGE SEMI-PRIVATE ROOM

## 2022-02-16 PROCEDURE — 87205 SMEAR GRAM STAIN: CPT | Performed by: INTERNAL MEDICINE

## 2022-02-16 PROCEDURE — 99232 SBSQ HOSP IP/OBS MODERATE 35: CPT | Mod: 95,,, | Performed by: INTERNAL MEDICINE

## 2022-02-16 PROCEDURE — 94761 N-INVAS EAR/PLS OXIMETRY MLT: CPT

## 2022-02-16 PROCEDURE — 63600175 PHARM REV CODE 636 W HCPCS: Performed by: INTERNAL MEDICINE

## 2022-02-16 PROCEDURE — 63700000 PHARM REV CODE 250 ALT 637 W/O HCPCS: Performed by: INTERNAL MEDICINE

## 2022-02-16 PROCEDURE — 87070 CULTURE OTHR SPECIMN AEROBIC: CPT | Performed by: INTERNAL MEDICINE

## 2022-02-16 PROCEDURE — 86140 C-REACTIVE PROTEIN: CPT | Performed by: INTERNAL MEDICINE

## 2022-02-16 PROCEDURE — 99900035 HC TECH TIME PER 15 MIN (STAT)

## 2022-02-16 PROCEDURE — 80053 COMPREHEN METABOLIC PANEL: CPT | Performed by: INTERNAL MEDICINE

## 2022-02-16 PROCEDURE — 94799 UNLISTED PULMONARY SVC/PX: CPT

## 2022-02-16 PROCEDURE — 25000003 PHARM REV CODE 250: Performed by: INTERNAL MEDICINE

## 2022-02-16 PROCEDURE — 27000221 HC OXYGEN, UP TO 24 HOURS

## 2022-02-16 PROCEDURE — 25000003 PHARM REV CODE 250: Performed by: HOSPITALIST

## 2022-02-16 PROCEDURE — 27000207 HC ISOLATION

## 2022-02-16 PROCEDURE — 94660 CPAP INITIATION&MGMT: CPT

## 2022-02-16 PROCEDURE — 99232 PR SUBSEQUENT HOSPITAL CARE,LEVL II: ICD-10-PCS | Mod: 95,,, | Performed by: INTERNAL MEDICINE

## 2022-02-16 RX ORDER — HYDRALAZINE HYDROCHLORIDE 25 MG/1
25 TABLET, FILM COATED ORAL EVERY 6 HOURS PRN
Status: DISCONTINUED | OUTPATIENT
Start: 2022-02-16 | End: 2022-02-18 | Stop reason: HOSPADM

## 2022-02-16 RX ADMIN — SUCRALFATE 1 G: 1 TABLET ORAL at 04:02

## 2022-02-16 RX ADMIN — FLUTICASONE FUROATE AND VILANTEROL TRIFENATATE 1 PUFF: 100; 25 POWDER RESPIRATORY (INHALATION) at 07:02

## 2022-02-16 RX ADMIN — AZITHROMYCIN MONOHYDRATE 500 MG: 250 TABLET ORAL at 08:02

## 2022-02-16 RX ADMIN — GABAPENTIN 600 MG: 300 CAPSULE ORAL at 08:02

## 2022-02-16 RX ADMIN — FLUTICASONE PROPIONATE 50 MCG: 50 SPRAY, METERED NASAL at 08:02

## 2022-02-16 RX ADMIN — SUCRALFATE 1 G: 1 TABLET ORAL at 11:02

## 2022-02-16 RX ADMIN — THERA TABS 1 TABLET: TAB at 08:02

## 2022-02-16 RX ADMIN — PANTOPRAZOLE SODIUM 40 MG: 40 TABLET, DELAYED RELEASE ORAL at 08:02

## 2022-02-16 RX ADMIN — GUAIFENESIN AND DEXTROMETHORPHAN 10 ML: 100; 10 SYRUP ORAL at 08:02

## 2022-02-16 RX ADMIN — PROMETHAZINE HYDROCHLORIDE AND CODEINE PHOSPHATE 2.5 ML: 10; 6.25 SOLUTION ORAL at 06:02

## 2022-02-16 RX ADMIN — ALBUTEROL SULFATE 2 PUFF: 108 INHALANT RESPIRATORY (INHALATION) at 07:02

## 2022-02-16 RX ADMIN — DIPHENHYDRAMINE HYDROCHLORIDE 10 ML: 25 SOLUTION ORAL at 08:02

## 2022-02-16 RX ADMIN — APIXABAN 5 MG: 5 TABLET, FILM COATED ORAL at 08:02

## 2022-02-16 RX ADMIN — SUCRALFATE 1 G: 1 TABLET ORAL at 06:02

## 2022-02-16 RX ADMIN — ALBUTEROL SULFATE 2 PUFF: 108 INHALANT RESPIRATORY (INHALATION) at 12:02

## 2022-02-16 RX ADMIN — ALBUTEROL SULFATE 2 PUFF: 108 INHALANT RESPIRATORY (INHALATION) at 01:02

## 2022-02-16 RX ADMIN — GUAIFENESIN AND DEXTROMETHORPHAN 10 ML: 100; 10 SYRUP ORAL at 04:02

## 2022-02-16 RX ADMIN — CEFTRIAXONE 2 G: 2 INJECTION, SOLUTION INTRAVENOUS at 04:02

## 2022-02-16 RX ADMIN — OXYCODONE HYDROCHLORIDE AND ACETAMINOPHEN 500 MG: 500 TABLET ORAL at 08:02

## 2022-02-16 RX ADMIN — BACLOFEN 40 MG: 10 TABLET ORAL at 08:02

## 2022-02-16 RX ADMIN — PROMETHAZINE HYDROCHLORIDE AND CODEINE PHOSPHATE 2.5 ML: 10; 6.25 SOLUTION ORAL at 11:02

## 2022-02-16 RX ADMIN — REMDESIVIR 100 MG: 100 INJECTION, POWDER, LYOPHILIZED, FOR SOLUTION INTRAVENOUS at 08:02

## 2022-02-16 RX ADMIN — DEXAMETHASONE 6 MG: 4 TABLET ORAL at 08:02

## 2022-02-16 RX ADMIN — MONTELUKAST 10 MG: 10 TABLET, FILM COATED ORAL at 08:02

## 2022-02-16 RX ADMIN — GABAPENTIN 600 MG: 300 CAPSULE ORAL at 04:02

## 2022-02-16 RX ADMIN — SUCRALFATE 1 G: 1 TABLET ORAL at 08:02

## 2022-02-16 RX ADMIN — TRAZODONE HYDROCHLORIDE 100 MG: 100 TABLET ORAL at 08:02

## 2022-02-16 RX ADMIN — HYDROCODONE BITARTRATE AND ACETAMINOPHEN 1 TABLET: 10; 325 TABLET ORAL at 08:02

## 2022-02-16 NOTE — PROGRESS NOTES
[facility] - Intensive Care ([place] [id number])  [facility] Medicine  Telemedicine Progress Note    Patient Name: Marcello Fernandez  MRN: [id number]  Patient Class: IP- Inpatient   Admission Date: 2/13/2022  Length of Stay: 2 days  Attending Physician: Jaki Lopez MD  Primary Care Provider: Aden Rabago MD          Subjective:     Principal Problem:COVID-19        HPI:  41-year-old male with morbid obesity and COPD transferred from [facility] ED for a CTA due to exceeding size limits for the scanner at that facility.  He had presented there due to worsening dyspnea and cough productive of blood flecked sputum.  He tested positive for COVID-19 on 2/3 at [facility] and due to progressive dyspnea and pain in his legs he presented to [facility] on 02/06 and was diagnosed with bilateral DVTs.  He was admitted the hospital and started on anticoagulation; initially a heparin drip which was transitioned to Eliquis prior to discharge on 02/09.  He was given dexamethasone for COVID-19 during that hospitalization despite having a normal chest x-ray and no hypoxemia.  Since leaving the hospital his exertional dyspnea has increased.  He wears CPAP at night but lately he has been taking it off at night because he feels like he is not getting enough air to breathe when wearing it.      Overview/Hospital Course:  No notes on file      This encounter was provided through telemedicine.  Patient was transferred to the telemedicine service on:  02/15/2022   The [facility] is: [facility] admitted 2/13/2022  7:41 PM.  Present with the patient at the time of the telemed/virtual assessment: n/a    Interval History/Overnight Events:   Clinical record since admit reviewed.    About 10 episodes of hemoptysis before lunch and would fill bottom of cup; slowed after lunch -     Tessalon perles exacerbate cough; + fever overnight; feeling stronger today and more active; has been having indigestion with eating and taking  meds; suffers with reflux at home; + diarrhea today; only had CPAP 2 weeks at home and usually has to remove during the night; no taste/smell changes      Review of Systems   Constitutional: Positive for fatigue and fever.   HENT: Negative for sore throat.    Respiratory: Positive for cough and shortness of breath.    Cardiovascular: Negative for chest pain.   Gastrointestinal: Positive for diarrhea. Negative for vomiting.   Musculoskeletal: Positive for myalgias.        Inpatient Medications:  Scheduled Meds:   albuterol  2 puff Inhalation Q6H    apixaban  10 mg Oral BID    [START ON 2/16/2022] apixaban  5 mg Oral BID    ascorbic acid (vitamin C)  500 mg Oral BID    baclofen  40 mg Oral BID    dexAMETHasone  6 mg Oral Daily    fluticasone furoate-vilanteroL  1 puff Inhalation Daily    fluticasone propionate  1 spray Each Nostril Daily    gabapentin  600 mg Oral TID    meloxicam  15 mg Oral Daily    montelukast  10 mg Oral QHS    multivitamin  1 tablet Oral Daily    pantoprazole  40 mg Oral Daily    remdesivir infusion  100 mg Intravenous Daily    traZODone  100 mg Oral Nightly     Continuous Infusions:  PRN Meds:.acetaminophen, benzonatate, dextrose 10%, dextrose 10%, glucagon (human recombinant), glucose, glucose, HYDROcodone-acetaminophen, loperamide, melatonin, prochlorperazine, senna-docusate 8.6-50 mg, sodium chloride 0.9%      Objective:     Temp:  [98.4 °F (36.9 °C)-101.1 °F (38.4 °C)] 99 °F (37.2 °C)  Pulse:  [] 101  Resp:  [15-22] 15  SpO2:  [91 %-97 %] 96 %  BP: (132-148)/(62-91) 133/78    No intake or output data in the 24 hours ending 02/15/22 1309     Body mass index is 72.7 kg/m².    Physical Exam  Vitals and nursing note reviewed.   Constitutional:       General: He is not in acute distress.     Appearance: He is obese. He is ill-appearing.   HENT:      Head: Normocephalic and atraumatic.      Right Ear: Hearing normal.      Left Ear: Hearing normal.      Nose: Nose normal.    Eyes:      General: No scleral icterus.        Right eye: No discharge.         Left eye: No discharge.      Extraocular Movements: Extraocular movements intact.   Cardiovascular:      Rate and Rhythm: Normal rate.   Pulmonary:      Effort: Pulmonary effort is normal. No accessory muscle usage or respiratory distress.   Skin:     Findings: No rash.   Neurological:      General: No focal deficit present.      Mental Status: He is alert and oriented to person, place, and time.      Cranial Nerves: No cranial nerve deficit.      Motor: No weakness.   Psychiatric:         Attention and Perception: Attention normal.         Mood and Affect: Affect is flat.         Behavior: Behavior is cooperative.         Thought Content: Thought content normal.          Labs:  Recent Results (from the past 24 hour(s))   CBC with Automated Differential    Collection Time: 02/15/22  5:47 AM   Result Value Ref Range    WBC 7.03 3.90 - 12.70 K/uL    RBC 4.97 4.60 - 6.20 M/uL    Hemoglobin 13.8 (L) 14.0 - 18.0 g/dL    Hematocrit 44.1 40.0 - 54.0 %    MCV 89 82 - 98 fL    MCH 27.8 27.0 - 31.0 pg    MCHC 31.3 (L) 32.0 - 36.0 g/dL    RDW 13.9 11.5 - 14.5 %    Platelets 184 150 - 450 K/uL    MPV 11.4 9.2 - 12.9 fL    Immature Granulocytes 0.4 0.0 - 0.5 %    Gran # (ANC) 4.8 1.8 - 7.7 K/uL    Immature Grans (Abs) 0.03 0.00 - 0.04 K/uL    Lymph # 1.7 1.0 - 4.8 K/uL    Mono # 0.5 0.3 - 1.0 K/uL    Eos # 0.0 0.0 - 0.5 K/uL    Baso # 0.02 0.00 - 0.20 K/uL    nRBC 0 0 /100 WBC    Gran % 67.8 38.0 - 73.0 %    Lymph % 23.9 18.0 - 48.0 %    Mono % 7.5 4.0 - 15.0 %    Eosinophil % 0.1 0.0 - 8.0 %    Basophil % 0.3 0.0 - 1.9 %    Differential Method Automated    Comprehensive metabolic panel    Collection Time: 02/15/22  9:44 AM   Result Value Ref Range    Sodium 136 136 - 145 mmol/L    Potassium 4.4 3.5 - 5.1 mmol/L    Chloride 98 95 - 110 mmol/L    CO2 26 23 - 29 mmol/L    Glucose 195 (H) 70 - 110 mg/dL    BUN 14 6 - 20 mg/dL    Creatinine 1.2 0.5 -  1.4 mg/dL    Calcium 8.7 8.7 - 10.5 mg/dL    Total Protein 7.2 6.0 - 8.4 g/dL    Albumin 3.2 (L) 3.5 - 5.2 g/dL    Total Bilirubin 0.7 0.1 - 1.0 mg/dL    Alkaline Phosphatase 76 55 - 135 U/L    AST 26 10 - 40 U/L    ALT 38 10 - 44 U/L    Anion Gap 12 8 - 16 mmol/L    eGFR if African American >60.0 >60 mL/min/1.73 m^2    eGFR if non African American >60.0 >60 mL/min/1.73 m^2   Magnesium    Collection Time: 02/15/22  9:44 AM   Result Value Ref Range    Magnesium 1.7 1.6 - 2.6 mg/dL   Phosphorus    Collection Time: 02/15/22  9:44 AM   Result Value Ref Range    Phosphorus 3.2 2.7 - 4.5 mg/dL        Lab Results   Component Value Date    [id number] Positive (A) 02/13/2022       Recent Labs   Lab 02/13/22  1328 02/13/22  1328 02/14/22  0347 02/15/22  0547   WBC 6.60  --  5.61 7.03   LYMPH 20.6  1.4   < > 14.4*  0.8* 23.9  1.7   HGB 14.0  --  13.1* 13.8*   HCT 44.4  --  42.2 44.1     --  182 184    < > = values in this interval not displayed.     Recent Labs   Lab 02/13/22  1328 02/15/22  0944    136   K 4.3 4.4    98   CO2 28 26   BUN 10 14   CREATININE 1.3 1.2   * 195*   CALCIUM 9.5 8.7   MG  --  1.7   PHOS  --  3.2     Recent Labs   Lab 02/13/22  1328 02/15/22  0944   ALKPHOS 87 76   ALT 54* 38   AST 27 26   ALBUMIN 3.7 3.2*   PROT 7.8 7.2   BILITOT 0.6 0.7        Recent Labs     02/13/22  1328 02/13/22  1328 02/13/22  2116 02/14/22  0101 02/14/22  0347   DDIMER 0.77*  --   --  1.27*  --    BNP 39   < >  --   --  30   TROPONINI 0.022  --  0.032*  --   --     < > = values in this interval not displayed.       All labs within the last 24 hours were reviewed.     Microbiology:  Microbiology Results (last 7 days)     ** No results found for the last 168 hours. **            Imaging  ECG Results          EKG 12-lead (Final result)  Result time 02/14/22 11:42:44    Final result by Interface, Lab In [facility] (02/14/22 11:42:44)                 Narrative:    Test Reason : R00.0,    Vent. Rate : 114  BPM     Atrial Rate : 114 BPM     P-R Int : 146 ms          QRS Dur : 086 ms      QT Int : 332 ms       P-R-T Axes : 047 062 -05 degrees     QTc Int : 457 ms    Sinus tachycardia  Nonspecific ST and T wave abnormality  Abnormal ECG  When compared with ECG of 13-FEB-2022 14:07,  T wave inversion now evident in Inferior leads  Confirmed by GAUTAM BEE MD ([id number]) on 2/14/2022 11:42:33 AM    Referred By: YAMILET ANDERSON           Confirmed By:GAUTAM BEE MD                              No results found for this or any previous visit.      CTA Chest Non-Coronary (PE Study)  Narrative: EXAMINATION:  CTA CHEST NON CORONARY    CLINICAL HISTORY:  Pulmonary embolism (PE) suspected, high prob;    TECHNIQUE:  Low dose axial images, sagittal and coronal reformations were obtained from the thoracic inlet to the lung bases following the IV administration of 100 mL of Omnipaque 350.  Contrast timing was optimized to evaluate the pulmonary arteries.  MIP images were performed.    COMPARISON:  None    FINDINGS:  Pulmonary arteries:Pulmonary arteries are suboptimally enhanced.No filling defects to indicate pulmonary embolism.  Limited visualization of segmental and subsegmental pulmonary arteries.    Thoracic soft tissues: Unremarkable.    Aorta: Left-sided aortic arch.  No aneurysm or dissection.    Heart: Normal size. No effusion.    Sallie/Mediastinum: No pathologic jonny enlargement.    Airways: Patent.    Lungs/Pleura: Mild peripheral patchy alveolar infiltrate suspicious for viral/COVID pneumonia.  No mass or lobar consolidation.  No effusion or pneumothorax.    Esophagus: Unremarkable.    Upper Abdomen: No abnormality of the partially imaged upper abdomen.    Bones: No acute fracture. No suspicious lytic or sclerotic lesions.  Impression: 1. No evidence of pulmonary embolism.  There is limited visualization of segmental and subsegmental pulmonary arteries with suboptimal pulmonary artery enhancement.  Recommend follow-up as  clinically indicated.  2. Mild bilateral peripheral patchy alveolar infiltrates suspicious for viral/COVID pneumonia.  Recommend clinical correlation and follow-up.  3.  This report was flagged in Epic as abnormal.    Electronically signed by: Ken Villasenor  Date:    02/13/2022  Time:    21:37  US Lower Extremity Veins Bilateral  Narrative: EXAMINATION:  US LOWER EXTREMITY VEINS BILATERAL    CLINICAL HISTORY:  Other specified abnormal findings of blood chemistry history of DVT    TECHNIQUE:  Duplex and color flow Doppler and dynamic compression was performed of the bilateral lower extremity veins was performed.  Study limited by body habitus    COMPARISON:  02/07/2022    FINDINGS:  Right thigh veins: The common femoral, femoral,  upper greater saphenous, and deep femoral veins are patent and free of thrombus. The veins are normally compressible and have normal phasic flow and augmentation response.  The right popliteal vein is incompressible and there is nonocclusive DVT within it similar to the previous study    Right calf veins: The visualized calf veins are patent.  There is relatively poor visualization of the peroneal vein and it is difficult to assess whether or not the previously noted DVT has resolved.    Left thigh veins: The common femoral, femoral, popliteal, upper greater saphenous, and deep femoral veins are patent and free of thrombus. The veins are normally compressible and have normal phasic flow and augmentation response.  The DVT noted previously in the femoral, popliteal veins is not clearly identified currently.    Left calf veins: The visualized calf veins are patent.  Poor visualization of peroneal vein, difficult to assess for residual DVT.    Miscellaneous: None  Impression: Limited study.  No new DVT is identified.  Nonocclusive DVT in the right popliteal vein is similar to the previous exam.  DVT previously described in the left thigh is not identified currently.    Electronically signed  by: Pam Downing  Date:    02/13/2022  Time:    15:35  X-Ray Chest AP Portable  Narrative: EXAMINATION:  XR CHEST AP PORTABLE    CLINICAL HISTORY:  Shortness of breath    TECHNIQUE:  Single frontal view of the chest was performed.    COMPARISON:  02/06/2022    FINDINGS:  A portable view of the chest was obtained and is significantly limited by body habitus and portable technique.  The cardiomediastinal silhouette is stable.  The lungs are clear.  There is no pleural effusion.  There are several external artifacts.  Impression: Limited study, grossly negative.    Electronically signed by: Pam Downing  Date:    02/13/2022  Time:    14:35      All imaging within the last 24 hours was reviewed.       Discharge Planning   GREY: 2/18/2022     Code Status: Full Code   Is the patient medically ready for discharge?:     Reason for patient still in hospital (select all that apply): Patient unstable, Patient trending condition and Treatment  Discharge Plan A: Home with family            Assessment/Plan:      * COVID-19  Oxygen placed once SpO2 at 91%; CT scan showed viral pneumonia; patient with hemoptysis 2/15 (also at home); respiratory cx ordered; empiric Abx with rocephin and zithromax    Patient is identified as High risk for severe complications of COVID 19 based on COVID risk score of 3   Initiate standard COVID protocols; COVID-19 testing Collection Date: 2/7/2022 Collection Time:   3:48 AM ,Infection Control notification  and isolation- respiratory, contact and droplet per protocol    Diagnostics: (leukopenia, hyponatremia, hyperferritinemia, elevated troponin, elevated d-dimer, age, and comorbidities are significant predictors of poor clinical outcome)  CBC, CMP, Procalcitonin, Ferritin, CRP, BNP and Portable CXR    Management: Initiate targeted therapy with Remdesivir, 200mg IV x1, followed by 100mg IV daily x5 days total, Dexamethasone PO/IV 6mg daily x10 days and Anticoagulation, Patient with  contraindication to anticoagulation- already on apixaban, Inhaled bronchodilators as needed for shortness of breath. and Continuous cardiac monitoring.    Advance Care Planning  Current advance care plan has not been discussed with patient/family/POA and patient currently wishes Full Code.     Will consider discharging after 3 days of remdesivir.    Obstructive sleep apnea on CPAP  -continue CPAP nightly  -patient encouraged to f/u with his sleep MD if he continues to have difficulty tolerating      Chronic bronchitis  Will replace his Symbicort with Breo while in the hospital.  Will use inhalers as per COVID order set, and if needed can order nebulizer treatments.  Unclear if he has had formal PFTs.  He has a smoking history, but is unclear to what extent his morbid obesity make be contributing to his respiratory difficulties.    Acute deep vein thrombosis (DVT) of both lower extremities  Still on Eliquis initiation at 10 mg b.i.d. through 2/15, then he will step down to 5 mg b.i.d..    Sciatica of right side  -continue gabapentin and pain meds      Class 3 severe obesity due to excess calories with serious comorbidity and body mass index (BMI) greater than or equal to 70 in adult  Body mass index is 72.7 kg/m². Morbid obesity complicates all aspects of disease management from diagnostic modalities to treatment. Weight loss encouraged and health benefits explained to patient.   This presents his most significant risk factor for severe COVID-19 illness, along with his pulmonary issues.        VTE Risk Mitigation (From admission, onward)         Ordered     apixaban tablet 5 mg  2 times daily         02/13/22 0447              I have assessed these findings virtually using a telemed platform and with assistance of the bedside nurse.        The attending portion of this evaluation, treatment, and documentation was performed per Jaki Lopez MD via Telemedicine AudioVisual using the secure [employer] platform  with 2 way audio/video. The provider was located off-site and the patient is located in the hospital. The aforementioned video software was utilized to document the relevant history and physical exam  Note:  Secure [employer] software used instead of KO-SU for this encounter.    Jaki Lopez MD  Department of Hospital Medicine   [facility] - Intensive Care ([place]-16)

## 2022-02-16 NOTE — ASSESSMENT & PLAN NOTE
-continue CPAP nightly  -patient encouraged to f/u with his sleep MD if he continues to have difficulty tolerating

## 2022-02-16 NOTE — PLAN OF CARE
Problem: Bariatric Environmental Safety  Goal: Safety Maintained with Care  Outcome: Ongoing, Progressing     Problem: Infection  Goal: Absence of Infection Signs and Symptoms  Outcome: Ongoing, Progressing   Pt free from pain. A/Ox4. VSS. Bed locked and in lowest position. Call light in reach.

## 2022-02-16 NOTE — NURSING
2/16/2022: Pt had a episode of frequent non-productive coughing. Pt voiced schedule cough med does not work. PRN cough med given twice during shift.

## 2022-02-17 PROBLEM — J96.01 ACUTE HYPOXEMIC RESPIRATORY FAILURE: Status: ACTIVE | Noted: 2022-02-17

## 2022-02-17 LAB
ALBUMIN SERPL BCP-MCNC: 3.2 G/DL (ref 3.5–5.2)
ALP SERPL-CCNC: 84 U/L (ref 55–135)
ALT SERPL W/O P-5'-P-CCNC: 37 U/L (ref 10–44)
ANION GAP SERPL CALC-SCNC: 7 MMOL/L (ref 8–16)
AST SERPL-CCNC: 24 U/L (ref 10–40)
BASOPHILS # BLD AUTO: 0.02 K/UL (ref 0–0.2)
BASOPHILS NFR BLD: 0.2 % (ref 0–1.9)
BILIRUB SERPL-MCNC: 0.4 MG/DL (ref 0.1–1)
BUN SERPL-MCNC: 13 MG/DL (ref 6–20)
CALCIUM SERPL-MCNC: 9.1 MG/DL (ref 8.7–10.5)
CHLORIDE SERPL-SCNC: 103 MMOL/L (ref 95–110)
CO2 SERPL-SCNC: 31 MMOL/L (ref 23–29)
CREAT SERPL-MCNC: 1.1 MG/DL (ref 0.5–1.4)
DIFFERENTIAL METHOD: ABNORMAL
EOSINOPHIL # BLD AUTO: 0 K/UL (ref 0–0.5)
EOSINOPHIL NFR BLD: 0.1 % (ref 0–8)
ERYTHROCYTE [DISTWIDTH] IN BLOOD BY AUTOMATED COUNT: 13.4 % (ref 11.5–14.5)
EST. GFR  (AFRICAN AMERICAN): >60 ML/MIN/1.73 M^2
EST. GFR  (NON AFRICAN AMERICAN): >60 ML/MIN/1.73 M^2
GLUCOSE SERPL-MCNC: 145 MG/DL (ref 70–110)
HCT VFR BLD AUTO: 42.4 % (ref 40–54)
HGB BLD-MCNC: 13.6 G/DL (ref 14–18)
IMM GRANULOCYTES # BLD AUTO: 0.03 K/UL (ref 0–0.04)
IMM GRANULOCYTES NFR BLD AUTO: 0.3 % (ref 0–0.5)
LYMPHOCYTES # BLD AUTO: 2 K/UL (ref 1–4.8)
LYMPHOCYTES NFR BLD: 23.6 % (ref 18–48)
MCH RBC QN AUTO: 28.2 PG (ref 27–31)
MCHC RBC AUTO-ENTMCNC: 32.1 G/DL (ref 32–36)
MCV RBC AUTO: 88 FL (ref 82–98)
MONOCYTES # BLD AUTO: 0.6 K/UL (ref 0.3–1)
MONOCYTES NFR BLD: 7.2 % (ref 4–15)
NEUTROPHILS # BLD AUTO: 5.9 K/UL (ref 1.8–7.7)
NEUTROPHILS NFR BLD: 68.6 % (ref 38–73)
NRBC BLD-RTO: 0 /100 WBC
PLATELET # BLD AUTO: 194 K/UL (ref 150–450)
PMV BLD AUTO: 12 FL (ref 9.2–12.9)
POTASSIUM SERPL-SCNC: 4.6 MMOL/L (ref 3.5–5.1)
PROT SERPL-MCNC: 7.3 G/DL (ref 6–8.4)
RBC # BLD AUTO: 4.82 M/UL (ref 4.6–6.2)
SODIUM SERPL-SCNC: 141 MMOL/L (ref 136–145)
WBC # BLD AUTO: 8.63 K/UL (ref 3.9–12.7)

## 2022-02-17 PROCEDURE — 94664 DEMO&/EVAL PT USE INHALER: CPT

## 2022-02-17 PROCEDURE — 99233 SBSQ HOSP IP/OBS HIGH 50: CPT | Mod: 95,,, | Performed by: INTERNAL MEDICINE

## 2022-02-17 PROCEDURE — 25000003 PHARM REV CODE 250: Performed by: INTERNAL MEDICINE

## 2022-02-17 PROCEDURE — 94799 UNLISTED PULMONARY SVC/PX: CPT

## 2022-02-17 PROCEDURE — 25000003 PHARM REV CODE 250: Performed by: HOSPITALIST

## 2022-02-17 PROCEDURE — 27000221 HC OXYGEN, UP TO 24 HOURS

## 2022-02-17 PROCEDURE — 85025 COMPLETE CBC W/AUTO DIFF WBC: CPT | Performed by: INTERNAL MEDICINE

## 2022-02-17 PROCEDURE — 99900035 HC TECH TIME PER 15 MIN (STAT)

## 2022-02-17 PROCEDURE — 36415 COLL VENOUS BLD VENIPUNCTURE: CPT | Performed by: INTERNAL MEDICINE

## 2022-02-17 PROCEDURE — 94660 CPAP INITIATION&MGMT: CPT

## 2022-02-17 PROCEDURE — 27000207 HC ISOLATION

## 2022-02-17 PROCEDURE — 63600175 PHARM REV CODE 636 W HCPCS: Performed by: INTERNAL MEDICINE

## 2022-02-17 PROCEDURE — 94640 AIRWAY INHALATION TREATMENT: CPT

## 2022-02-17 PROCEDURE — 99233 PR SUBSEQUENT HOSPITAL CARE,LEVL III: ICD-10-PCS | Mod: 95,,, | Performed by: INTERNAL MEDICINE

## 2022-02-17 PROCEDURE — 80053 COMPREHEN METABOLIC PANEL: CPT | Performed by: INTERNAL MEDICINE

## 2022-02-17 PROCEDURE — 12000002 HC ACUTE/MED SURGE SEMI-PRIVATE ROOM

## 2022-02-17 PROCEDURE — 63600175 PHARM REV CODE 636 W HCPCS: Performed by: HOSPITALIST

## 2022-02-17 PROCEDURE — 94761 N-INVAS EAR/PLS OXIMETRY MLT: CPT

## 2022-02-17 PROCEDURE — 63700000 PHARM REV CODE 250 ALT 637 W/O HCPCS: Performed by: INTERNAL MEDICINE

## 2022-02-17 PROCEDURE — 27000646 HC AEROBIKA DEVICE

## 2022-02-17 RX ORDER — BENZONATATE 100 MG/1
100 CAPSULE ORAL 3 TIMES DAILY
Status: DISCONTINUED | OUTPATIENT
Start: 2022-02-17 | End: 2022-02-17

## 2022-02-17 RX ADMIN — PROMETHAZINE HYDROCHLORIDE AND CODEINE PHOSPHATE 2.5 ML: 10; 6.25 SOLUTION ORAL at 10:02

## 2022-02-17 RX ADMIN — APIXABAN 5 MG: 5 TABLET, FILM COATED ORAL at 08:02

## 2022-02-17 RX ADMIN — MONTELUKAST 10 MG: 10 TABLET, FILM COATED ORAL at 10:02

## 2022-02-17 RX ADMIN — DIPHENHYDRAMINE HYDROCHLORIDE 10 ML: 25 SOLUTION ORAL at 10:02

## 2022-02-17 RX ADMIN — DEXAMETHASONE 6 MG: 4 TABLET ORAL at 08:02

## 2022-02-17 RX ADMIN — ALBUTEROL SULFATE 2 PUFF: 108 INHALANT RESPIRATORY (INHALATION) at 08:02

## 2022-02-17 RX ADMIN — GUAIFENESIN AND DEXTROMETHORPHAN 10 ML: 100; 10 SYRUP ORAL at 02:02

## 2022-02-17 RX ADMIN — GABAPENTIN 600 MG: 300 CAPSULE ORAL at 08:02

## 2022-02-17 RX ADMIN — BACLOFEN 40 MG: 10 TABLET ORAL at 08:02

## 2022-02-17 RX ADMIN — DIPHENHYDRAMINE HYDROCHLORIDE 10 ML: 25 SOLUTION ORAL at 06:02

## 2022-02-17 RX ADMIN — REMDESIVIR 100 MG: 100 INJECTION, POWDER, LYOPHILIZED, FOR SOLUTION INTRAVENOUS at 10:02

## 2022-02-17 RX ADMIN — OXYCODONE HYDROCHLORIDE AND ACETAMINOPHEN 500 MG: 500 TABLET ORAL at 08:02

## 2022-02-17 RX ADMIN — HYDROCODONE BITARTRATE AND ACETAMINOPHEN 1 TABLET: 10; 325 TABLET ORAL at 05:02

## 2022-02-17 RX ADMIN — FLUTICASONE PROPIONATE 50 MCG: 50 SPRAY, METERED NASAL at 09:02

## 2022-02-17 RX ADMIN — OXYCODONE HYDROCHLORIDE AND ACETAMINOPHEN 500 MG: 500 TABLET ORAL at 10:02

## 2022-02-17 RX ADMIN — ALBUTEROL SULFATE 2 PUFF: 108 INHALANT RESPIRATORY (INHALATION) at 07:02

## 2022-02-17 RX ADMIN — GUAIFENESIN AND DEXTROMETHORPHAN 10 ML: 100; 10 SYRUP ORAL at 10:02

## 2022-02-17 RX ADMIN — APIXABAN 5 MG: 5 TABLET, FILM COATED ORAL at 10:02

## 2022-02-17 RX ADMIN — SUCRALFATE 1 G: 1 TABLET ORAL at 04:02

## 2022-02-17 RX ADMIN — PANTOPRAZOLE SODIUM 40 MG: 40 TABLET, DELAYED RELEASE ORAL at 08:02

## 2022-02-17 RX ADMIN — GABAPENTIN 600 MG: 300 CAPSULE ORAL at 10:02

## 2022-02-17 RX ADMIN — SUCRALFATE 1 G: 1 TABLET ORAL at 06:02

## 2022-02-17 RX ADMIN — MELOXICAM 15 MG: 15 TABLET ORAL at 08:02

## 2022-02-17 RX ADMIN — ALBUTEROL SULFATE 2 PUFF: 108 INHALANT RESPIRATORY (INHALATION) at 12:02

## 2022-02-17 RX ADMIN — CEFTRIAXONE 2 G: 2 INJECTION, SOLUTION INTRAVENOUS at 04:02

## 2022-02-17 RX ADMIN — SUCRALFATE 1 G: 1 TABLET ORAL at 10:02

## 2022-02-17 RX ADMIN — TRAZODONE HYDROCHLORIDE 100 MG: 100 TABLET ORAL at 10:02

## 2022-02-17 RX ADMIN — GABAPENTIN 600 MG: 300 CAPSULE ORAL at 02:02

## 2022-02-17 RX ADMIN — THERA TABS 1 TABLET: TAB at 08:02

## 2022-02-17 RX ADMIN — GUAIFENESIN AND DEXTROMETHORPHAN 10 ML: 100; 10 SYRUP ORAL at 08:02

## 2022-02-17 RX ADMIN — DIPHENHYDRAMINE HYDROCHLORIDE 10 ML: 25 SOLUTION ORAL at 04:02

## 2022-02-17 RX ADMIN — BACLOFEN 40 MG: 10 TABLET ORAL at 10:02

## 2022-02-17 RX ADMIN — AZITHROMYCIN MONOHYDRATE 500 MG: 250 TABLET ORAL at 08:02

## 2022-02-17 RX ADMIN — HYDROCODONE BITARTRATE AND ACETAMINOPHEN 1 TABLET: 10; 325 TABLET ORAL at 11:02

## 2022-02-17 NOTE — PLAN OF CARE
Problem: Adult Inpatient Plan of Care  Goal: Plan of Care Review  Outcome: Ongoing, Progressing     Problem: Bariatric Environmental Safety  Goal: Safety Maintained with Care  Outcome: Ongoing, Progressing     Problem: Infection  Goal: Absence of Infection Signs and Symptoms  Outcome: Ongoing, Progressing     Pt AOx4. No acute events noted during shift. Vitals remained stable. Had c/o pain and discomfort, PRN meds given per MD orders. Pt remains on RA, %. Q1-2hr safety checks completed. Bed remained low, locked, with all personal items in reach.

## 2022-02-17 NOTE — ASSESSMENT & PLAN NOTE
Will replace his Symbicort with Breo while in the hospital.  Will use inhalers as per COVID order set, and if needed can order nebulizer treatments.  Unclear if he has had formal PFTs.  He has a smoking history, but is unclear to what extent his morbid obesity make be contributing to his respiratory difficulties.  -not tolerating breo due to oral soreness; same with symbicort at home;change to flovent at discharge

## 2022-02-17 NOTE — PROGRESS NOTES
Home Oxygen Evaluation    Date Performed: 2022    1) Patient's Home O2 Sat on room air, while at rest: 98        If O2 sats on room air at rest are 88% or below, patient qualifies. No additional testing needed. Document N/A in steps 2 and 3. If 89% or above, complete steps 2.      2) Patient's O2 Sat on room air while exercisin        If O2 sats on room air while exercising remain 89% or above patient does not qualify, no further testing needed Document N/A in step 3. If O2 sats on room air while exercising are 88% or below, continue to step 3.      3) Patient's O2 Sat while exercising on O2: 97 at 2 LPM         (Must show improvement from #2 for patients to qualify)    If O2 sats improve on oxygen, patient qualifies for portable oxygen. If not, the patient does not qualify.

## 2022-02-17 NOTE — SUBJECTIVE & OBJECTIVE
Telemedicine  This service was provided by Virtual Visit.    Patient was transferred to [facility] on:  2/15/2022     Chief Complaint   Patient presents with    Shortness of Breath     Pt is a transfer from [place] for CTA chest. Pt c/o of SOB x1 week.      The patient location is: [id number] A   Admitted 2/13/2022  7:41 PM  Present with the patient at the time of the telemed/virtual assessment: N/A    Interval History / Events Overnight:   The patient is able to provide adequate history. Additional history was obtained from past medical records. No significant events reported by Nursing.  Patient complains of dyspnea. Symptoms have improved since yesterday. Associated symptoms include: shortness of breath on exertion, cough and fatigue. Symptoms are decreasing in both severity and frequency.     Lab test(s) reviewed: H&H stable    Review of Systems   Constitutional: Negative for fever.   Respiratory: Positive for cough (hemoptysis).    Gastrointestinal: Negative for vomiting.     Objective:     Vital Signs (Most Recent):  Temp: 98.1 °F (36.7 °C) (02/17/22 1155)  Pulse: 94 (02/17/22 1217)  Resp: (!) 24 (02/17/22 1217)  BP: (!) 152/81 (02/17/22 1155)  SpO2: 98 % (02/17/22 1427) Vital Signs (24h Range):  Temp:  [97.9 °F (36.6 °C)-98.4 °F (36.9 °C)] 98.1 °F (36.7 °C)  Pulse:  [78-95] 94  Resp:  [16-24] 24  SpO2:  [95 %-99 %] 98 %  BP: (140-152)/(67-82) 152/81     Weight: (!) 223.3 kg (492 lb 4.6 oz)  Body mass index is 72.7 kg/m².  No intake or output data in the 24 hours ending 02/17/22 1516   Physical Exam  Constitutional:       General: He is not in acute distress.     Appearance: Normal appearance. He is morbidly obese.   Eyes:      General: Lids are normal. No scleral icterus.        Right eye: No discharge.         Left eye: No discharge.      Conjunctiva/sclera: Conjunctivae normal.   Neck:      Trachea: Phonation normal.   Cardiovascular:      Rate and Rhythm: Normal rate.      Comments:  Monitor / Vital signs reviewed at time of visit  Pulmonary:      Effort: Pulmonary effort is normal. Tachypnea present. No accessory muscle usage or respiratory distress.   Abdominal:      General: There is no distension.   Neurological:      Mental Status: He is alert. He is not disoriented.   Psychiatric:         Attention and Perception: Attention normal.         Mood and Affect: Affect normal.         Behavior: Behavior is cooperative.         Significant Labs:   Recent Labs   Lab 02/14/22  0347 02/15/22  0547 02/17/22  0415   WBC 5.61 7.03 8.63   HGB 13.1* 13.8* 13.6*   HCT 42.2 44.1 42.4    184 194     Recent Labs   Lab 02/14/22  0347 02/14/22  0347 02/15/22  0547 02/17/22  0415   GRAN 79.3*  4.5   < > 67.8  4.8 68.6  5.9   LYMPH 14.4*  0.8*   < > 23.9  1.7 23.6  2.0   MONO 5.7  0.3   < > 7.5  0.5 7.2  0.6   EOS 0.0  --  0.0 0.0    < > = values in this interval not displayed.     Recent Labs   Lab 02/15/22  0944 02/16/22  0423 02/17/22  0619    140 141   K 4.4 4.2 4.6   CL 98 103 103   CO2 26 27 31*   BUN 14 14 13   CREATININE 1.2 1.0 1.1   * 93 145*   CALCIUM 8.7 9.4 9.1   ALBUMIN 3.2* 3.4* 3.2*   MG 1.7  --   --    PHOS 3.2  --   --      Recent Labs   Lab 02/15/22  0944 02/16/22  0423 02/17/22  0619   ALKPHOS 76 82 84   ALT 38 36 37   AST 26 30 24   PROT 7.2 8.0 7.3   BILITOT 0.7 0.5 0.4     Procalcitonin (ng/mL)   Date Value   02/14/2022 0.08   02/13/2022 0.08     Lactate (Lactic Acid) (mmol/L)   Date Value   02/13/2022 1.3     BNP (pg/mL)   Date Value   02/14/2022 30   02/13/2022 39   02/06/2022 29   12/30/2021 13   12/16/2021 <10     CRP (mg/L)   Date Value   02/16/2022 44.1 (H)     D-Dimer   Date Value   02/15/2022 0.64 mg/L FEU (H)   02/14/2022 1.27 mg/L FEU (H)   02/13/2022 0.77 mg/L FEU (H)   02/06/2022 2.35 mg/L FEU (H)   12/30/2021 1.64 mg/L FEU (H)   12/14/2021 1.46 ug/mL FEU (H)   01/17/2018 0.75 mcg/mL (H)   03/13/2015 454 ng/mL (H)   03/04/2015 437 ng/mL (H)      Troponin I (ng/mL)   Date Value   02/13/2022 0.032 (H)   02/13/2022 0.022   02/06/2022 0.007   12/30/2021 0.009   12/14/2021 <0.012   03/08/2017 <0.012     SARS-CoV2 (COVID-19) Qualitative PCR (no units)   Date Value   02/07/2022 Not Detected     SARS-CoV-2 RNA, Amplification, Qual (no units)   Date Value   02/13/2022 Positive (A)   02/06/2022 Negative   12/30/2021 Negative   12/16/2021 Negative     Microbiology Results (last 7 days)     Procedure Component Value Units Date/Time    Culture, Respiratory with Gram Stain [[id number]] Collected: 02/16/22 1526    Order Status: Completed Specimen: Respiratory from Sputum, Expectorated Updated: 02/16/22 2020     Gram Stain (Respiratory) <10 epithelial cells per low power field.     Gram Stain (Respiratory) Rare WBC's     Gram Stain (Respiratory) Few Gram negative rods        ECG Results          EKG 12-lead (Final result)  Result time 02/14/22 11:42:44    Final result by Interface, Lab In [facility] (02/14/22 11:42:44)                 Narrative:    Test Reason : R00.0,    Vent. Rate : 114 BPM     Atrial Rate : 114 BPM     P-R Int : 146 ms          QRS Dur : 086 ms      QT Int : 332 ms       P-R-T Axes : 047 062 -05 degrees     QTc Int : 457 ms    Sinus tachycardia  Nonspecific ST and T wave abnormality  Abnormal ECG  When compared with ECG of 13-FEB-2022 14:07,  T wave inversion now evident in Inferior leads  Confirmed by GAUTAM BEE MD ([id number]) on 2/14/2022 11:42:33 AM    Referred By: YAMILET ANDERSON           Confirmed By:GAUTAM BEE MD                            CTA Chest Non-Coronary (PE Study)  Narrative: EXAMINATION:  CTA CHEST NON CORONARY    CLINICAL HISTORY:  Pulmonary embolism (PE) suspected, high prob;    TECHNIQUE:  Low dose axial images, sagittal and coronal reformations were obtained from the thoracic inlet to the lung bases following the IV administration of 100 mL of Omnipaque 350.  Contrast timing was optimized to evaluate the pulmonary arteries.  MIP  images were performed.    COMPARISON:  None    FINDINGS:  Pulmonary arteries:Pulmonary arteries are suboptimally enhanced.No filling defects to indicate pulmonary embolism.  Limited visualization of segmental and subsegmental pulmonary arteries.    Thoracic soft tissues: Unremarkable.    Aorta: Left-sided aortic arch.  No aneurysm or dissection.    Heart: Normal size. No effusion.    Sallie/Mediastinum: No pathologic jonny enlargement.    Airways: Patent.    Lungs/Pleura: Mild peripheral patchy alveolar infiltrate suspicious for viral/COVID pneumonia.  No mass or lobar consolidation.  No effusion or pneumothorax.    Esophagus: Unremarkable.    Upper Abdomen: No abnormality of the partially imaged upper abdomen.    Bones: No acute fracture. No suspicious lytic or sclerotic lesions.  Impression: 1. No evidence of pulmonary embolism.  There is limited visualization of segmental and subsegmental pulmonary arteries with suboptimal pulmonary artery enhancement.  Recommend follow-up as clinically indicated.  2. Mild bilateral peripheral patchy alveolar infiltrates suspicious for viral/COVID pneumonia.  Recommend clinical correlation and follow-up.  3.  This report was flagged in Epic as abnormal.    Electronically signed by: Ken Villasenor  Date:    02/13/2022  Time:    21:37  US Lower Extremity Veins Bilateral  Narrative: EXAMINATION:  US LOWER EXTREMITY VEINS BILATERAL    CLINICAL HISTORY:  Other specified abnormal findings of blood chemistry history of DVT    TECHNIQUE:  Duplex and color flow Doppler and dynamic compression was performed of the bilateral lower extremity veins was performed.  Study limited by body habitus    COMPARISON:  02/07/2022    FINDINGS:  Right thigh veins: The common femoral, femoral,  upper greater saphenous, and deep femoral veins are patent and free of thrombus. The veins are normally compressible and have normal phasic flow and augmentation response.  The right popliteal vein is incompressible  and there is nonocclusive DVT within it similar to the previous study    Right calf veins: The visualized calf veins are patent.  There is relatively poor visualization of the peroneal vein and it is difficult to assess whether or not the previously noted DVT has resolved.    Left thigh veins: The common femoral, femoral, popliteal, upper greater saphenous, and deep femoral veins are patent and free of thrombus. The veins are normally compressible and have normal phasic flow and augmentation response.  The DVT noted previously in the femoral, popliteal veins is not clearly identified currently.    Left calf veins: The visualized calf veins are patent.  Poor visualization of peroneal vein, difficult to assess for residual DVT.    Miscellaneous: None  Impression: Limited study.  No new DVT is identified.  Nonocclusive DVT in the right popliteal vein is similar to the previous exam.  DVT previously described in the left thigh is not identified currently.    Electronically signed by: Pam Downing  Date:    02/13/2022  Time:    15:35  X-Ray Chest AP Portable  Narrative: EXAMINATION:  XR CHEST AP PORTABLE    CLINICAL HISTORY:  Shortness of breath    TECHNIQUE:  Single frontal view of the chest was performed.    COMPARISON:  02/06/2022    FINDINGS:  A portable view of the chest was obtained and is significantly limited by body habitus and portable technique.  The cardiomediastinal silhouette is stable.  The lungs are clear.  There is no pleural effusion.  There are several external artifacts.  Impression: Limited study, grossly negative.    Electronically signed by: Pam Downing  Date:    02/13/2022  Time:    14:35      Labs and Imaging within the last 24 hours listed above were reviewed.       Diet: Diet Adult Regular (IDDSI Level 7)  Significant LDAs:   IV Access Type: Peripheral  Urinary Catheter Indication if present: Patient Does Not Have Urinary Catheter  Other Lines/Tubes/Drains:    HIGH RISK  CONDITION(S):   Patient has a condition that poses threat to life and bodily function: Severe Respiratory Distress     Goals of Care:    Previous admission:  2/13/22  Likely prognosis:  Fair  Code Status: Full Code  Comfort Only: No  Hospice: No  Goals at discharge: remain at home, with physician follow-up    Discharge Planning   GREY:      Code Status: Full Code   Is the patient medically ready for discharge?: Yes    Reason for patient still in hospital (select all that apply): Treatment  Discharge Plan A: Home with family

## 2022-02-17 NOTE — PROGRESS NOTES
[facility] - Intensive Care ([place] [address])  [facility] Medicine  Telemedicine Progress Note    Patient Name: Marcello Fernandez  MRN: [id number]  Patient Class: IP- Inpatient   Admission Date: 2/13/2022  Length of Stay: 4 days  Attending Physician: Jaki Lopez MD  Primary Care Provider: Aden Rabago MD          Subjective:     Principal Problem:COVID-19        HPI:  41-year-old male with morbid obesity and COPD transferred from [facility] ED for a CTA due to exceeding size limits for the scanner at that facility.  He had presented there due to worsening dyspnea and cough productive of blood flecked sputum.  He tested positive for COVID-19 on 2/3 at [facility] and due to progressive dyspnea and pain in his legs he presented to [facility] on 02/06 and was diagnosed with bilateral DVTs.  He was admitted the hospital and started on anticoagulation; initially a heparin drip which was transitioned to Eliquis prior to discharge on 02/09.  He was given dexamethasone for COVID-19 during that hospitalization despite having a normal chest x-ray and no hypoxemia.  Since leaving the hospital his exertional dyspnea has increased.  He wears CPAP at night but lately he has been taking it off at night because he feels like he is not getting enough air to breathe when wearing it.      Overview/Hospital Course:  No notes on file      This encounter was provided through telemedicine.  Patient was transferred to the telemedicine service on:  02/15/2022   The patient location is: [address] [place] [address] A admitted 2/13/2022  7:41 PM.  Present with the patient at the time of the telemed/virtual assessment: n/a    Interval History/Overnight Events:     Feeling better today; no further hemoptysis and cough is now dry but + chest congestion; breo is causing oral soreness as advair has; tolerating flonase nasally; submitted respiratory cx      Review of Systems   Constitutional: Positive for fatigue and fever.   HENT: Negative for  sore throat.    Respiratory: Positive for cough and shortness of breath.    Cardiovascular: Negative for chest pain.   Gastrointestinal: Positive for diarrhea. Negative for vomiting.   Musculoskeletal: Positive for myalgias.        Inpatient Medications:  Scheduled Meds:   (Magic mouthwash) 1:1:1 diphenhydramine(Benadryl) 12.5mg/5ml liq, aluminum & magnesium hydroxide-simethicone (Maalox), LIDOcaine viscous 2%  10 mL Swish & Spit QID (AC & HS)    albuterol  2 puff Inhalation Q6H    apixaban  5 mg Oral BID    ascorbic acid (vitamin C)  500 mg Oral BID    azithromycin  500 mg Oral Daily    baclofen  40 mg Oral BID    cefTRIAXone (ROCEPHIN) IVPB  2 g Intravenous Q24H    dexAMETHasone  6 mg Oral Daily    dextromethorphan-guaiFENesin  mg/5 ml  10 mL Oral TID    fluticasone propionate  1 spray Each Nostril Daily    gabapentin  600 mg Oral TID    meloxicam  15 mg Oral Daily    montelukast  10 mg Oral QHS    multivitamin  1 tablet Oral Daily    pantoprazole  40 mg Oral Daily    remdesivir infusion  100 mg Intravenous Daily    sucralfate  1 g Oral QID (AC & HS)    traZODone  100 mg Oral Nightly     Continuous Infusions:  PRN Meds:.acetaminophen, dextrose 10%, dextrose 10%, glucagon (human recombinant), glucose, glucose, hydrALAZINE, HYDROcodone-acetaminophen, loperamide, melatonin, prochlorperazine, promethazine-codeine 6.25-10 mg/5 ml, senna-docusate 8.6-50 mg, sodium chloride 0.9%      Objective:     Temp:  [97.7 °F (36.5 °C)-98.2 °F (36.8 °C)] 97.9 °F (36.6 °C)  Pulse:  [77-98] 89  Resp:  [16-24] 18  SpO2:  [96 %-100 %] 97 %  BP: (136-150)/(75-99) 140/77    No intake or output data in the 24 hours ending 02/17/22 0111     Body mass index is 72.7 kg/m².    Physical Exam  Vitals and nursing note reviewed.   Constitutional:       General: He is not in acute distress.     Appearance: He is obese. He is ill-appearing.   HENT:      Head: Normocephalic and atraumatic.      Right Ear: Hearing normal.       Left Ear: Hearing normal.      Nose: Nose normal.   Eyes:      General: No scleral icterus.        Right eye: No discharge.         Left eye: No discharge.      Extraocular Movements: Extraocular movements intact.   Cardiovascular:      Rate and Rhythm: Normal rate.   Pulmonary:      Effort: Pulmonary effort is normal. No accessory muscle usage or respiratory distress.   Skin:     Findings: No rash.   Neurological:      General: No focal deficit present.      Mental Status: He is alert and oriented to person, place, and time.      Cranial Nerves: No cranial nerve deficit.      Motor: No weakness.   Psychiatric:         Attention and Perception: Attention normal.         Mood and Affect: Affect is flat.         Behavior: Behavior is cooperative.         Thought Content: Thought content normal.          Labs:  Recent Results (from the past 24 hour(s))   C-Reactive Protein    Collection Time: 02/16/22  4:23 AM   Result Value Ref Range    CRP 44.1 (H) 0.0 - 8.2 mg/L   Comprehensive metabolic panel    Collection Time: 02/16/22  4:23 AM   Result Value Ref Range    Sodium 140 136 - 145 mmol/L    Potassium 4.2 3.5 - 5.1 mmol/L    Chloride 103 95 - 110 mmol/L    CO2 27 23 - 29 mmol/L    Glucose 93 70 - 110 mg/dL    BUN 14 6 - 20 mg/dL    Creatinine 1.0 0.5 - 1.4 mg/dL    Calcium 9.4 8.7 - 10.5 mg/dL    Total Protein 8.0 6.0 - 8.4 g/dL    Albumin 3.4 (L) 3.5 - 5.2 g/dL    Total Bilirubin 0.5 0.1 - 1.0 mg/dL    Alkaline Phosphatase 82 55 - 135 U/L    AST 30 10 - 40 U/L    ALT 36 10 - 44 U/L    Anion Gap 10 8 - 16 mmol/L    eGFR if African American >60.0 >60 mL/min/1.73 m^2    eGFR if non African American >60.0 >60 mL/min/1.73 m^2   POCT glucose    Collection Time: 02/16/22  7:56 AM   Result Value Ref Range    POCT Glucose 116 (H) 70 - 110 mg/dL   Culture, Respiratory with Gram Stain    Collection Time: 02/16/22  3:26 PM    Specimen: Sputum, Expectorated; Respiratory   Result Value Ref Range    Gram Stain (Respiratory) <10  epithelial cells per low power field.     Gram Stain (Respiratory) Rare WBC's     Gram Stain (Respiratory) Few Gram negative rods         Lab Results   Component Value Date    [id number] Positive (A) 02/13/2022       Recent Labs   Lab 02/13/22  1328 02/13/22  1328 02/14/22  0347 02/15/22  0547   WBC 6.60  --  5.61 7.03   LYMPH 20.6  1.4   < > 14.4*  0.8* 23.9  1.7   HGB 14.0  --  13.1* 13.8*   HCT 44.4  --  42.2 44.1     --  182 184    < > = values in this interval not displayed.     Recent Labs   Lab 02/13/22  1328 02/15/22  0944 02/16/22  0423    136 140   K 4.3 4.4 4.2    98 103   CO2 28 26 27   BUN 10 14 14   CREATININE 1.3 1.2 1.0   * 195* 93   CALCIUM 9.5 8.7 9.4   MG  --  1.7  --    PHOS  --  3.2  --      Recent Labs   Lab 02/13/22  1328 02/15/22  0944 02/16/22  0423   ALKPHOS 87 76 82   ALT 54* 38 36   AST 27 26 30   ALBUMIN 3.7 3.2* 3.4*   PROT 7.8 7.2 8.0   BILITOT 0.6 0.7 0.5        Recent Labs     02/14/22  0347 02/15/22  2253 02/16/22  0423   DDIMER  --  0.64*  --    CRP  --   --  44.1*   BNP 30  --   --        All labs within the last 24 hours were reviewed.     Microbiology:  Microbiology Results (last 7 days)     Procedure Component Value Units Date/Time    Culture, Respiratory with Gram Stain [[id number]] Collected: 02/16/22 1526    Order Status: Completed Specimen: Respiratory from Sputum, Expectorated Updated: 02/16/22 2020     Gram Stain (Respiratory) <10 epithelial cells per low power field.     Gram Stain (Respiratory) Rare WBC's     Gram Stain (Respiratory) Few Gram negative rods            Imaging  ECG Results          EKG 12-lead (Final result)  Result time 02/14/22 11:42:44    Final result by Interface, Lab In [facility] (02/14/22 11:42:44)                 Narrative:    Test Reason : R00.0,    Vent. Rate : 114 BPM     Atrial Rate : 114 BPM     P-R Int : 146 ms          QRS Dur : 086 ms      QT Int : 332 ms       P-R-T Axes : 047 062 -05 degrees     QTc Int : 457  ms    Sinus tachycardia  Nonspecific ST and T wave abnormality  Abnormal ECG  When compared with ECG of 13-FEB-2022 14:07,  T wave inversion now evident in Inferior leads  Confirmed by GAUTAM BEE MD ([id number]) on 2/14/2022 11:42:33 AM    Referred By: YAMILET ANDERSON           Confirmed By:GAUTAM BEE MD                              No results found for this or any previous visit.      CTA Chest Non-Coronary (PE Study)  Narrative: EXAMINATION:  CTA CHEST NON CORONARY    CLINICAL HISTORY:  Pulmonary embolism (PE) suspected, high prob;    TECHNIQUE:  Low dose axial images, sagittal and coronal reformations were obtained from the thoracic inlet to the lung bases following the IV administration of 100 mL of Omnipaque 350.  Contrast timing was optimized to evaluate the pulmonary arteries.  MIP images were performed.    COMPARISON:  None    FINDINGS:  Pulmonary arteries:Pulmonary arteries are suboptimally enhanced.No filling defects to indicate pulmonary embolism.  Limited visualization of segmental and subsegmental pulmonary arteries.    Thoracic soft tissues: Unremarkable.    Aorta: Left-sided aortic arch.  No aneurysm or dissection.    Heart: Normal size. No effusion.    Sallie/Mediastinum: No pathologic jonny enlargement.    Airways: Patent.    Lungs/Pleura: Mild peripheral patchy alveolar infiltrate suspicious for viral/COVID pneumonia.  No mass or lobar consolidation.  No effusion or pneumothorax.    Esophagus: Unremarkable.    Upper Abdomen: No abnormality of the partially imaged upper abdomen.    Bones: No acute fracture. No suspicious lytic or sclerotic lesions.  Impression: 1. No evidence of pulmonary embolism.  There is limited visualization of segmental and subsegmental pulmonary arteries with suboptimal pulmonary artery enhancement.  Recommend follow-up as clinically indicated.  2. Mild bilateral peripheral patchy alveolar infiltrates suspicious for viral/COVID pneumonia.  Recommend clinical correlation and  follow-up.  3.  This report was flagged in Epic as abnormal.    Electronically signed by: Ken Villasenor  Date:    02/13/2022  Time:    21:37  US Lower Extremity Veins Bilateral  Narrative: EXAMINATION:  US LOWER EXTREMITY VEINS BILATERAL    CLINICAL HISTORY:  Other specified abnormal findings of blood chemistry history of DVT    TECHNIQUE:  Duplex and color flow Doppler and dynamic compression was performed of the bilateral lower extremity veins was performed.  Study limited by body habitus    COMPARISON:  02/07/2022    FINDINGS:  Right thigh veins: The common femoral, femoral,  upper greater saphenous, and deep femoral veins are patent and free of thrombus. The veins are normally compressible and have normal phasic flow and augmentation response.  The right popliteal vein is incompressible and there is nonocclusive DVT within it similar to the previous study    Right calf veins: The visualized calf veins are patent.  There is relatively poor visualization of the peroneal vein and it is difficult to assess whether or not the previously noted DVT has resolved.    Left thigh veins: The common femoral, femoral, popliteal, upper greater saphenous, and deep femoral veins are patent and free of thrombus. The veins are normally compressible and have normal phasic flow and augmentation response.  The DVT noted previously in the femoral, popliteal veins is not clearly identified currently.    Left calf veins: The visualized calf veins are patent.  Poor visualization of peroneal vein, difficult to assess for residual DVT.    Miscellaneous: None  Impression: Limited study.  No new DVT is identified.  Nonocclusive DVT in the right popliteal vein is similar to the previous exam.  DVT previously described in the left thigh is not identified currently.    Electronically signed by: Pam Downing  Date:    02/13/2022  Time:    15:35  X-Ray Chest AP Portable  Narrative: EXAMINATION:  XR CHEST AP PORTABLE    CLINICAL  HISTORY:  Shortness of breath    TECHNIQUE:  Single frontal view of the chest was performed.    COMPARISON:  02/06/2022    FINDINGS:  A portable view of the chest was obtained and is significantly limited by body habitus and portable technique.  The cardiomediastinal silhouette is stable.  The lungs are clear.  There is no pleural effusion.  There are several external artifacts.  Impression: Limited study, grossly negative.    Electronically signed by: Pam Downing  Date:    02/13/2022  Time:    14:35      All imaging within the last 24 hours was reviewed.       Discharge Planning   GREY: 2/18/2022     Code Status: Full Code   Is the patient medically ready for discharge?: No    Reason for patient still in hospital (select all that apply): Patient unstable, Patient trending condition and Treatment  Discharge Plan A: Home with family            Assessment/Plan:      * COVID-19  Oxygen placed once SpO2 at 91%; CT scan showed viral pneumonia; patient with hemoptysis 2/15 (also at home); respiratory cx ordered; empiric Abx with rocephin and zithromax; needs 6 min walk before discharge    Patient is identified as High risk for severe complications of COVID 19 based on COVID risk score of 3   Initiate standard COVID protocols; COVID-19 testing Collection Date: 2/7/2022 Collection Time:   3:48 AM ,Infection Control notification  and isolation- respiratory, contact and droplet per protocol    Diagnostics: (leukopenia, hyponatremia, hyperferritinemia, elevated troponin, elevated d-dimer, age, and comorbidities are significant predictors of poor clinical outcome)  CBC, CMP, Procalcitonin, Ferritin, CRP, BNP and Portable CXR    Management: Initiate targeted therapy with Remdesivir, 200mg IV x1, followed by 100mg IV daily x5 days total, Dexamethasone PO/IV 6mg daily x10 days and Anticoagulation, Patient with contraindication to anticoagulation- already on apixaban, Inhaled bronchodilators as needed for shortness of  breath. and Continuous cardiac monitoring.    Advance Care Planning  Current advance care plan has not been discussed with patient/family/POA and patient currently wishes Full Code.     Will consider discharging after 3 days of remdesivir.    Obstructive sleep apnea on CPAP  -continue CPAP nightly  -patient encouraged to f/u with his sleep MD if he continues to have difficulty tolerating      Chronic bronchitis  Will replace his Symbicort with Breo while in the hospital.  Will use inhalers as per COVID order set, and if needed can order nebulizer treatments.  Unclear if he has had formal PFTs.  He has a smoking history, but is unclear to what extent his morbid obesity make be contributing to his respiratory difficulties.  -not tolerating breo due to oral soreness; same with symbicort at home;change to flovent at discharge    Acute deep vein thrombosis (DVT) of both lower extremities  Still on Eliquis initiation at 10 mg b.i.d. through 2/15, then he will step down to 5 mg b.i.d..    Sciatica of right side  -continue gabapentin and pain meds      Class 3 severe obesity due to excess calories with serious comorbidity and body mass index (BMI) greater than or equal to 70 in adult  Body mass index is 72.7 kg/m². Morbid obesity complicates all aspects of disease management from diagnostic modalities to treatment. Weight loss encouraged and health benefits explained to patient.   This presents his most significant risk factor for severe COVID-19 illness, along with his pulmonary issues.        VTE Risk Mitigation (From admission, onward)         Ordered     apixaban tablet 5 mg  2 times daily         02/13/22 8081                I have assessed these findings virtually using a telemed platform and with assistance of the bedside nurse.          The attending portion of this evaluation, treatment, and documentation was performed per Jaki Lopez MD via Telemedicine AudioVisual using the secure [employer] software platform with  2 way audio/video. The provider was located off-site and the patient is located in the hospital. The aforementioned video software was utilized to document the relevant history and physical exam    Jaki Lopez MD  Department of Hospital Medicine   [facility] - Intensive Care ([place]-)

## 2022-02-17 NOTE — SUBJECTIVE & OBJECTIVE
This encounter was provided through telemedicine.  Patient was transferred to the telemedicine service on:  02/15/2022   The patient location is: [id number] A admitted 2/13/2022  7:41 PM.  Present with the patient at the time of the telemed/virtual assessment: n/a    Interval History/Overnight Events:     Feeling better today; no further hemoptysis and cough is now dry but + chest congestion; breo is causing oral soreness as advair has; tolerating flonase nasally; submitted respiratory cx      Review of Systems   Constitutional: Positive for fatigue and fever.   HENT: Negative for sore throat.    Respiratory: Positive for cough and shortness of breath.    Cardiovascular: Negative for chest pain.   Gastrointestinal: Positive for diarrhea. Negative for vomiting.   Musculoskeletal: Positive for myalgias.        Inpatient Medications:  Scheduled Meds:   (Magic mouthwash) 1:1:1 diphenhydramine(Benadryl) 12.5mg/5ml liq, aluminum & magnesium hydroxide-simethicone (Maalox), LIDOcaine viscous 2%  10 mL Swish & Spit QID (AC & HS)    albuterol  2 puff Inhalation Q6H    apixaban  5 mg Oral BID    ascorbic acid (vitamin C)  500 mg Oral BID    azithromycin  500 mg Oral Daily    baclofen  40 mg Oral BID    cefTRIAXone (ROCEPHIN) IVPB  2 g Intravenous Q24H    dexAMETHasone  6 mg Oral Daily    dextromethorphan-guaiFENesin  mg/5 ml  10 mL Oral TID    fluticasone propionate  1 spray Each Nostril Daily    gabapentin  600 mg Oral TID    meloxicam  15 mg Oral Daily    montelukast  10 mg Oral QHS    multivitamin  1 tablet Oral Daily    pantoprazole  40 mg Oral Daily    remdesivir infusion  100 mg Intravenous Daily    sucralfate  1 g Oral QID (AC & HS)    traZODone  100 mg Oral Nightly     Continuous Infusions:  PRN Meds:.acetaminophen, dextrose 10%, dextrose 10%, glucagon (human recombinant), glucose, glucose, hydrALAZINE, HYDROcodone-acetaminophen, loperamide, melatonin, prochlorperazine, promethazine-codeine  6.25-10 mg/5 ml, senna-docusate 8.6-50 mg, sodium chloride 0.9%      Objective:     Temp:  [97.7 °F (36.5 °C)-98.2 °F (36.8 °C)] 97.9 °F (36.6 °C)  Pulse:  [77-98] 89  Resp:  [16-24] 18  SpO2:  [96 %-100 %] 97 %  BP: (136-150)/(75-99) 140/77    No intake or output data in the 24 hours ending 02/17/22 0111     Body mass index is 72.7 kg/m².    Physical Exam  Vitals and nursing note reviewed.   Constitutional:       General: He is not in acute distress.     Appearance: He is obese. He is ill-appearing.   HENT:      Head: Normocephalic and atraumatic.      Right Ear: Hearing normal.      Left Ear: Hearing normal.      Nose: Nose normal.   Eyes:      General: No scleral icterus.        Right eye: No discharge.         Left eye: No discharge.      Extraocular Movements: Extraocular movements intact.   Cardiovascular:      Rate and Rhythm: Normal rate.   Pulmonary:      Effort: Pulmonary effort is normal. No accessory muscle usage or respiratory distress.   Skin:     Findings: No rash.   Neurological:      General: No focal deficit present.      Mental Status: He is alert and oriented to person, place, and time.      Cranial Nerves: No cranial nerve deficit.      Motor: No weakness.   Psychiatric:         Attention and Perception: Attention normal.         Mood and Affect: Affect is flat.         Behavior: Behavior is cooperative.         Thought Content: Thought content normal.          Labs:  Recent Results (from the past 24 hour(s))   C-Reactive Protein    Collection Time: 02/16/22  4:23 AM   Result Value Ref Range    CRP 44.1 (H) 0.0 - 8.2 mg/L   Comprehensive metabolic panel    Collection Time: 02/16/22  4:23 AM   Result Value Ref Range    Sodium 140 136 - 145 mmol/L    Potassium 4.2 3.5 - 5.1 mmol/L    Chloride 103 95 - 110 mmol/L    CO2 27 23 - 29 mmol/L    Glucose 93 70 - 110 mg/dL    BUN 14 6 - 20 mg/dL    Creatinine 1.0 0.5 - 1.4 mg/dL    Calcium 9.4 8.7 - 10.5 mg/dL    Total Protein 8.0 6.0 - 8.4 g/dL     Albumin 3.4 (L) 3.5 - 5.2 g/dL    Total Bilirubin 0.5 0.1 - 1.0 mg/dL    Alkaline Phosphatase 82 55 - 135 U/L    AST 30 10 - 40 U/L    ALT 36 10 - 44 U/L    Anion Gap 10 8 - 16 mmol/L    eGFR if African American >60.0 >60 mL/min/1.73 m^2    eGFR if non African American >60.0 >60 mL/min/1.73 m^2   POCT glucose    Collection Time: 02/16/22  7:56 AM   Result Value Ref Range    POCT Glucose 116 (H) 70 - 110 mg/dL   Culture, Respiratory with Gram Stain    Collection Time: 02/16/22  3:26 PM    Specimen: Sputum, Expectorated; Respiratory   Result Value Ref Range    Gram Stain (Respiratory) <10 epithelial cells per low power field.     Gram Stain (Respiratory) Rare WBC's     Gram Stain (Respiratory) Few Gram negative rods         Lab Results   Component Value Date    [id number] Positive (A) 02/13/2022       Recent Labs   Lab 02/13/22  1328 02/13/22  1328 02/14/22  0347 02/15/22  0547   WBC 6.60  --  5.61 7.03   LYMPH 20.6  1.4   < > 14.4*  0.8* 23.9  1.7   HGB 14.0  --  13.1* 13.8*   HCT 44.4  --  42.2 44.1     --  182 184    < > = values in this interval not displayed.     Recent Labs   Lab 02/13/22  1328 02/15/22  0944 02/16/22  0423    136 140   K 4.3 4.4 4.2    98 103   CO2 28 26 27   BUN 10 14 14   CREATININE 1.3 1.2 1.0   * 195* 93   CALCIUM 9.5 8.7 9.4   MG  --  1.7  --    PHOS  --  3.2  --      Recent Labs   Lab 02/13/22  1328 02/15/22  0944 02/16/22  0423   ALKPHOS 87 76 82   ALT 54* 38 36   AST 27 26 30   ALBUMIN 3.7 3.2* 3.4*   PROT 7.8 7.2 8.0   BILITOT 0.6 0.7 0.5        Recent Labs     02/14/22  0347 02/15/22  2253 02/16/22  0423   DDIMER  --  0.64*  --    CRP  --   --  44.1*   BNP 30  --   --        All labs within the last 24 hours were reviewed.     Microbiology:  Microbiology Results (last 7 days)     Procedure Component Value Units Date/Time    Culture, Respiratory with Gram Stain [[id number]] Collected: 02/16/22 1526    Order Status: Completed Specimen: Respiratory from  Sputum, Expectorated Updated: 02/16/22 2020     Gram Stain (Respiratory) <10 epithelial cells per low power field.     Gram Stain (Respiratory) Rare WBC's     Gram Stain (Respiratory) Few Gram negative rods            Imaging  ECG Results          EKG 12-lead (Final result)  Result time 02/14/22 11:42:44    Final result by Interface, Lab In [facility] (02/14/22 11:42:44)                 Narrative:    Test Reason : R00.0,    Vent. Rate : 114 BPM     Atrial Rate : 114 BPM     P-R Int : 146 ms          QRS Dur : 086 ms      QT Int : 332 ms       P-R-T Axes : 047 062 -05 degrees     QTc Int : 457 ms    Sinus tachycardia  Nonspecific ST and T wave abnormality  Abnormal ECG  When compared with ECG of 13-FEB-2022 14:07,  T wave inversion now evident in Inferior leads  Confirmed by GAUTAM BEE MD ([id number]) on 2/14/2022 11:42:33 AM    Referred By: YAMILET ANDERSON           Confirmed By:GAUTAM BEE MD                              No results found for this or any previous visit.      CTA Chest Non-Coronary (PE Study)  Narrative: EXAMINATION:  CTA CHEST NON CORONARY    CLINICAL HISTORY:  Pulmonary embolism (PE) suspected, high prob;    TECHNIQUE:  Low dose axial images, sagittal and coronal reformations were obtained from the thoracic inlet to the lung bases following the IV administration of 100 mL of Omnipaque 350.  Contrast timing was optimized to evaluate the pulmonary arteries.  MIP images were performed.    COMPARISON:  None    FINDINGS:  Pulmonary arteries:Pulmonary arteries are suboptimally enhanced.No filling defects to indicate pulmonary embolism.  Limited visualization of segmental and subsegmental pulmonary arteries.    Thoracic soft tissues: Unremarkable.    Aorta: Left-sided aortic arch.  No aneurysm or dissection.    Heart: Normal size. No effusion.    Sallie/Mediastinum: No pathologic jonny enlargement.    Airways: Patent.    Lungs/Pleura: Mild peripheral patchy alveolar infiltrate suspicious for viral/COVID  pneumonia.  No mass or lobar consolidation.  No effusion or pneumothorax.    Esophagus: Unremarkable.    Upper Abdomen: No abnormality of the partially imaged upper abdomen.    Bones: No acute fracture. No suspicious lytic or sclerotic lesions.  Impression: 1. No evidence of pulmonary embolism.  There is limited visualization of segmental and subsegmental pulmonary arteries with suboptimal pulmonary artery enhancement.  Recommend follow-up as clinically indicated.  2. Mild bilateral peripheral patchy alveolar infiltrates suspicious for viral/COVID pneumonia.  Recommend clinical correlation and follow-up.  3.  This report was flagged in Epic as abnormal.    Electronically signed by: Ken Villasenor  Date:    02/13/2022  Time:    21:37  US Lower Extremity Veins Bilateral  Narrative: EXAMINATION:  US LOWER EXTREMITY VEINS BILATERAL    CLINICAL HISTORY:  Other specified abnormal findings of blood chemistry history of DVT    TECHNIQUE:  Duplex and color flow Doppler and dynamic compression was performed of the bilateral lower extremity veins was performed.  Study limited by body habitus    COMPARISON:  02/07/2022    FINDINGS:  Right thigh veins: The common femoral, femoral,  upper greater saphenous, and deep femoral veins are patent and free of thrombus. The veins are normally compressible and have normal phasic flow and augmentation response.  The right popliteal vein is incompressible and there is nonocclusive DVT within it similar to the previous study    Right calf veins: The visualized calf veins are patent.  There is relatively poor visualization of the peroneal vein and it is difficult to assess whether or not the previously noted DVT has resolved.    Left thigh veins: The common femoral, femoral, popliteal, upper greater saphenous, and deep femoral veins are patent and free of thrombus. The veins are normally compressible and have normal phasic flow and augmentation response.  The DVT noted previously in the  femoral, popliteal veins is not clearly identified currently.    Left calf veins: The visualized calf veins are patent.  Poor visualization of peroneal vein, difficult to assess for residual DVT.    Miscellaneous: None  Impression: Limited study.  No new DVT is identified.  Nonocclusive DVT in the right popliteal vein is similar to the previous exam.  DVT previously described in the left thigh is not identified currently.    Electronically signed by: Pam Downing  Date:    02/13/2022  Time:    15:35  X-Ray Chest AP Portable  Narrative: EXAMINATION:  XR CHEST AP PORTABLE    CLINICAL HISTORY:  Shortness of breath    TECHNIQUE:  Single frontal view of the chest was performed.    COMPARISON:  02/06/2022    FINDINGS:  A portable view of the chest was obtained and is significantly limited by body habitus and portable technique.  The cardiomediastinal silhouette is stable.  The lungs are clear.  There is no pleural effusion.  There are several external artifacts.  Impression: Limited study, grossly negative.    Electronically signed by: Pam Downing  Date:    02/13/2022  Time:    14:35      All imaging within the last 24 hours was reviewed.       Discharge Planning   GREY: 2/18/2022     Code Status: Full Code   Is the patient medically ready for discharge?: No    Reason for patient still in hospital (select all that apply): Patient unstable, Patient trending condition and Treatment  Discharge Plan A: Home with family

## 2022-02-17 NOTE — ASSESSMENT & PLAN NOTE
Oxygen placed once SpO2 at 91%; CT scan showed viral pneumonia; patient with hemoptysis 2/15 (also at home); respiratory cx ordered; empiric Abx with rocephin and zithromax; needs 6 min walk before discharge    Patient is identified as High risk for severe complications of COVID 19 based on COVID risk score of 3   Initiate standard COVID protocols; COVID-19 testing Collection Date: 2/7/2022 Collection Time:   3:48 AM ,Infection Control notification  and isolation- respiratory, contact and droplet per protocol    Diagnostics: (leukopenia, hyponatremia, hyperferritinemia, elevated troponin, elevated d-dimer, age, and comorbidities are significant predictors of poor clinical outcome)  CBC, CMP, Procalcitonin, Ferritin, CRP, BNP and Portable CXR    Management: Initiate targeted therapy with Remdesivir, 200mg IV x1, followed by 100mg IV daily x5 days total, Dexamethasone PO/IV 6mg daily x10 days and Anticoagulation, Patient with contraindication to anticoagulation- already on apixaban, Inhaled bronchodilators as needed for shortness of breath. and Continuous cardiac monitoring.    Advance Care Planning  Current advance care plan has not been discussed with patient/family/POA and patient currently wishes Full Code.      Will consider discharging after 3 days of remdesivir.

## 2022-02-18 VITALS
SYSTOLIC BLOOD PRESSURE: 170 MMHG | BODY MASS INDEX: 46.65 KG/M2 | RESPIRATION RATE: 20 BRPM | TEMPERATURE: 98 F | DIASTOLIC BLOOD PRESSURE: 99 MMHG | HEIGHT: 69 IN | OXYGEN SATURATION: 98 % | HEART RATE: 86 BPM | WEIGHT: 315 LBS

## 2022-02-18 LAB
ALBUMIN SERPL BCP-MCNC: 3.1 G/DL (ref 3.5–5.2)
ALP SERPL-CCNC: 81 U/L (ref 55–135)
ALT SERPL W/O P-5'-P-CCNC: 43 U/L (ref 10–44)
ANION GAP SERPL CALC-SCNC: 8 MMOL/L (ref 8–16)
AST SERPL-CCNC: 25 U/L (ref 10–40)
BACTERIA BLD CULT: NORMAL
BACTERIA BLD CULT: NORMAL
BILIRUB SERPL-MCNC: 0.5 MG/DL (ref 0.1–1)
BUN SERPL-MCNC: 12 MG/DL (ref 6–20)
CALCIUM SERPL-MCNC: 9.3 MG/DL (ref 8.7–10.5)
CHLORIDE SERPL-SCNC: 103 MMOL/L (ref 95–110)
CO2 SERPL-SCNC: 31 MMOL/L (ref 23–29)
CREAT SERPL-MCNC: 0.9 MG/DL (ref 0.5–1.4)
EST. GFR  (AFRICAN AMERICAN): >60 ML/MIN/1.73 M^2
EST. GFR  (NON AFRICAN AMERICAN): >60 ML/MIN/1.73 M^2
GLUCOSE SERPL-MCNC: 125 MG/DL (ref 70–110)
POTASSIUM SERPL-SCNC: 4.5 MMOL/L (ref 3.5–5.1)
PROT SERPL-MCNC: 7.6 G/DL (ref 6–8.4)
SODIUM SERPL-SCNC: 142 MMOL/L (ref 136–145)

## 2022-02-18 PROCEDURE — 63600175 PHARM REV CODE 636 W HCPCS: Performed by: INTERNAL MEDICINE

## 2022-02-18 PROCEDURE — 99239 HOSP IP/OBS DSCHRG MGMT >30: CPT | Mod: 95,,, | Performed by: INTERNAL MEDICINE

## 2022-02-18 PROCEDURE — 99900035 HC TECH TIME PER 15 MIN (STAT)

## 2022-02-18 PROCEDURE — 36415 COLL VENOUS BLD VENIPUNCTURE: CPT | Performed by: INTERNAL MEDICINE

## 2022-02-18 PROCEDURE — 25000003 PHARM REV CODE 250: Performed by: HOSPITALIST

## 2022-02-18 PROCEDURE — 94761 N-INVAS EAR/PLS OXIMETRY MLT: CPT

## 2022-02-18 PROCEDURE — 94664 DEMO&/EVAL PT USE INHALER: CPT

## 2022-02-18 PROCEDURE — 27000221 HC OXYGEN, UP TO 24 HOURS

## 2022-02-18 PROCEDURE — 63600175 PHARM REV CODE 636 W HCPCS: Performed by: HOSPITALIST

## 2022-02-18 PROCEDURE — 80053 COMPREHEN METABOLIC PANEL: CPT | Performed by: INTERNAL MEDICINE

## 2022-02-18 PROCEDURE — 94660 CPAP INITIATION&MGMT: CPT

## 2022-02-18 PROCEDURE — 25000003 PHARM REV CODE 250: Performed by: INTERNAL MEDICINE

## 2022-02-18 PROCEDURE — 94640 AIRWAY INHALATION TREATMENT: CPT

## 2022-02-18 PROCEDURE — 99239 PR HOSPITAL DISCHARGE DAY,>30 MIN: ICD-10-PCS | Mod: 95,,, | Performed by: INTERNAL MEDICINE

## 2022-02-18 RX ORDER — GUAIFENESIN/DEXTROMETHORPHAN 100-10MG/5
10 SYRUP ORAL 3 TIMES DAILY
Refills: 0 | COMMUNITY
Start: 2022-02-18 | End: 2022-02-28

## 2022-02-18 RX ORDER — PROMETHAZINE HYDROCHLORIDE AND CODEINE PHOSPHATE 6.25; 1 MG/5ML; MG/5ML
2.5 SOLUTION ORAL EVERY 6 HOURS PRN
Qty: 118 ML | Refills: 0 | Status: SHIPPED | OUTPATIENT
Start: 2022-02-18 | End: 2022-10-19

## 2022-02-18 RX ORDER — ALBUTEROL SULFATE 90 UG/1
2 AEROSOL, METERED RESPIRATORY (INHALATION) EVERY 6 HOURS PRN
Status: DISCONTINUED | OUTPATIENT
Start: 2022-02-18 | End: 2022-02-18 | Stop reason: HOSPADM

## 2022-02-18 RX ORDER — ALBUTEROL SULFATE 90 UG/1
2 AEROSOL, METERED RESPIRATORY (INHALATION) EVERY 6 HOURS
Status: DISCONTINUED | OUTPATIENT
Start: 2022-02-18 | End: 2022-02-18

## 2022-02-18 RX ORDER — ALBUTEROL SULFATE 90 UG/1
2 AEROSOL, METERED RESPIRATORY (INHALATION) EVERY 6 HOURS PRN
Qty: 18 G | Refills: 0 | OUTPATIENT
Start: 2022-02-18 | End: 2022-04-19

## 2022-02-18 RX ORDER — SUCRALFATE 1 G/1
1 TABLET ORAL
Qty: 40 TABLET | Refills: 0 | Status: SHIPPED | OUTPATIENT
Start: 2022-02-18 | End: 2022-02-28

## 2022-02-18 RX ADMIN — PROMETHAZINE HYDROCHLORIDE AND CODEINE PHOSPHATE 2.5 ML: 10; 6.25 SOLUTION ORAL at 05:02

## 2022-02-18 RX ADMIN — APIXABAN 5 MG: 5 TABLET, FILM COATED ORAL at 12:02

## 2022-02-18 RX ADMIN — ALBUTEROL SULFATE 2 PUFF: 108 INHALANT RESPIRATORY (INHALATION) at 12:02

## 2022-02-18 RX ADMIN — ALBUTEROL SULFATE 2 PUFF: 108 INHALANT RESPIRATORY (INHALATION) at 08:02

## 2022-02-18 RX ADMIN — DEXAMETHASONE 6 MG: 4 TABLET ORAL at 12:02

## 2022-02-18 RX ADMIN — FLUTICASONE PROPIONATE 50 MCG: 50 SPRAY, METERED NASAL at 12:02

## 2022-02-18 RX ADMIN — THERA TABS 1 TABLET: TAB at 12:02

## 2022-02-18 RX ADMIN — OXYCODONE HYDROCHLORIDE AND ACETAMINOPHEN 500 MG: 500 TABLET ORAL at 12:02

## 2022-02-18 RX ADMIN — GABAPENTIN 600 MG: 300 CAPSULE ORAL at 12:02

## 2022-02-18 RX ADMIN — BACLOFEN 40 MG: 10 TABLET ORAL at 12:02

## 2022-02-18 RX ADMIN — SUCRALFATE 1 G: 1 TABLET ORAL at 12:02

## 2022-02-18 RX ADMIN — DIPHENHYDRAMINE HYDROCHLORIDE 10 ML: 25 SOLUTION ORAL at 06:02

## 2022-02-18 RX ADMIN — REMDESIVIR 100 MG: 100 INJECTION, POWDER, LYOPHILIZED, FOR SOLUTION INTRAVENOUS at 12:02

## 2022-02-18 RX ADMIN — DIPHENHYDRAMINE HYDROCHLORIDE 10 ML: 25 SOLUTION ORAL at 12:02

## 2022-02-18 RX ADMIN — CEFTRIAXONE 2 G: 2 INJECTION, SOLUTION INTRAVENOUS at 01:02

## 2022-02-18 RX ADMIN — MELOXICAM 15 MG: 15 TABLET ORAL at 12:02

## 2022-02-18 RX ADMIN — GUAIFENESIN AND DEXTROMETHORPHAN 10 ML: 100; 10 SYRUP ORAL at 12:02

## 2022-02-18 RX ADMIN — SUCRALFATE 1 G: 1 TABLET ORAL at 06:02

## 2022-02-18 RX ADMIN — PANTOPRAZOLE SODIUM 40 MG: 40 TABLET, DELAYED RELEASE ORAL at 12:02

## 2022-02-18 RX ADMIN — HYDROCODONE BITARTRATE AND ACETAMINOPHEN 1 TABLET: 10; 325 TABLET ORAL at 12:02

## 2022-02-18 NOTE — PLAN OF CARE
Patient resting quietly in bed when CM rounded. No family present. Patient in agreement with plan to discharge home today, denied the need for assistance with transportation at time of discharge, & verbalized understanding regarding the hospital follow up appointment scheduled with Dr. Aden Rabago (PCP) on 2/24/2022 at 1345.      Will continue to follow.

## 2022-02-18 NOTE — PLAN OF CARE
Patient is being d/c today with no Social Service needs identified at this time.      02/18/22 1036   Post-Acute Status   Post-Acute Authorization Other   Other Status No Post-Acute Service Needs     Joana Dempsey LMSW   - [facility]  Ext. [phone]

## 2022-02-18 NOTE — ASSESSMENT & PLAN NOTE
Oxygen placed once SpO2 at 91%; CT scan showed viral pneumonia; patient with hemoptysis 2/15 (also at home); respiratory cx ordered; empiric Abx with rocephin and zithromax; needs 6 min walk before discharge    Patient is identified as High risk for severe complications of COVID 19 based on COVID risk score of 3   Initiate standard COVID protocols; COVID-19 testing Collection Date: 2/7/2022 Collection Time:   3:48 AM ,Infection Control notification  and isolation- respiratory, contact and droplet per protocol    Diagnostics: (leukopenia, hyponatremia, hyperferritinemia, elevated troponin, elevated d-dimer, age, and comorbidities are significant predictors of poor clinical outcome)  CBC, CMP, Procalcitonin, Ferritin, CRP, BNP and Portable CXR    Management: Initiate targeted therapy with Remdesivir, 200mg IV x1, followed by 100mg IV daily x5 days total, Dexamethasone PO/IV 6mg daily x10 days and Anticoagulation, Patient with contraindication to anticoagulation- already on apixaban, Inhaled bronchodilators as needed for shortness of breath. and Continuous cardiac monitoring.    Advance Care Planning  Current advance care plan has not been discussed with patient/family/POA and patient currently wishes Full Code.      Plan for discharging after 5 days of remdesivir. Needs walk test

## 2022-02-18 NOTE — ASSESSMENT & PLAN NOTE
-continue CPAP nightly  -patient encouraged to f/u with his sleep MD if he continues to have difficulty tolerating   Bedside shift change report given to [address] [place] [address] (oncoming nurse) by Ambrosio ROME (offgoing nurse). Report included the following information SBAR, Kardex, ED Summary, MAR, Accordion and Recent Results. Fentanyl drip handed to nurse.

## 2022-02-18 NOTE — DISCHARGE SUMMARY
[facility] - Intensive Care ([place] [address])  [facility]  Telemedicine Discharge Summary      Patient Name: Marcello Fernandez  MRN: [id number]  Patient Class: IP- Inpatient  Admission Date: 2/13/2022  Hospital Length of Stay: 5 days  Discharge Date and Time:  02/18/2022 9:50 AM  Attending Physician: Shruthi Holt MD   Discharging Provider: Shruthi Holt MD  Primary Care Provider: Aden Rabago MD      HPI:   41-year-old male with morbid obesity and COPD transferred from [facility] for a CTA due to exceeding size limits for the scanner at that facility.  He had presented there due to worsening dyspnea and cough productive of blood flecked sputum.  He tested positive for COVID-19 on 2/3 at [facility] and due to progressive dyspnea and pain in his legs he presented to [facility] on 02/06 and was diagnosed with bilateral DVTs.  He was admitted the hospital and started on anticoagulation; initially a heparin drip which was transitioned to Eliquis prior to discharge on 02/09.  He was given dexamethasone for COVID-19 during that hospitalization despite having a normal chest x-ray and no hypoxemia.  Since leaving the hospital his exertional dyspnea has increased.  He wears CPAP at night but lately he has been taking it off at night because he feels like he is not getting enough air to breathe when wearing it.      * No surgery found *      Hospital Course:     Marcello Fernandez was admitted to [facility] for treatment of COVID-19 viral infection and was treated with supportive care following a comprehensive physical, radiographic, and lab evaluation tailored to the current standard of care for COVID-19. The patient was treated with remdesivir and dexamethasone. The patient was not administered convalescent plasma; was not treated with tocilizumab or baracitinib.    On the day of discharge, isolation precautions were reviewed at length verbally. The patient was also provided  with written isolation guidelines from the [place] [facility] and [facility] as well as the CDC, as part of discharge paperwork, which recommends an isolation period of 20 days after first positive for patients hospitalized with COVID-19. The patient will be enrolled in the COVID-19 Home Symptom Monitoring program or [employer]. The patient was not discharged with home oxygen.    COVID-19 vaccination was discussed.     Immunizations Administered as of 2/18/2022  Reviewed on 2/17/2022    Name Date Dose VIS Date Route Exp Date    COVID-19, MRNA, LN-S, PF (Pfizer) (Purple Cap) 2/9/2022  2:18 PM 0.3 mL 9/22/2021 Intramuscular 6/30/2022    Site: Left deltoid     Given By: Karen Aj RN     : [employer]     Lot: [id number]     COVID-19, MRNA, LN-S, PF (Pfizer) (Purple Cap) 9/22/2021  1:12 PM 0.3 mL 12/12/2020 Intramuscular 10/31/2021    Site: Left deltoid     Given By: Jesi Villalobos     : [employer]     Lot: [id number]     COVID-19, MRNA, LN-S, PF (Pfizer) (Purple Cap) 8/20/2021  1:59 PM 0.3 mL 12/12/2020 Intramuscular 10/31/2021    Site: Right deltoid     Given By: Jesi Villalobos     : [employer]     Lot: [id number]           Additional details of the hospitalization include:      * COVID-19 Pneumonia with acute respiratory failure  Oxygen placed once SpO2 at 91%; CT scan showed viral pneumonia; patient with hemoptysis 2/15 (also at home); respiratory cx ordered; empiric Abx with Rocephin and Zithromax; needs 6 min walk before discharge     Patient is identified as High risk for severe complications of COVID 19 based on COVID risk score of 3   Initiate standard COVID protocols; COVID-19 testing Collection Date: 2/7/2022 Collection Time:   3:48 AM ,Infection Control notification  and isolation- respiratory, contact and droplet per protocol     Diagnostics: (leukopenia, hyponatremia, hyperferritinemia, elevated troponin, elevated d-dimer, age, and comorbidities are  significant predictors of poor clinical outcome)  CBC, CMP, Procalcitonin, Ferritin, CRP, BNP and Portable CXR     Management: Initiate targeted therapy with Remdesivir, 200mg IV x1, followed by 100mg IV daily x5 days total, Dexamethasone PO/IV 6mg daily x10 days and Anticoagulation, Patient with contraindication to anticoagulation- already on apixaban, Inhaled bronchodilators as needed for shortness of breath. and Continuous cardiac monitoring.     Advance Care Planning  Current advance care plan has not been discussed with patient/family/POA and patient currently wishes Full Code.      Plan for discharging after 5 days of remdesivir. Did well with walk test     Obstructive sleep apnea on CPAP  -continue CPAP nightly  -patient encouraged to f/u with his sleep MD if he continues to have difficulty tolerating     Chronic bronchitis  replace his Symbicort with Breo while in the hospital.  use inhalers as per COVID order set, and if needed can order nebulizer treatments.  Unclear if he has had formal PFTs.  He has a smoking history, but is unclear to what extent his morbid obesity make be contributing to his respiratory difficulties.  -not tolerating Breo due to oral soreness; same with Symbicort at home; change to Flovent at discharge     Acute deep vein thrombosis (DVT) of both lower extremities  on Eliquis initiation at 10 mg b.i.d. through 2/15, then he will step down to 5 mg b.i.d..     Sciatica of right side  -continue gabapentin and pain meds     Class 3 severe obesity due to excess calories with serious comorbidity and body mass index (BMI) greater than or equal to 70 in adult  Body mass index is 72.7 kg/m². Morbid obesity complicates all aspects of disease management from diagnostic modalities to treatment. Weight loss encouraged and health benefits explained to patient.        This presents his most significant risk factor for severe COVID-19 illness, along with his pulmonary issues.       Goals of Care  Treatment Preferences:  Code Status: Full Code      Consults:   Consults (From admission, onward)        Status Ordering Provider     Inpatient consult to PICC team ([facility])  Once        Provider:  (Not yet assigned)    Completed SPEEDY KUNZ     Inpatient virtual consult to Hospital Medicine  Once        Provider:  (Not yet assigned)    Completed LITA ORNELAS          Final Active Diagnoses:    Diagnosis Date Noted POA    PRINCIPAL PROBLEM:  COVID-19 [U07.1] 02/07/2022 Yes    Acute hypoxemic respiratory failure [J96.01] 02/17/2022 Yes    Obstructive sleep apnea on CPAP [G47.33, Z99.89] 02/15/2022 Not Applicable    Chronic bronchitis [J42] 02/14/2022 Yes    Acute deep vein thrombosis (DVT) of both lower extremities [I82.403] 02/07/2022 Yes    Sciatica of right side [M54.31] 06/22/2016 Yes    Class 3 severe obesity due to excess calories with serious comorbidity and body mass index (BMI) greater than or equal to 70 in adult [E66.01, Z68.45] 09/23/2014 Not Applicable      Problems Resolved During this Admission:       Discharged Condition: stable    Disposition: Home or Self Care    Follow Up:   Follow-up Information     Aden Rabago MD On 2/24/2022.    Specialty: Internal Medicine  Why: at 1:45pm; PCP hospital follow up appointment  Contact information:  [address] [facility] [address]  [phone]             [facility] - Emergency Dept.    Specialty: Emergency Medicine  Why: If symptoms worsen  Contact information:  [address]  [facility] [address] [phone]                       Patient Instructions:      Diet Adult Regular     COVID-19 Surveillance Program     Order Specific Question Answer Comments   Does patient have a smartphone? Yes    Does patient have the [facility] jovita on their smartphone? No    While in surveillance program, will patient be using home oxygen? No      Notify your health care provider if you experience any of the following:  temperature >100.4      Notify your health care provider if you experience any of the following:  persistent nausea and vomiting or diarrhea     Notify your health care provider if you experience any of the following:  difficulty breathing or increased cough     Notify your health care provider if you experience any of the following:  severe persistent headache     Notify your health care provider if you experience any of the following:  persistent dizziness, light-headedness, or visual disturbances     Notify your health care provider if you experience any of the following:  increased confusion or weakness     Activity as tolerated       Significant Diagnostic Studies:  Recent Labs   Lab 02/14/22  0347 02/15/22  0547 02/17/22  0415   WBC 5.61 7.03 8.63   HGB 13.1* 13.8* 13.6*   HCT 42.2 44.1 42.4    184 194     Recent Labs   Lab 02/14/22  0347 02/14/22  0347 02/15/22  0547 02/17/22  0415   GRAN 79.3*  4.5   < > 67.8  4.8 68.6  5.9   LYMPH 14.4*  0.8*   < > 23.9  1.7 23.6  2.0   MONO 5.7  0.3   < > 7.5  0.5 7.2  0.6   EOS 0.0  --  0.0 0.0    < > = values in this interval not displayed.     Recent Labs   Lab 02/15/22  0944 02/15/22  0944 02/16/22  0423 02/17/22  0619 02/18/22  0424      < > 140 141 142   K 4.4   < > 4.2 4.6 4.5   CL 98   < > 103 103 103   CO2 26   < > 27 31* 31*   BUN 14   < > 14 13 12   CREATININE 1.2   < > 1.0 1.1 0.9   *   < > 93 145* 125*   CALCIUM 8.7   < > 9.4 9.1 9.3   ALBUMIN 3.2*   < > 3.4* 3.2* 3.1*   MG 1.7  --   --   --   --    PHOS 3.2  --   --   --   --     < > = values in this interval not displayed.     Recent Labs   Lab 02/16/22 0423 02/17/22 0619 02/18/22 0424   ALKPHOS 82 84 81   ALT 36 37 43   AST 30 24 25   PROT 8.0 7.3 7.6   BILITOT 0.5 0.4 0.5     Procalcitonin (ng/mL)   Date Value   02/14/2022 0.08   02/13/2022 0.08     Lactate (Lactic Acid) (mmol/L)   Date Value   02/13/2022 1.3     BNP (pg/mL)   Date Value   02/14/2022 30   02/13/2022 39   02/06/2022 29    12/30/2021 13   12/16/2021 <10     CRP (mg/L)   Date Value   02/16/2022 44.1 (H)     D-Dimer   Date Value   02/15/2022 0.64 mg/L FEU (H)   02/14/2022 1.27 mg/L FEU (H)   02/13/2022 0.77 mg/L FEU (H)   02/06/2022 2.35 mg/L FEU (H)   12/30/2021 1.64 mg/L FEU (H)   12/14/2021 1.46 ug/mL FEU (H)   01/17/2018 0.75 mcg/mL (H)   03/13/2015 454 ng/mL (H)   03/04/2015 437 ng/mL (H)     Troponin I (ng/mL)   Date Value   02/13/2022 0.032 (H)   02/13/2022 0.022   02/06/2022 0.007   12/30/2021 0.009   12/14/2021 <0.012   03/08/2017 <0.012     SARS-CoV2 (COVID-19) Qualitative PCR (no units)   Date Value   02/07/2022 Not Detected     SARS-CoV-2 RNA, Amplification, Qual (no units)   Date Value   02/13/2022 Positive (A)   02/06/2022 Negative   12/30/2021 Negative   12/16/2021 Negative     ECG Results          EKG 12-lead (Final result)  Result time 02/14/22 11:42:44    Final result by Interface, Lab In [facility] (02/14/22 11:42:44)                 Narrative:    Test Reason : R00.0,    Vent. Rate : 114 BPM     Atrial Rate : 114 BPM     P-R Int : 146 ms          QRS Dur : 086 ms      QT Int : 332 ms       P-R-T Axes : 047 062 -05 degrees     QTc Int : 457 ms    Sinus tachycardia  Nonspecific ST and T wave abnormality  Abnormal ECG  When compared with ECG of 13-FEB-2022 14:07,  T wave inversion now evident in Inferior leads  Confirmed by GAUTAM BEE MD ([id number]) on 2/14/2022 11:42:33 AM    Referred By: YAMILET ANDERSON           Confirmed By:GAUTAM BEE MD                            CTA Chest Non-Coronary (PE Study)  Narrative: EXAMINATION:  CTA CHEST NON CORONARY    CLINICAL HISTORY:  Pulmonary embolism (PE) suspected, high prob;    TECHNIQUE:  Low dose axial images, sagittal and coronal reformations were obtained from the thoracic inlet to the lung bases following the IV administration of 100 mL of Omnipaque 350.  Contrast timing was optimized to evaluate the pulmonary arteries.  MIP images were  performed.    COMPARISON:  None    FINDINGS:  Pulmonary arteries:Pulmonary arteries are suboptimally enhanced.No filling defects to indicate pulmonary embolism.  Limited visualization of segmental and subsegmental pulmonary arteries.    Thoracic soft tissues: Unremarkable.    Aorta: Left-sided aortic arch.  No aneurysm or dissection.    Heart: Normal size. No effusion.    Sallie/Mediastinum: No pathologic jonny enlargement.    Airways: Patent.    Lungs/Pleura: Mild peripheral patchy alveolar infiltrate suspicious for viral/COVID pneumonia.  No mass or lobar consolidation.  No effusion or pneumothorax.    Esophagus: Unremarkable.    Upper Abdomen: No abnormality of the partially imaged upper abdomen.    Bones: No acute fracture. No suspicious lytic or sclerotic lesions.  Impression: 1. No evidence of pulmonary embolism.  There is limited visualization of segmental and subsegmental pulmonary arteries with suboptimal pulmonary artery enhancement.  Recommend follow-up as clinically indicated.  2. Mild bilateral peripheral patchy alveolar infiltrates suspicious for viral/COVID pneumonia.  Recommend clinical correlation and follow-up.  3.  This report was flagged in Epic as abnormal.    Electronically signed by: Ken Villasenor  Date:    02/13/2022  Time:    21:37  US Lower Extremity Veins Bilateral  Narrative: EXAMINATION:  US LOWER EXTREMITY VEINS BILATERAL    CLINICAL HISTORY:  Other specified abnormal findings of blood chemistry history of DVT    TECHNIQUE:  Duplex and color flow Doppler and dynamic compression was performed of the bilateral lower extremity veins was performed.  Study limited by body habitus    COMPARISON:  02/07/2022    FINDINGS:  Right thigh veins: The common femoral, femoral,  upper greater saphenous, and deep femoral veins are patent and free of thrombus. The veins are normally compressible and have normal phasic flow and augmentation response.  The right popliteal vein is incompressible and there is  nonocclusive DVT within it similar to the previous study    Right calf veins: The visualized calf veins are patent.  There is relatively poor visualization of the peroneal vein and it is difficult to assess whether or not the previously noted DVT has resolved.    Left thigh veins: The common femoral, femoral, popliteal, upper greater saphenous, and deep femoral veins are patent and free of thrombus. The veins are normally compressible and have normal phasic flow and augmentation response.  The DVT noted previously in the femoral, popliteal veins is not clearly identified currently.    Left calf veins: The visualized calf veins are patent.  Poor visualization of peroneal vein, difficult to assess for residual DVT.    Miscellaneous: None  Impression: Limited study.  No new DVT is identified.  Nonocclusive DVT in the right popliteal vein is similar to the previous exam.  DVT previously described in the left thigh is not identified currently.    Electronically signed by: Pam Downing  Date:    02/13/2022  Time:    15:35  X-Ray Chest AP Portable  Narrative: EXAMINATION:  XR CHEST AP PORTABLE    CLINICAL HISTORY:  Shortness of breath    TECHNIQUE:  Single frontal view of the chest was performed.    COMPARISON:  02/06/2022    FINDINGS:  A portable view of the chest was obtained and is significantly limited by body habitus and portable technique.  The cardiomediastinal silhouette is stable.  The lungs are clear.  There is no pleural effusion.  There are several external artifacts.  Impression: Limited study, grossly negative.    Electronically signed by: Pam Downing  Date:    02/13/2022  Time:    14:35       Medications:  Reconciled Home Medications:      Medication List      START taking these medications    dextromethorphan-guaiFENesin  mg/5 ml  mg/5 mL liquid  Commonly known as: ROBITUSSIN-DM  Take 10 mLs by mouth 3 (three) times daily. for 10 days     magic mouthwash  diphen/antac/lidoc  Swish and spit 5 mLs every 4 (four) hours as needed for mouth sores.     multivitamin Tab  Take 1 tablet by mouth once daily.     promethazine-codeine 6.25-10 mg/5 ml 6.25-10 mg/5 mL syrup  Commonly known as: PHENERGAN with CODEINE  Take 2.5 mLs by mouth every 6 (six) hours as needed for Cough.     pulse oximeter device  Commonly known as: pulse oximeter  by Apply Externally route 2 (two) times a day. Use twice daily at 8 AM and 3 PM and record the value in [employer] as directed.     sucralfate 1 gram tablet  Commonly known as: CARAFATE  Take 1 tablet (1 g total) by mouth 4 (four) times daily before meals and nightly. for 10 days        CHANGE how you take these medications    albuterol 90 mcg/actuation inhaler  Commonly known as: PROVENTIL/VENTOLIN HFA  Inhale 2 puffs into the lungs every 6 (six) hours as needed for Wheezing. Rescue  What changed: how much to take     apixaban 5 mg Tab  Commonly known as: ELIQUIS  Take 1 tablet (5 mg total) by mouth 2 (two) times daily.  What changed:   · how much to take  · how to take this  · when to take this  · additional instructions        CONTINUE taking these medications    ADVAIR DISKUS 250-50 mcg/dose diskus inhaler  Generic drug: fluticasone-salmeterol 250-50 mcg/dose  Inhale 1 puff into the lungs 2 (two) times daily.     albuterol-ipratropium 2.5 mg-0.5 mg/3 mL nebulizer solution  Commonly known as: DUO-NEB  Take 3 mLs by nebulization every 6 (six) hours as needed.     baclofen 20 MG tablet  Commonly known as: LIORESAL  Take 2 tablets (40 mg total) by mouth 2 (two) times daily.     fluticasone propionate 50 mcg/actuation nasal spray  Commonly known as: FLONASE  1 spray by Each Nostril route once daily.     gabapentin 300 MG capsule  Commonly known as: NEURONTIN  Take 600 mg by mouth 3 (three) times daily.     HYDROcodone-acetaminophen  mg per tablet  Commonly known as: NORCO  Take 1 tablet by mouth every 6 (six) hours as needed for Pain.      meloxicam 15 MG tablet  Commonly known as: MOBIC  Take 15 mg by mouth once daily.     montelukast 10 mg tablet  Commonly known as: SINGULAIR  Take 10 mg by mouth every evening.     pantoprazole 40 MG tablet  Commonly known as: PROTONIX  Take 40 mg by mouth once daily.     testosterone cypionate 200 mg/mL injection  Commonly known as: DEPOTESTOTERONE CYPIONATE  Inject 200 mg into the muscle every 14 (fourteen) days.     traZODone 100 MG tablet  Commonly known as: DESYREL  Take 100 mg by mouth nightly.            Indwelling Lines/Drains at time of discharge:   Lines/Drains/Airways     None               Time spent on the discharge of patient: 45 minutes  This service was provided by Virtual Visit.   Patient was seen and examined on the date of discharge.  Additional time was spent speaking with consultants and case management, reviewing records, and/or discussing the plan of care with patient/family.  The patient location is: [id number] A  Admitted 2/13/2022  7:41 PM  Present with the patient at the time of the telemed/virtual assessment: N/A    Patient was transferred to [facility] on:   2/15/2022  This document was prepared by chart review and may not directly reflect my personal knowledge of the patient's case, clinical course, or significant events during the hospital stay.    The attending portion of this evaluation, treatment, and documentation was performed per Shruthi Holt MD via Telemedicine AudioVisual using the secure [employer] software platform with 2 way audio/video. The provider was located off-site and the patient is located in the hospital. The aforementioned video software was utilized to document the relevant history and physical exam. Secure [employer] software platform was used instead of [employer] for this visit.      Shruthi Holt MD  Department of Hospital Medicine  [facility] - Intensive Care ([place]-16)

## 2022-02-18 NOTE — PLAN OF CARE
Pt AAO X 4; able to express needs.  No c/o pain.  Ambulating independently in room to bathroom.  Last dose of remdesivir in the morning.  Possible discharge home.. Covid and Safety Precautions maintained.  Bed in low position,  call  light in reach.

## 2022-02-18 NOTE — ASSESSMENT & PLAN NOTE
replace his Symbicort with Breo while in the hospital.  use inhalers as per COVID order set, and if needed can order nebulizer treatments.  Unclear if he has had formal PFTs.  He has a smoking history, but is unclear to what extent his morbid obesity make be contributing to his respiratory difficulties.  -not tolerating Breo due to oral soreness; same with Symbicort at home; change to Flovent at discharge

## 2022-02-19 ENCOUNTER — NURSE TRIAGE (OUTPATIENT)
Dept: ADMINISTRATIVE | Facility: CLINIC | Age: 42
End: 2022-02-19
Payer: MEDICAID

## 2022-02-19 NOTE — TELEPHONE ENCOUNTER
Pt called for enrollment and he was interested but having trouble with my chart and he was moved to [facility] and will keep task in case can get in. Gave parameters for O2 sat and IN   Reason for Disposition   Health Information question, no triage required and triager able to answer question    Protocols used: INFORMATION ONLY CALL - NO TRIAGE-A-

## 2022-02-19 NOTE — PLAN OF CARE
[facility] - Intensive Care ([facility]16)  Discharge Final Note    Primary Care Provider: Aden Rabago MD    Expected Discharge Date: 2/18/2022    Final Discharge Note (most recent)       Final Note - 02/19/22 0858          Final Note    Assessment Type Final Discharge Note (P)      Anticipated Discharge Disposition Home or Self Care (P)                      Important Message from Medicare             Contact Info       Aden Rabago MD   Specialty: Internal Medicine   Relationship: PCP - General    [address] [place] [address] [place] [address]   Phone: [phone]       Next Steps: Follow up on 2/24/2022    Instructions: at 1:45pm; PCP hospital follow up appointment    [facility] - Emergency Dept   Specialty: Emergency Medicine    [address]  [facility] [address]   Phone: [phone]       Next Steps: Follow up    Instructions: If symptoms worsen

## 2022-02-21 LAB
BACTERIA SPEC AEROBE CULT: ABNORMAL
GRAM STN SPEC: ABNORMAL

## 2022-02-21 NOTE — PHYSICIAN QUERY
PT Name: Marcello Fernandez  MR #: [id number]     DOCUMENTATION CLARIFICATION     CDS: Wilder Polanco RN CCDS               Contact information: [email or web address]   This form is a permanent document in the medical record.    Query Date: February 21, 2022    By submitting this query, we are merely seeking further clarification of documentation.  Please utilize your independent clinical judgment when addressing the question(s) below.  The Medical Record contains the following:   Indicators   Supporting Clinical Findings Location in Medical Record     x Pneumonia documented COVID-19. CT scan showed viral pneumonia. 2/14/2022 Hospital Medicine progress notes     x Chest X-Ray/CT Scan 1. No evidence of pulmonary embolism.  There is limited visualization of segmental and subsegmental pulmonary arteries with suboptimal pulmonary artery enhancement.  Recommend follow-up as clinically indicated.   2. Mild bilateral peripheral patchy alveolar infiltrates suspicious for viral/COVID pneumonia.  Recommend clinical correlation and follow-up. 2/13/2022 CTA chest     x PaO2    PaCO2     O2 sat O2 sat % on RA  O2 sat 91-99% on RA 2/13/2022 Vitals  2/14/2022 Vitals     x WBC  02/13/22 13:28 02/14/22 03:47 02/15/22 05:47 02/17/22 04:15   WBC 6.60 5.61 7.03 8.63    Lab values     x Vital Signs T 97.1-99.2, -116, RR 28-30, /103  T 98.2-101.1, HR , RR 18-22, /63 2/13/2022 Vitals  2/14/2022 Vitals     x Cultures  GRAM NEGATIVE YAN, NON-LACTOSE    Moderate   Non-viable for ID and susceptibility   Normal respiratory emil also present 2/16/2022 Sputum culture     x Treatment  remdesivir 200 mg IV x 1  remdesivir 100 mg IV daily  azithromycin 500 mg PO daily  cefTRIAXone (ROCEPHIN) 2 g IVPB q24h 2/14/2022 Medication  2/15-2/18/2022 Medication  2/15-2/17/2022 Medication  2/15-2/18/2022 Medication    Supplemental O2      Dysphagia/Swallow study       x Other About 10 episodes of hemoptysis  before lunch and would fill bottom of cup; slowed after lunch  Tessalon perles exacerbate cough; + fever overnight  respiratory cx ordered; empiric Abx with rocephin and Zithromax    no further hemoptysis and cough is now dry but + chest congestion    41-year-old male with morbid obesity and COPD transferred from [facility] for a CTA due to exceeding size limits for the scanner at that facility.  He had presented there due to worsening dyspnea and cough productive of blood flecked sputum.  He tested positive for COVID-19 on 2/3 at a [facility]    Chronic bronchitis 2/15/2022 [facility] progress notes        2/17/2022 [facility] progress notes    2/14/2022 H&P     Provider, please clarify the diagnosis of COVID Pneumonia related to the above clinical indicators:    [  X  ] COVID-19 Pneumonia with a Superimposed GNR Bacterial Pneumonia (please further specify organism being treated, if known):______           Please specify the GNR Bacterial Pneumonia Present on Admission status:          [   ] Yes (Y)          [   ] No (N)          [   ] Documentation insufficient to determine if condition is POA (U)          [  X  ] Clinically Undetermined (W)   [   ] COVID-19 Pneumonia only   [   ] Other type of pneumonia (please specify): ________   [   ] Other respiratory diagnosis (please specify): _________   [  ] Clinically undetermined           Please document in your progress notes daily for the duration of treatment, until resolved, and include in your discharge summary.     Form No. [id number]

## 2022-02-24 PROBLEM — U07.1 ACUTE RESPIRATORY FAILURE DUE TO COVID-19: Status: ACTIVE | Noted: 2022-02-24

## 2022-02-24 PROBLEM — J96.00 ACUTE RESPIRATORY FAILURE DUE TO COVID-19: Status: ACTIVE | Noted: 2022-02-24

## 2022-04-19 ENCOUNTER — HOSPITAL ENCOUNTER (EMERGENCY)
Facility: HOSPITAL | Age: 42
Discharge: HOME | End: 2022-04-19
Attending: SURGERY
Payer: MEDICAID

## 2022-04-19 VITALS
WEIGHT: 315 LBS | RESPIRATION RATE: 22 BRPM | SYSTOLIC BLOOD PRESSURE: 151 MMHG | OXYGEN SATURATION: 100 % | BODY MASS INDEX: 75.2 KG/M2 | DIASTOLIC BLOOD PRESSURE: 97 MMHG | HEART RATE: 106 BPM | TEMPERATURE: 97 F

## 2022-04-19 DIAGNOSIS — J44.1 COPD EXACERBATION: Primary | ICD-10-CM

## 2022-04-19 LAB
ALBUMIN SERPL BCP-MCNC: 3.6 G/DL (ref 3.5–5.2)
ALLENS TEST: ABNORMAL
ALP SERPL-CCNC: 84 U/L (ref 55–135)
ALT SERPL W/O P-5'-P-CCNC: 40 U/L (ref 10–44)
ANION GAP SERPL CALC-SCNC: 11 MMOL/L (ref 8–16)
AST SERPL-CCNC: 20 U/L (ref 10–40)
BASOPHILS # BLD AUTO: 0.03 K/UL (ref 0–0.2)
BASOPHILS NFR BLD: 0.3 % (ref 0–1.9)
BILIRUB SERPL-MCNC: 0.6 MG/DL (ref 0.1–1)
BNP SERPL-MCNC: 76 PG/ML (ref 0–99)
BUN SERPL-MCNC: 9 MG/DL (ref 6–20)
CALCIUM SERPL-MCNC: 9.4 MG/DL (ref 8.7–10.5)
CHLORIDE SERPL-SCNC: 104 MMOL/L (ref 95–110)
CK MB SERPL-MCNC: 4 NG/ML (ref 0.1–6.5)
CK MB SERPL-RTO: 0.7 % (ref 0–5)
CK SERPL-CCNC: 545 U/L (ref 20–200)
CK SERPL-CCNC: 545 U/L (ref 20–200)
CO2 SERPL-SCNC: 27 MMOL/L (ref 23–29)
CREAT SERPL-MCNC: 1.2 MG/DL (ref 0.5–1.4)
D DIMER PPP IA.FEU-MCNC: 0.99 MG/L FEU
DELSYS: ABNORMAL
DIFFERENTIAL METHOD: ABNORMAL
EOSINOPHIL # BLD AUTO: 0.1 K/UL (ref 0–0.5)
EOSINOPHIL NFR BLD: 0.8 % (ref 0–8)
ERYTHROCYTE [DISTWIDTH] IN BLOOD BY AUTOMATED COUNT: 13.7 % (ref 11.5–14.5)
EST. GFR  (AFRICAN AMERICAN): >60 ML/MIN/1.73 M^2
EST. GFR  (NON AFRICAN AMERICAN): >60 ML/MIN/1.73 M^2
FIO2: 21 (ref 21–100)
GLUCOSE SERPL-MCNC: 150 MG/DL (ref 70–110)
GROUP A STREP, MOLECULAR: NEGATIVE
HCO3 UR-SCNC: 29.1 MMOL/L
HCT VFR BLD AUTO: 41.2 % (ref 40–54)
HGB BLD-MCNC: 12.7 G/DL (ref 14–18)
IMM GRANULOCYTES # BLD AUTO: 0.02 K/UL (ref 0–0.04)
IMM GRANULOCYTES NFR BLD AUTO: 0.2 % (ref 0–0.5)
INFLUENZA A, MOLECULAR: NEGATIVE
INFLUENZA B, MOLECULAR: NEGATIVE
LACTATE SERPL-SCNC: 1.6 MMOL/L (ref 0.5–2.2)
LYMPHOCYTES # BLD AUTO: 1.6 K/UL (ref 1–4.8)
LYMPHOCYTES NFR BLD: 17.4 % (ref 18–48)
MCH RBC QN AUTO: 27.1 PG (ref 27–31)
MCHC RBC AUTO-ENTMCNC: 30.8 G/DL (ref 32–36)
MCV RBC AUTO: 88 FL (ref 82–98)
MONOCYTES # BLD AUTO: 0.5 K/UL (ref 0.3–1)
MONOCYTES NFR BLD: 5.8 % (ref 4–15)
NEUTROPHILS # BLD AUTO: 6.9 K/UL (ref 1.8–7.7)
NEUTROPHILS NFR BLD: 75.5 % (ref 38–73)
NRBC BLD-RTO: 0 /100 WBC
PCO2 BLDA: 48 MMHG (ref 35–45)
PH SMN: 7.39 [PH] (ref 7.35–7.45)
PLATELET # BLD AUTO: 221 K/UL (ref 150–450)
PMV BLD AUTO: 10.2 FL (ref 9.2–12.9)
PO2 BLDA: 68 MMHG (ref 75–100)
POC BE: 3.3 MMOL/L (ref -2–2)
POC COHB: 2.6 % (ref 0–3)
POC METHB: 1 % (ref 0–1.5)
POC O2HB ARTERIAL: 91.2 % (ref 94–100)
POC SATURATED O2: 94.6 % (ref 90–100)
POC TCO2: 30.6 MMOL/L
POC THB: 13 G/DL (ref 12–18)
POTASSIUM SERPL-SCNC: 4.2 MMOL/L (ref 3.5–5.1)
PROT SERPL-MCNC: 7.4 G/DL (ref 6–8.4)
RBC # BLD AUTO: 4.69 M/UL (ref 4.6–6.2)
SARS-COV-2 RDRP RESP QL NAA+PROBE: NEGATIVE
SITE: ABNORMAL
SODIUM SERPL-SCNC: 142 MMOL/L (ref 136–145)
SPECIMEN SOURCE: NORMAL
TROPONIN I SERPL DL<=0.01 NG/ML-MCNC: <0.006 NG/ML (ref 0–0.03)
WBC # BLD AUTO: 9.09 K/UL (ref 3.9–12.7)

## 2022-04-19 PROCEDURE — 36600 WITHDRAWAL OF ARTERIAL BLOOD: CPT

## 2022-04-19 PROCEDURE — 84484 ASSAY OF TROPONIN QUANT: CPT | Performed by: SURGERY

## 2022-04-19 PROCEDURE — 80053 COMPREHEN METABOLIC PANEL: CPT | Performed by: SURGERY

## 2022-04-19 PROCEDURE — 96372 THER/PROPH/DIAG INJ SC/IM: CPT | Performed by: SURGERY

## 2022-04-19 PROCEDURE — 87651 STREP A DNA AMP PROBE: CPT | Performed by: SURGERY

## 2022-04-19 PROCEDURE — 87040 BLOOD CULTURE FOR BACTERIA: CPT | Mod: 59 | Performed by: SURGERY

## 2022-04-19 PROCEDURE — 85379 FIBRIN DEGRADATION QUANT: CPT | Performed by: SURGERY

## 2022-04-19 PROCEDURE — 87502 INFLUENZA DNA AMP PROBE: CPT | Performed by: SURGERY

## 2022-04-19 PROCEDURE — 25000242 PHARM REV CODE 250 ALT 637 W/ HCPCS: Performed by: NURSE PRACTITIONER

## 2022-04-19 PROCEDURE — 36415 COLL VENOUS BLD VENIPUNCTURE: CPT | Performed by: SURGERY

## 2022-04-19 PROCEDURE — 83605 ASSAY OF LACTIC ACID: CPT | Performed by: SURGERY

## 2022-04-19 PROCEDURE — 85025 COMPLETE CBC W/AUTO DIFF WBC: CPT | Performed by: SURGERY

## 2022-04-19 PROCEDURE — 82553 CREATINE MB FRACTION: CPT | Performed by: SURGERY

## 2022-04-19 PROCEDURE — 93005 ELECTROCARDIOGRAM TRACING: CPT

## 2022-04-19 PROCEDURE — 93010 EKG 12-LEAD: ICD-10-PCS | Mod: ,,, | Performed by: INTERNAL MEDICINE

## 2022-04-19 PROCEDURE — 94640 AIRWAY INHALATION TREATMENT: CPT

## 2022-04-19 PROCEDURE — U0002 COVID-19 LAB TEST NON-CDC: HCPCS | Performed by: SURGERY

## 2022-04-19 PROCEDURE — 99285 EMERGENCY DEPT VISIT HI MDM: CPT | Mod: 25

## 2022-04-19 PROCEDURE — 82803 BLOOD GASES ANY COMBINATION: CPT | Performed by: SURGERY

## 2022-04-19 PROCEDURE — 93010 ELECTROCARDIOGRAM REPORT: CPT | Mod: ,,, | Performed by: INTERNAL MEDICINE

## 2022-04-19 PROCEDURE — 63600175 PHARM REV CODE 636 W HCPCS: Performed by: SURGERY

## 2022-04-19 PROCEDURE — 83880 ASSAY OF NATRIURETIC PEPTIDE: CPT | Performed by: SURGERY

## 2022-04-19 RX ORDER — BENZONATATE 100 MG/1
200 CAPSULE ORAL 3 TIMES DAILY PRN
Qty: 20 CAPSULE | Refills: 0 | Status: SHIPPED | OUTPATIENT
Start: 2022-04-19 | End: 2022-04-29

## 2022-04-19 RX ORDER — METHYLPREDNISOLONE SOD SUCC 125 MG
125 VIAL (EA) INJECTION
Status: DISCONTINUED | OUTPATIENT
Start: 2022-04-19 | End: 2022-04-19

## 2022-04-19 RX ORDER — LEVOFLOXACIN 500 MG/1
500 TABLET, FILM COATED ORAL DAILY
Qty: 7 TABLET | Refills: 0 | Status: SHIPPED | OUTPATIENT
Start: 2022-04-19 | End: 2022-04-26

## 2022-04-19 RX ORDER — METHYLPREDNISOLONE 4 MG/1
TABLET ORAL
Qty: 1 EACH | Refills: 0 | Status: SHIPPED | OUTPATIENT
Start: 2022-04-19 | End: 2022-12-19 | Stop reason: ALTCHOICE

## 2022-04-19 RX ORDER — IPRATROPIUM BROMIDE AND ALBUTEROL SULFATE 2.5; .5 MG/3ML; MG/3ML
3 SOLUTION RESPIRATORY (INHALATION)
Status: COMPLETED | OUTPATIENT
Start: 2022-04-19 | End: 2022-04-19

## 2022-04-19 RX ORDER — ALBUTEROL SULFATE 90 UG/1
1-2 AEROSOL, METERED RESPIRATORY (INHALATION) EVERY 6 HOURS PRN
Qty: 0.18 G | Refills: 0 | Status: SHIPPED | OUTPATIENT
Start: 2022-04-19 | End: 2023-12-13

## 2022-04-19 RX ORDER — METHYLPREDNISOLONE SOD SUCC 125 MG
125 VIAL (EA) INJECTION
Status: COMPLETED | OUTPATIENT
Start: 2022-04-19 | End: 2022-04-19

## 2022-04-19 RX ADMIN — METHYLPREDNISOLONE SODIUM SUCCINATE 125 MG: 125 INJECTION, POWDER, FOR SOLUTION INTRAMUSCULAR; INTRAVENOUS at 01:04

## 2022-04-19 RX ADMIN — IPRATROPIUM BROMIDE AND ALBUTEROL SULFATE 3 ML: 2.5; .5 SOLUTION RESPIRATORY (INHALATION) at 02:04

## 2022-04-19 NOTE — ED PROVIDER NOTES
Encounter Date: 4/19/2022       History     Chief Complaint   Patient presents with    Shortness of Breath     Patient reports tat he has been having SOB off and on for about a week, he reports that he has been very congested, walking makes his SOB much worse      Laterrance Julio Fernandez is a 41 y.o. 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   Patient reports that since covid-19 dx he has been continuously sob. Symptoms worse for the past week despite duoneb tx at home that he reports he is administering several times daily in addition to beztri inhaler BID.   He notes a cough that is productive of thick, green sputum.   Denies fever, chills or body aches.   Denies lower extremity swelling.   He reports that he has not had follow-up since dc from hospital.     The history is provided by the patient.     Review of patient's allergies indicates:  No Known Allergies  Past Medical History:   Diagnosis Date    COPD (chronic obstructive pulmonary disease)     Hypertension     Pulmonary embolism 2022     Past Surgical History:   Procedure Laterality Date    ANKLE SURGERY Left      Family History   Problem Relation Age of Onset    Diabetes Mellitus Mother     Diabetes Mellitus Father     Hypertension Father     Stroke Paternal Grandfather      Social History     Tobacco Use    Smoking status: Current Every Day Smoker     Years: 19.00     Types: Cigars     Start date: 3/4/1996    Smokeless tobacco: Never Used    Tobacco comment: pt stated smokes 5 cigars/day   Substance Use Topics    Alcohol use: Yes     Alcohol/week: 0.0 standard drinks     Comment: occasional    Drug use: No     Review of Systems   Constitutional: Positive for activity change. Negative for appetite change, chills and fever.   HENT: Negative for congestion, ear discharge, ear pain, postnasal drip, rhinorrhea and sore throat.    Respiratory: Positive  for cough, shortness of breath and wheezing. Negative for chest tightness.    Cardiovascular: Negative for chest pain.   Gastrointestinal: Negative for abdominal distention, abdominal pain and nausea.   Genitourinary: Negative for dysuria, flank pain, hematuria and urgency.   Musculoskeletal: Negative for arthralgias and back pain.   Skin: Negative for rash.   Neurological: Negative for dizziness, weakness, numbness and headaches.   Hematological: Does not bruise/bleed easily.       Physical Exam     Initial Vitals [04/19/22 1233]   BP Pulse Resp Temp SpO2   (!) 140/87 109 (!) 26 96.9 °F (36.1 °C) 99 %      MAP       --         Physical Exam    Nursing note and vitals reviewed.  Constitutional: He appears well-developed and well-nourished. He is Obese .   HENT:   Head: Normocephalic and atraumatic.   Eyes: Conjunctivae and EOM are normal. Pupils are equal, round, and reactive to light.   Neck: Neck supple.   Cardiovascular: Regular rhythm, intact distal pulses and normal pulses.  No extrasystoles are present.  Tachycardia present.  Exam reveals no decreased pulses.    Murmur heard.  Pulmonary/Chest: Tachypnea noted. He has decreased breath sounds. He has no wheezes. He has no rhonchi. He has no rales.   Abdominal: Abdomen is soft. Bowel sounds are normal.   Musculoskeletal:         General: Normal range of motion.      Cervical back: Neck supple.     Neurological: He is alert and oriented to person, place, and time. He has normal strength.   Skin: Skin is warm and dry.   Psychiatric: He has a normal mood and affect. His behavior is normal. Judgment and thought content normal.         ED Course   Procedures  Labs Reviewed   CBC W/ AUTO DIFFERENTIAL - Abnormal; Notable for the following components:       Result Value    Hemoglobin 12.7 (*)     MCHC 30.8 (*)     Gran % 75.5 (*)     Lymph % 17.4 (*)     All other components within normal limits   COMPREHENSIVE METABOLIC PANEL - Abnormal; Notable for the following  components:    Glucose 150 (*)     All other components within normal limits   CK-MB - Abnormal; Notable for the following components:     (*)     All other components within normal limits   CK - Abnormal; Notable for the following components:     (*)     All other components within normal limits   D DIMER, QUANTITATIVE - Abnormal; Notable for the following components:    D-Dimer 0.99 (*)     All other components within normal limits   INFLUENZA A & B BY MOLECULAR   GROUP A STREP, MOLECULAR   CULTURE, BLOOD   CULTURE, BLOOD   SARS-COV-2 RNA AMPLIFICATION, QUAL   TROPONIN I   B-TYPE NATRIURETIC PEPTIDE   LACTIC ACID, PLASMA          Imaging Results          US Lower Extremity Veins Bilateral (Final result)  Result time 04/19/22 13:58:03    Final result by Apolonia Painting MD (04/19/22 13:58:03)                 Impression:      No evidence of deep venous thrombosis in either lower extremity.      Electronically signed by: Apolonia Painting MD  Date:    04/19/2022  Time:    13:58             Narrative:    EXAMINATION:  US LOWER EXTREMITY VEINS BILATERAL    CLINICAL HISTORY:  Patient has a history of DVTs, on blood thinners but is short of breath;    TECHNIQUE:  Duplex and color flow Doppler and dynamic compression was performed of the bilateral lower extremity veins was performed.    COMPARISON:  02/13/2022    FINDINGS:  Right thigh veins: The common femoral, femoral, popliteal, upper greater saphenous, and deep femoral veins are patent and free of thrombus. The veins are normally compressible and have normal phasic flow and augmentation response.    Right calf veins: The visualized calf veins are patent.    Left thigh veins: The common femoral, femoral, popliteal, upper greater saphenous, and deep femoral veins are patent and free of thrombus. The veins are normally compressible and have normal phasic flow and augmentation response.    Left calf veins: The visualized calf veins are  patent.    Miscellaneous: None                               X-Ray Chest 1 View (Final result)  Result time 04/19/22 13:07:10    Final result by Apolonia Painting MD (04/19/22 13:07:10)                 Impression:      As above.      Electronically signed by: Apolonia Painting MD  Date:    04/19/2022  Time:    13:07             Narrative:    EXAMINATION:  XR CHEST 1 VIEW    CLINICAL HISTORY:  COVID;    TECHNIQUE:  Single frontal view of the chest was performed.    COMPARISON:  02/13/2022    FINDINGS:  Study significantly limited by overlying soft tissues.  The heart is enlarged.  There does appear to be some central pulmonary vascular congestion.  Likely component of mild interstitial edema.  No definite airspace consolidation is seen.                                 Medications   albuterol-ipratropium 2.5 mg-0.5 mg/3 mL nebulizer solution 3 mL (3 mLs Nebulization Given 4/19/22 1405)   methylPREDNISolone sodium succinate injection 125 mg (125 mg Intramuscular Given 4/19/22 1354)     Medical Decision Making:   Differential Diagnosis:   COPD Exacerbation,  Bronchitis, pneumonia,  PE  Independently Interpreted Test(s):   I have ordered and independently interpreted EKG Reading(s) - see summary below       <> Summary of EKG Reading(s): ST, t-wave abnormality in lateral leads. Rate 103. No STEMI  No significant change when compared to EKG of 02/13/2022.   Clinical Tests:   Lab Tests: Ordered and Reviewed  Radiological Study: Ordered and Reviewed  ED Management:  Evaluation of a 41-year-old morbidly obese male with increasing shortness of breath for the past week with associated productive cough.  Patient chronically short of breath since COVID diagnosis in February of 2022.  He has been using DuoNeb treatments at home for the past week due to wheezing, but reports that he is not getting much relief.   Breath sounds on exam or mildly diminished, no active wheezing presently.  Oxygen saturation of % on room air.  Lab  workup, no leukocytosis. D-dimer 0.99> taking eliquis BID.   CXR; no focal infiltrate, mild congestion.   US lower ext. Bilaterally negative.   Patient given DuoNeb treatment in the ED in addition to Solu-Medrol 125 mg. Feeling better after treatment.   Follow-up appointment placed with pulmonology tomorrow at 10:30 AM. Continue neb tx at home, discussed diet. Wear CPAP nightly. Patient/caregiver voices understanding and feels comfortable with discharge plan.      The patient acknowledges that close follow up with medical provider is required. Instructed to follow up with PCP within 2 days. Patient was given specific return precautions. The patient agrees to comply with all instruction and directions given in the ER.                       Clinical Impression:   Final diagnoses:  [J44.1] COPD exacerbation (Primary)          ED Disposition Condition    Discharge Stable        ED Prescriptions     Medication Sig Dispense Start Date End Date Auth. Provider    levoFLOXacin (LEVAQUIN) 500 MG tablet Take 1 tablet (500 mg total) by mouth once daily. for 7 days 7 tablet 4/19/2022 4/26/2022 Sunday Mars MD    methylPREDNISolone (MEDROL DOSEPACK) 4 mg tablet Pack as directed 1 each 4/19/2022  Sunday Mars MD    benzonatate (TESSALON) 100 MG capsule Take 2 capsules (200 mg total) by mouth 3 (three) times daily as needed for Cough. 20 capsule 4/19/2022 4/29/2022 Sunday Mars MD    albuterol (PROVENTIL/VENTOLIN HFA) 90 mcg/actuation inhaler Inhale 1-2 puffs into the lungs every 6 (six) hours as needed for Wheezing (sob). 0.18 g 4/19/2022 4/19/2023 Sunday Mars MD        Follow-up Information     Follow up With Specialties Details Why Contact Info    Kei Yao MD Pulmonary Disease Go in 1 day 10:30 am [address]  [place] [address]  [phone]             Mariaelena Harvey NP  04/19/22 1909

## 2022-04-24 LAB
BACTERIA BLD CULT: NORMAL
BACTERIA BLD CULT: NORMAL

## 2022-10-19 PROBLEM — R06.09 DOE (DYSPNEA ON EXERTION): Status: ACTIVE | Noted: 2022-10-19

## 2022-12-26 ENCOUNTER — HOSPITAL ENCOUNTER (EMERGENCY)
Facility: HOSPITAL | Age: 42
Discharge: HOME | End: 2022-12-26
Attending: SURGERY
Payer: MEDICAID

## 2022-12-26 VITALS
SYSTOLIC BLOOD PRESSURE: 142 MMHG | WEIGHT: 315 LBS | HEART RATE: 98 BPM | OXYGEN SATURATION: 100 % | RESPIRATION RATE: 18 BRPM | TEMPERATURE: 98 F | DIASTOLIC BLOOD PRESSURE: 86 MMHG | BODY MASS INDEX: 76.79 KG/M2

## 2022-12-26 DIAGNOSIS — E66.2 OBESITY HYPOVENTILATION SYNDROME: ICD-10-CM

## 2022-12-26 DIAGNOSIS — R45.89 ANXIETY ABOUT HEALTH: Primary | ICD-10-CM

## 2022-12-26 DIAGNOSIS — J20.9 ACUTE BRONCHITIS, UNSPECIFIED ORGANISM: ICD-10-CM

## 2022-12-26 DIAGNOSIS — R06.02 SOB (SHORTNESS OF BREATH): ICD-10-CM

## 2022-12-26 PROBLEM — J96.11 CHRONIC RESPIRATORY FAILURE WITH HYPOXIA AND HYPERCAPNIA: Status: ACTIVE | Noted: 2022-02-17

## 2022-12-26 PROBLEM — J96.12 CHRONIC RESPIRATORY FAILURE WITH HYPOXIA AND HYPERCAPNIA: Status: ACTIVE | Noted: 2022-02-17

## 2022-12-26 LAB
ALBUMIN SERPL BCP-MCNC: 3.6 G/DL (ref 3.5–5.2)
ALLENS TEST: ABNORMAL
ALP SERPL-CCNC: 79 U/L (ref 55–135)
ALT SERPL W/O P-5'-P-CCNC: 41 U/L (ref 10–44)
ANION GAP SERPL CALC-SCNC: 14 MMOL/L (ref 8–16)
AST SERPL-CCNC: 31 U/L (ref 10–40)
BASOPHILS # BLD AUTO: 0.03 K/UL (ref 0–0.2)
BASOPHILS NFR BLD: 0.3 % (ref 0–1.9)
BILIRUB SERPL-MCNC: 0.9 MG/DL (ref 0.1–1)
BNP SERPL-MCNC: 80 PG/ML (ref 0–99)
BUN SERPL-MCNC: 17 MG/DL (ref 6–20)
CALCIUM SERPL-MCNC: 9.3 MG/DL (ref 8.7–10.5)
CHLORIDE SERPL-SCNC: 98 MMOL/L (ref 95–110)
CK MB SERPL-MCNC: 5.2 NG/ML (ref 0.1–6.5)
CK MB SERPL-RTO: 0.9 % (ref 0–5)
CK SERPL-CCNC: 609 U/L (ref 20–200)
CK SERPL-CCNC: 609 U/L (ref 20–200)
CO2 SERPL-SCNC: 25 MMOL/L (ref 23–29)
CREAT SERPL-MCNC: 1.2 MG/DL (ref 0.5–1.4)
DELSYS: ABNORMAL
DIFFERENTIAL METHOD: ABNORMAL
EOSINOPHIL # BLD AUTO: 0.1 K/UL (ref 0–0.5)
EOSINOPHIL NFR BLD: 0.5 % (ref 0–8)
ERYTHROCYTE [DISTWIDTH] IN BLOOD BY AUTOMATED COUNT: 16.5 % (ref 11.5–14.5)
EST. GFR  (NO RACE VARIABLE): >60 ML/MIN/1.73 M^2
FIO2: 36 (ref 21–100)
GLUCOSE SERPL-MCNC: 122 MG/DL (ref 70–110)
HCO3 UR-SCNC: 34.5 MMOL/L
HCT VFR BLD AUTO: 43.9 % (ref 40–54)
HGB BLD-MCNC: 13.3 G/DL (ref 14–18)
IMM GRANULOCYTES # BLD AUTO: 0.05 K/UL (ref 0–0.04)
IMM GRANULOCYTES NFR BLD AUTO: 0.5 % (ref 0–0.5)
INFLUENZA A, MOLECULAR: NEGATIVE
INFLUENZA B, MOLECULAR: NEGATIVE
LYMPHOCYTES # BLD AUTO: 1 K/UL (ref 1–4.8)
LYMPHOCYTES NFR BLD: 8.8 % (ref 18–48)
MCH RBC QN AUTO: 24.9 PG (ref 27–31)
MCHC RBC AUTO-ENTMCNC: 30.3 G/DL (ref 32–36)
MCV RBC AUTO: 82 FL (ref 82–98)
MONOCYTES # BLD AUTO: 0.4 K/UL (ref 0.3–1)
MONOCYTES NFR BLD: 4.1 % (ref 4–15)
NEUTROPHILS # BLD AUTO: 9.3 K/UL (ref 1.8–7.7)
NEUTROPHILS NFR BLD: 85.8 % (ref 38–73)
NRBC BLD-RTO: 0 /100 WBC
PCO2 BLDA: 57 MMHG (ref 35–45)
PH SMN: 7.39 [PH] (ref 7.35–7.45)
PLATELET # BLD AUTO: 195 K/UL (ref 150–450)
PMV BLD AUTO: 10.1 FL (ref 9.2–12.9)
PO2 BLDA: 75 MMHG (ref 75–100)
POC BE: 7.7 MMOL/L (ref -2–2)
POC COHB: 3.2 % (ref 0–3)
POC METHB: 0.8 % (ref 0–1.5)
POC O2HB ARTERIAL: 92.8 % (ref 94–100)
POC SATURATED O2: 96.6 % (ref 90–100)
POC TCO2: 36.2 MMOL/L
POC THB: 13 G/DL (ref 12–18)
POTASSIUM SERPL-SCNC: 4.1 MMOL/L (ref 3.5–5.1)
PROLACTIN SERPL IA-MCNC: 10.8 NG/ML (ref 3.5–19.4)
PROT SERPL-MCNC: 7.7 G/DL (ref 6–8.4)
RBC # BLD AUTO: 5.34 M/UL (ref 4.6–6.2)
SARS-COV-2 RDRP RESP QL NAA+PROBE: NEGATIVE
SITE: ABNORMAL
SODIUM SERPL-SCNC: 137 MMOL/L (ref 136–145)
SPECIMEN SOURCE: NORMAL
TROPONIN I SERPL DL<=0.01 NG/ML-MCNC: 0.01 NG/ML (ref 0–0.03)
WBC # BLD AUTO: 10.85 K/UL (ref 3.9–12.7)

## 2022-12-26 PROCEDURE — 80053 COMPREHEN METABOLIC PANEL: CPT | Performed by: SURGERY

## 2022-12-26 PROCEDURE — U0002 COVID-19 LAB TEST NON-CDC: HCPCS | Performed by: SURGERY

## 2022-12-26 PROCEDURE — 85025 COMPLETE CBC W/AUTO DIFF WBC: CPT | Performed by: SURGERY

## 2022-12-26 PROCEDURE — 93005 ELECTROCARDIOGRAM TRACING: CPT

## 2022-12-26 PROCEDURE — 82803 BLOOD GASES ANY COMBINATION: CPT | Performed by: SURGERY

## 2022-12-26 PROCEDURE — 36600 WITHDRAWAL OF ARTERIAL BLOOD: CPT

## 2022-12-26 PROCEDURE — 84484 ASSAY OF TROPONIN QUANT: CPT | Performed by: SURGERY

## 2022-12-26 PROCEDURE — 83880 ASSAY OF NATRIURETIC PEPTIDE: CPT | Performed by: SURGERY

## 2022-12-26 PROCEDURE — 82553 CREATINE MB FRACTION: CPT | Performed by: SURGERY

## 2022-12-26 PROCEDURE — 99900035 HC TECH TIME PER 15 MIN (STAT)

## 2022-12-26 PROCEDURE — 93010 EKG 12-LEAD: ICD-10-PCS | Mod: ,,, | Performed by: INTERNAL MEDICINE

## 2022-12-26 PROCEDURE — 87502 INFLUENZA DNA AMP PROBE: CPT | Performed by: SURGERY

## 2022-12-26 PROCEDURE — 36415 COLL VENOUS BLD VENIPUNCTURE: CPT | Performed by: SURGERY

## 2022-12-26 PROCEDURE — 93010 ELECTROCARDIOGRAM REPORT: CPT | Mod: ,,, | Performed by: INTERNAL MEDICINE

## 2022-12-26 PROCEDURE — 99285 EMERGENCY DEPT VISIT HI MDM: CPT | Mod: 25

## 2022-12-26 PROCEDURE — 36415 COLL VENOUS BLD VENIPUNCTURE: CPT | Performed by: INTERNAL MEDICINE

## 2022-12-26 PROCEDURE — 82024 ASSAY OF ACTH: CPT | Performed by: INTERNAL MEDICINE

## 2022-12-26 PROCEDURE — 84146 ASSAY OF PROLACTIN: CPT | Performed by: INTERNAL MEDICINE

## 2022-12-26 RX ORDER — PREDNISONE 20 MG/1
40 TABLET ORAL DAILY
Qty: 20 TABLET | Refills: 0 | Status: SHIPPED | OUTPATIENT
Start: 2022-12-26 | End: 2023-01-05

## 2022-12-26 RX ORDER — LEVOFLOXACIN 500 MG/1
500 TABLET, FILM COATED ORAL DAILY
Qty: 10 TABLET | Refills: 0 | Status: SHIPPED | OUTPATIENT
Start: 2022-12-26 | End: 2023-01-05

## 2022-12-26 RX ORDER — LEVOTHYROXINE SODIUM 50 UG/1
50 TABLET ORAL
Qty: 30 TABLET | Refills: 11 | Status: SHIPPED | OUTPATIENT
Start: 2022-12-26 | End: 2023-12-26

## 2022-12-26 NOTE — ED PROVIDER NOTES
Encounter Date: 2022       History     Chief Complaint   Patient presents with    Shortness of Breath     Patient to ER CC of shortness of breath for months but much worse over the past month     42-year-old male presents with chronic shortness of breath issues for several months  Patient had COVID in 2022 with dependence on oxygen afterwards per HX  This is been compound by the fact that he is 500 pounds, severe obesity issues  Patient has been to several emergency rooms & several physicians with the SOB  Patient has had several evaluations in several CTA of the chest with no PEs noted  Patient states he feels short of breath with any ambulation despite 4 liters O2   Patient is not hypoxic in the ER with a 99% oxygenation on 4 liters this afternoon  Patient is not wheezing, patient is a smoker, patient is noncompliant with CPAP also  He is largely ignored all recommendations from previous specialist per chart review    Review of patient's allergies indicates:  No Known Allergies  Past Medical History:   Diagnosis Date    COPD (chronic obstructive pulmonary disease)     Hypertension     Obesity, unspecified     Pulmonary embolism      Past Surgical History:   Procedure Laterality Date    ANKLE SURGERY Left      Family History   Problem Relation Age of Onset    Diabetes Mellitus Mother     Diabetes Mellitus Father     Hypertension Father     Stroke Paternal Grandfather      Social History     Tobacco Use    Smoking status: Former     Types: Cigars     Start date: 3/4/1996     Quit date: 2022     Years since quittin.9    Smokeless tobacco: Never   Substance Use Topics    Alcohol use: Yes     Alcohol/week: 0.0 standard drinks     Comment: occasional    Drug use: No     Review of Systems   Constitutional:  Negative for diaphoresis, fatigue and fever.   HENT:  Negative for ear pain, hearing loss and tinnitus.    Eyes:  Negative for pain and redness.   Respiratory:  Positive for shortness of  breath. Negative for cough and wheezing.    Cardiovascular:  Negative for chest pain.   Gastrointestinal:  Negative for abdominal pain, constipation, diarrhea, nausea and rectal pain.   Musculoskeletal:  Negative for back pain, neck pain and neck stiffness.   Neurological:  Negative for dizziness, seizures, numbness and headaches.   Psychiatric/Behavioral:  Negative for confusion, decreased concentration and dysphoric mood.    All other systems reviewed and are negative.    Physical Exam     Initial Vitals [12/26/22 1406]   BP Pulse Resp Temp SpO2   (!) 142/86 107 (!) 30 -- 99 %      MAP       --         Physical Exam    Nursing note and vitals reviewed.  Constitutional: Vital signs are normal. He is cooperative.   (+) serum obesity with a BMI of approximately 80   HENT:   Head: Normocephalic and atraumatic.   Right Ear: Hearing, tympanic membrane, external ear and ear canal normal.   Left Ear: Hearing, tympanic membrane, external ear and ear canal normal.   Nose: Nose normal.   Mouth/Throat: Uvula is midline, oropharynx is clear and moist and mucous membranes are normal.   Eyes: Conjunctivae, EOM and lids are normal. Pupils are equal, round, and reactive to light.   Neck: Neck supple. No JVD present.   Normal range of motion.   Full passive range of motion without pain.     Cardiovascular:  Normal rate, regular rhythm, S1 normal, S2 normal, normal heart sounds, intact distal pulses and normal pulses.           Pulmonary/Chest: Effort normal and breath sounds normal.   Abdominal: Abdomen is soft and flat. Bowel sounds are normal.   Musculoskeletal:         General: Normal range of motion.      Cervical back: Full passive range of motion without pain, normal range of motion and neck supple.     Neurological: He is alert and oriented to person, place, and time. He has normal strength.   Skin: Skin is warm, dry and intact. Capillary refill takes less than 2 seconds.       ED Course   Procedures  Labs Reviewed    COMPREHENSIVE METABOLIC PANEL - Abnormal; Notable for the following components:       Result Value    Glucose 122 (*)     All other components within normal limits   CBC W/ AUTO DIFFERENTIAL - Abnormal; Notable for the following components:    Hemoglobin 13.3 (*)     MCH 24.9 (*)     MCHC 30.3 (*)     RDW 16.5 (*)     Gran # (ANC) 9.3 (*)     Immature Grans (Abs) 0.05 (*)     Gran % 85.8 (*)     Lymph % 8.8 (*)     All other components within normal limits   CK - Abnormal; Notable for the following components:     (*)     All other components within normal limits   CK-MB - Abnormal; Notable for the following components:     (*)     All other components within normal limits   INFLUENZA A & B BY MOLECULAR   TROPONIN I   B-TYPE NATRIURETIC PEPTIDE   SARS-COV-2 RNA AMPLIFICATION, QUAL     EKG Readings: (Independently Interpreted)   No STEMI  Normal sinus rhythm  No ectopy  Normal conduction  Normal ST segments  Normal T-wave  Normal axis  Heart rate in the 100s     Imaging Results              X-Ray Chest 1 View (Final result)  Result time 22 14:43:46      Final result by Jose Alvarado MD (22 14:43:46)                   Narrative:    EXAMINATION:  XR CHEST 1 VIEW    CLINICAL HISTORY:  CP;    TECHNIQUE:  Single frontal view of the chest was performed.    COMPARISON:  2022    FINDINGS:  Exam limited due to body habitus contributing x-ray beam scatter.    No airspace disease allowing for limitations.  Heart size is enlarged although unchanged and likely in part due to technique.  No pleural effusion pneumothorax.  No acute osseous abnormality.      Electronically signed by: Jose Alvarado  Date:    2022  Time:    14:43                                     Medications - No data to display  Medical Decision Makin-year-old male presents with chronic shortness breath over several years  Patient has been oxygen dependent since COVID in 2022 per HX  Additionally the  patient has obesity hypoventilation syndrome, 500 pounds  Patient had a workup today including EKG & stable chest x-ray in the ER now  Stable lab work, stable BNP, normal troponin, previous ER chart reviewed today  Patient had a CT PE study 6 days ago at [facility] on chart review     Patient with significant SOB & frustration due to his morbid obesity issues  I called pulmonologist Dr. Kei Yao who is familiar with the patient today  Dr. Yao came to the emergency room to evaluate this chronic SOB issue  Dr. Yao recommends Levaquin & steroids, bronchitis type symptoms today  Patient will see Dr. Yao in clinic tomorrow 10:00 a.m. for continued eval  Patient has shortness of breath with morbid obesity; needs to wear CPAP  Needs to quit smoking, patient is massively noncompliant with all Recs  He promises to do better & will follow-up with appropriate specialists  99% oxygen on discharge, return with any concerns on discharge                        Clinical Impression:   Final diagnoses:  [R06.02] SOB (shortness of breath)  [E66.2] Obesity hypoventilation syndrome (Primary)  [J20.9] Acute bronchitis, unspecified organism        ED Disposition Condition    Discharge Stable          ED Prescriptions       Medication Sig Dispense Start Date End Date Auth. Provider    predniSONE (DELTASONE) 20 MG tablet Take 2 tablets (40 mg total) by mouth once daily. for 10 days 20 tablet 12/26/2022 1/5/2023 Sunday Mars MD    levoFLOXacin (LEVAQUIN) 500 MG tablet Take 1 tablet (500 mg total) by mouth once daily. for 10 days 10 tablet 12/26/2022 1/5/2023 Sunday Mars MD          Follow-up Information       Follow up With Specialties Details Why Contact Info    Santy Byrd MD Cardiology Schedule an appointment as soon as possible for a visit in 2 days  [address]  [place] [address]  [phone]      Kei Yao MD Pulmonary Disease Go in 1 day 10 am [address]  [place] [address]  [phone]                Sunday Mars MD  12/26/22 1546

## 2022-12-26 NOTE — ASSESSMENT & PLAN NOTE
Patient with Hypoxic Respiratory failure which is Chronic.  he is not on home oxygen. Supplemental oxygen was provided and noted-  .   Signs/symptoms of respiratory failure include- respiratory distress. Contributing diagnoses includes - COPD Labs and images were reviewed. Patient Has recent ABG, which has been reviewed. Will treat underlying causes and adjust management of respiratory failure as follows- 4

## 2022-12-26 NOTE — CONSULTS
[facility] - Emergency Dept  Pulmonology  Consult Note    Patient Name: Marcello Fernandez  MRN: [id number]  Admission Date: 12/26/2022  Hospital Length of Stay: 0 days  Code Status: Prior  Attending Physician: Sunday Mars MD  Primary Care Provider: Aden Rabago MD   Principal Problem: <principal problem not specified>    Consults  Subjective:     HPI:  Marcello Fernandez is a 42 y.o. year old male that's presents with a chief complaint of SOB and Wheezing for several days.He is still smoking and not wearing CPAP or trilogy states his trilogy machine was taken away .He comes today for SOB cough and wheezing. Consulted to evaluate Respiratory status.    Past Medical History:   Diagnosis Date    COPD (chronic obstructive pulmonary disease)     Hypertension     Obesity, unspecified     Pulmonary embolism 2022        Past Surgical History:   Procedure Laterality Date    ANKLE SURGERY Left        Prior to Admission medications    Medication Sig Start Date End Date Taking? Authorizing Provider   ADVAIR DISKUS 250-50 mcg/dose diskus inhaler Inhale 1 puff into the lungs 2 (two) times daily. 1/21/22   Historical Provider   albuterol (PROVENTIL/VENTOLIN HFA) 90 mcg/actuation inhaler Inhale 1-2 puffs into the lungs every 6 (six) hours as needed for Wheezing (sob). 4/19/22 4/19/23  Sunday Mars MD   albuterol-ipratropium (DUO-NEB) 2.5 mg-0.5 mg/3 mL nebulizer solution Take 3 mLs by nebulization every 6 (six) hours as needed. 12/21/22   Kei Diego MD   apixaban (ELIQUIS) 5 mg Tab Take 1 tablet (5 mg total) by mouth 2 (two) times daily. 2/18/22 2/18/23  Shruthi Holt MD   atenoloL (TENORMIN) 50 MG tablet Take 50 mg by mouth every 12 (twelve) hours. 9/20/22   Historical Provider   azithromycin (Z-JULIETTE) 250 MG tablet Take 1 tablet (250 mg total) by mouth once daily. Take first 2 tablets together, then 1 every day until finished. 12/19/22   Gideon Ryan MD   baclofen (LIORESAL) 20 MG  tablet Take 2 tablets (40 mg total) by mouth 2 (two) times daily. 11/18/19 10/19/22  Brenda Hernandez NP   benzonatate (TESSALON) 100 MG capsule Take 1 capsule (100 mg total) by mouth 3 (three) times daily as needed for Cough. 12/21/22 12/26/22  Kei Diego MD   BREZTRI AEROSPHERE 160-9-4.8 mcg/actuation HFAA Inhale 2 puffs into the lungs 2 (two) times daily. 9/16/22   Historical Provider   ergocalciferol (ERGOCALCIFEROL) 50,000 unit Cap Take 50,000 Units by mouth twice a week. 6/15/22   Historical Provider   fluticasone propionate (FLONASE) 50 mcg/actuation nasal spray 1 spray by Each Nostril route once daily. 1/19/22   Historical Provider   gabapentin (NEURONTIN) 300 MG capsule Take 600 mg by mouth 3 (three) times daily.    Historical Provider   HYDROcodone-acetaminophen (NORCO)  mg per tablet Take 1 tablet by mouth every 6 (six) hours as needed for Pain. 11/15/21   Kip Infante NP   levoFLOXacin (LEVAQUIN) 500 MG tablet Take 1 tablet (500 mg total) by mouth once daily. for 10 days 12/26/22 1/5/23  Sunday Mars MD   meloxicam (MOBIC) 15 MG tablet Take 15 mg by mouth once daily. 1/3/22   Historical Provider   montelukast (SINGULAIR) 10 mg tablet Take 10 mg by mouth every evening. 1/19/22   Historical Provider   multivitamin Tab Take 1 tablet by mouth once daily.  Patient not taking: Reported on 10/19/2022 2/18/22   Shruthi Holt MD   mupirocin (BACTROBAN) 2 % ointment Apply topically 3 (three) times daily. 11/3/22   Shaun Denney MD   pantoprazole (PROTONIX) 40 MG tablet Take 40 mg by mouth once daily. 1/3/22   Historical Provider   predniSONE (DELTASONE) 20 MG tablet Take 2 tablets (40 mg total) by mouth once daily. for 10 days 12/26/22 1/5/23  Sunday Mars MD   pulse oximeter (PULSE OXIMETER) device by Apply Externally route 2 (two) times a day. Use twice daily at 8 AM and 3 PM and record the value in MyChart as directed.  Patient not taking: Reported on 10/19/2022 2/18/22   Shruthi LOVE  MD Yanet   QUEtiapine (SEROQUEL) 200 MG Tab Take 200 mg by mouth every evening. 10/13/22   Historical Provider   testosterone cypionate (DEPOTESTOTERONE CYPIONATE) 200 mg/mL injection Inject 200 mg into the muscle every 14 (fourteen) days. 1/3/22   Historical Provider   traZODone (DESYREL) 100 MG tablet Take 100 mg by mouth nightly. 21   Historical Provider   predniSONE (DELTASONE) 20 MG tablet Take 1 tablet (20 mg total) by mouth 2 (two) times daily. for 5 days 22  Kei Diego MD       Social History     Socioeconomic History    Marital status:     Years of education: 12   Tobacco Use    Smoking status: Former     Types: Cigars     Start date: 3/4/1996     Quit date: 2022     Years since quittin.9    Smokeless tobacco: Never   Substance and Sexual Activity    Alcohol use: Yes     Alcohol/week: 0.0 standard drinks     Comment: occasional    Drug use: No    Sexual activity: Yes     Partners: Female     Birth control/protection: None       Family History   Problem Relation Age of Onset    Diabetes Mellitus Mother     Diabetes Mellitus Father     Hypertension Father     Stroke Paternal Grandfather        Review of patient's allergies indicates:  No Known Allergies Allergies have been reviewed.     ROS: Review of Systems   Constitutional: Negative for chills, fever, malaise/fatigue and weight loss.   HENT: Positive for congestion. Negative for sore throat.    Eyes: Negative for double vision and photophobia.   Respiratory: Positive for cough, sputum production and shortness of breath. Negative for hemoptysis.    Cardiovascular: Positive for leg swelling (mild) and PND (occasional). Negative for palpitations.   Gastrointestinal: Negative for abdominal pain and diarrhea.   Genitourinary: Negative for dysuria and frequency.   Musculoskeletal: Positive for myalgias (generalized). Negative for back pain and neck pain.   Skin: Negative.  Negative for rash.   Neurological: Negative for  dizziness, weakness and headaches.   Endo/Heme/Allergies: Does not bruise/bleed easily.   Psychiatric/Behavioral: Negative for memory loss. The patient has insomnia (intermittant ).        PE:   Vitals:    12/26/22 1406 12/26/22 1424   BP: (!) 142/86    BP Location: Right arm    Patient Position: Sitting    Pulse: 107 105   Resp: (!) 30    SpO2: 99% 99%   Weight: (!) 235.9 kg (520 lb)     Physical Exam    Alert and orientated X 3   HEENT: Head: Normocephalic no trauma                Ears : Normal Pinna No Drainage no Battles sign                Eyes: Vision Unchanged, No conjunctivitis,No drainage                Neck: Supple, No JVD,No Abnormal Carotid Pulsations                Throat: No Erythema, No pus,No Swelling,Mallampati score= 4++    Chest: Very poor air entry and wheezing with Rhonchi   Cardiac: RRR S1+ S2 with a -S3: +M = 2/6, No R/H/G  Abdomen: Bowel Sounds are Normal.Soft Abdomen. No organomegaly of Liver,Spleen,or Kidneys   CNS: Non focal and intact. Cranial nerves 2, 346,8,9,10 and 12 are normal.Norrmal gait.Normal posture.  Extremities: No Clubbing,No Cyanosis with oxygen,Positive mild edema of lower extremities Bilateral  Skin: No Rash, No Ulcerative sores,and No cellulitis of the IV site.    Lab Results   Component Value Date    WBC 10.85 12/26/2022    HGB 13.3 (L) 12/26/2022    HCT 43.9 12/26/2022     12/26/2022    CHOL 152 08/25/2016    TRIG 72 08/25/2016    HDL 47 08/25/2016    ALT 41 12/26/2022    AST 31 12/26/2022     12/26/2022    K 4.1 12/26/2022    CL 98 12/26/2022    CREATININE 1.2 12/26/2022    BUN 17 12/26/2022    CO2 25 12/26/2022    TSH 0.270 (L) 08/25/2016    INR 1.0 02/07/2022      Latest Reference Range & Units Most Recent   BNP 0 - 99 pg/mL 80  12/26/22 14:14   CPK 20 - 200 U/L  20 - 200 U/L 609 (H)  12/26/22 14:14  609 (H)  12/26/22 14:14   CPK MB 0.1 - 6.5 ng/mL 5.2  12/26/22 14:14   MB % 0.0 - 5.0 % 0.9  12/26/22 14:14   NT-proBNP 0.0 - 450.0 pg/mL  0.0 - 450.0 pg/mL  359.0  12/19/22 18:51  359.0  12/19/22 18:51   Troponin I 0.000 - 0.026 ng/mL 0.010  12/26/22 14:14   Lactate, Jh 0.5 - 2.2 mmol/L 1.6  4/19/22 12:47   TSH 0.400 - 4.000 uIU/mL 0.270 (L)  8/25/16 11:33   Free T4 0.71 - 1.51 ng/dL 0.78  8/25/16 11:33   Procalcitonin <0.25 ng/mL 0.08  2/14/22 03:47   (H): Data is abnormally high  (L): Data is abnormally low  TECHNIQUE:  Single frontal view of the chest was performed.     COMPARISON:  12/21/2022     FINDINGS:  Exam limited due to body habitus contributing x-ray beam scatter.     No airspace disease allowing for limitations.  Heart size is enlarged although unchanged and likely in part due to technique.  No pleural effusion pneumothorax.  No acute osseous abnormality.        Electronically signed by: Jose Alvarado  Date:                                            12/26/2022            Assessment/Plan:     Chronic respiratory failure with hypoxia and hypercapnia  Patient with Hypoxic Respiratory failure which is Chronic.  he is not on home oxygen. Supplemental oxygen was provided and noted-  .   Signs/symptoms of respiratory failure include- respiratory distress. Contributing diagnoses includes - COPD Labs and images were reviewed. Patient Has recent ABG, which has been reviewed. Will treat underlying causes and adjust management of respiratory failure as follows- 4    COVID-19  SP covid 19     Tobacco dependence syndrome  History of smoking     HTN (hypertension)  stable    Class 3 severe obesity due to excess calories with serious comorbidity and body mass index (BMI) greater than or equal to 70 in adult  BMI 76.79    Hypothyroid Disease (with a low TSH/T4 that's suggest a Pituitary Etiology)      Thank you for your consult. I will follow-up with patient. Please contact us if you have any additional questions.   draw a prolactin level and ACTH start synthroid  50 mg po q day    Critical care time spent on the evaluation and treatment of severe organ dysfunction,  review of pertinent labs and imaging studies, discussions with consulting providers and discussions with patient/family: 61 minutes.     Kei Yao MD  Pulmonology   [facility] - Emergency Dept

## 2022-12-27 LAB — ACTH PLAS-MCNC: 9 PG/ML (ref 0–46)

## 2023-01-19 PROBLEM — Z86.718 HISTORY OF DVT (DEEP VEIN THROMBOSIS): Status: ACTIVE | Noted: 2023-01-19

## 2023-01-19 PROBLEM — Z79.01 CHRONIC ANTICOAGULATION: Status: ACTIVE | Noted: 2023-01-19

## 2023-01-19 PROBLEM — I42.9 CARDIOMYOPATHY: Status: ACTIVE | Noted: 2023-01-19

## 2023-01-19 PROBLEM — Z86.16 HISTORY OF COVID-19: Status: ACTIVE | Noted: 2023-01-19

## 2023-01-19 PROBLEM — I36.1 NONRHEUMATIC TRICUSPID VALVE REGURGITATION: Status: ACTIVE | Noted: 2023-01-19

## 2023-01-19 PROBLEM — I34.0 NONRHEUMATIC MITRAL VALVE REGURGITATION: Status: ACTIVE | Noted: 2023-01-19

## 2023-01-19 PROBLEM — I51.89 DIASTOLIC DYSFUNCTION: Status: ACTIVE | Noted: 2023-01-19

## 2023-01-19 PROBLEM — I50.20 HEART FAILURE WITH REDUCED EJECTION FRACTION, NYHA CLASS III: Status: ACTIVE | Noted: 2023-01-19

## 2023-01-19 PROBLEM — U07.1 COVID-19: Status: RESOLVED | Noted: 2022-02-07 | Resolved: 2023-01-19

## 2023-01-19 PROBLEM — R60.9 EDEMA: Status: ACTIVE | Noted: 2023-01-19

## 2023-01-19 PROBLEM — I10 PRIMARY HYPERTENSION: Status: ACTIVE | Noted: 2023-01-19

## 2023-01-19 PROBLEM — I51.7 LEFT VENTRICULAR ENLARGEMENT: Status: ACTIVE | Noted: 2023-01-19

## 2023-01-19 PROBLEM — I51.7 LEFT ATRIAL ENLARGEMENT: Status: ACTIVE | Noted: 2023-01-19

## 2023-02-19 DIAGNOSIS — U07.1 COVID-19 VIRUS DETECTED: ICD-10-CM

## 2023-03-27 PROBLEM — J96.12 CHRONIC RESPIRATORY FAILURE WITH HYPOXIA AND HYPERCAPNIA: Status: RESOLVED | Noted: 2022-02-17 | Resolved: 2023-03-27

## 2023-03-27 PROBLEM — J96.11 CHRONIC RESPIRATORY FAILURE WITH HYPOXIA AND HYPERCAPNIA: Status: RESOLVED | Noted: 2022-02-17 | Resolved: 2023-03-27

## 2023-05-19 PROBLEM — Z71.89 CPAP USE COUNSELING: Status: ACTIVE | Noted: 2023-05-19

## 2023-07-07 PROBLEM — R74.01 TRANSAMINITIS: Status: ACTIVE | Noted: 2023-07-07

## 2023-07-07 PROBLEM — I45.4 IVCD (INTRAVENTRICULAR CONDUCTION DEFECT): Status: ACTIVE | Noted: 2023-07-07

## 2024-06-18 PROBLEM — I82.403 ACUTE DEEP VEIN THROMBOSIS (DVT) OF BOTH LOWER EXTREMITIES: Status: RESOLVED | Noted: 2022-02-07 | Resolved: 2024-06-18

## 2024-06-18 PROBLEM — R60.9 EDEMA: Status: RESOLVED | Noted: 2023-01-19 | Resolved: 2024-06-18

## 2025-01-02 ENCOUNTER — HOSPITAL ENCOUNTER (EMERGENCY)
Facility: HOSPITAL | Age: 45
Discharge: HOME | End: 2025-01-02
Payer: MEDICAID

## 2025-01-02 VITALS
WEIGHT: 315 LBS | HEIGHT: 69 IN | OXYGEN SATURATION: 96 % | SYSTOLIC BLOOD PRESSURE: 136 MMHG | HEART RATE: 85 BPM | RESPIRATION RATE: 20 BRPM | DIASTOLIC BLOOD PRESSURE: 81 MMHG | BODY MASS INDEX: 46.65 KG/M2 | TEMPERATURE: 98 F

## 2025-01-02 DIAGNOSIS — M54.9 RIGHT-SIDED BACK PAIN, UNSPECIFIED BACK LOCATION, UNSPECIFIED CHRONICITY: Primary | ICD-10-CM

## 2025-01-02 PROCEDURE — 63600175 PHARM REV CODE 636 W HCPCS: Performed by: NURSE PRACTITIONER

## 2025-01-02 PROCEDURE — 99284 EMERGENCY DEPT VISIT MOD MDM: CPT | Mod: 25

## 2025-01-02 PROCEDURE — 96372 THER/PROPH/DIAG INJ SC/IM: CPT | Performed by: NURSE PRACTITIONER

## 2025-01-02 RX ORDER — CYCLOBENZAPRINE HCL 10 MG
10 TABLET ORAL 3 TIMES DAILY PRN
Qty: 15 TABLET | Refills: 0 | Status: SHIPPED | OUTPATIENT
Start: 2025-01-02 | End: 2025-01-07

## 2025-01-02 RX ORDER — DEXAMETHASONE SODIUM PHOSPHATE 4 MG/ML
8 INJECTION, SOLUTION INTRA-ARTICULAR; INTRALESIONAL; INTRAMUSCULAR; INTRAVENOUS; SOFT TISSUE
Status: COMPLETED | OUTPATIENT
Start: 2025-01-02 | End: 2025-01-02

## 2025-01-02 RX ORDER — KETOROLAC TROMETHAMINE 30 MG/ML
30 INJECTION, SOLUTION INTRAMUSCULAR; INTRAVENOUS
Status: COMPLETED | OUTPATIENT
Start: 2025-01-02 | End: 2025-01-02

## 2025-01-02 RX ORDER — KETOROLAC TROMETHAMINE 10 MG/1
10 TABLET, FILM COATED ORAL EVERY 6 HOURS
Qty: 20 TABLET | Refills: 0 | Status: SHIPPED | OUTPATIENT
Start: 2025-01-02 | End: 2025-01-07

## 2025-01-02 RX ADMIN — KETOROLAC TROMETHAMINE 30 MG: 30 INJECTION, SOLUTION INTRAMUSCULAR at 02:01

## 2025-01-02 RX ADMIN — DEXAMETHASONE SODIUM PHOSPHATE 8 MG: 4 INJECTION, SOLUTION INTRA-ARTICULAR; INTRALESIONAL; INTRAMUSCULAR; INTRAVENOUS; SOFT TISSUE at 02:01

## 2025-01-02 NOTE — ED PROVIDER NOTES
Encounter Date: 1/2/2025       History     Chief Complaint   Patient presents with    Back Pain     Pt c/o lower back pain approximately 3 weeks ago. States he bent over to  something. Does state he has a pinched nerve and bulging discs. Denies urinary symptoms.      Chief complaint: Back pain   44-year-old male with a history of COPD hypertension morbidly obese presents to be evaluated for low back pain he has had for the last several weeks.  Reports being done lifting something heavy.  States he has had previous injuries with a pinched nerve and bulging disc.  Denies any numbness or tingling lower extremities or any loss of bowel or bladder.  He has taken hydrocodone with minimal improvement in symptoms      Review of patient's allergies indicates:  No Known Allergies  Past Medical History:   Diagnosis Date    COPD (chronic obstructive pulmonary disease)     Hypertension     Obesity, unspecified     Pulmonary embolism 2022     Past Surgical History:   Procedure Laterality Date    ANKLE SURGERY Left      Family History   Problem Relation Name Age of Onset    Diabetes Mellitus Mother      Diabetes Mellitus Father      Hypertension Father      Stroke Paternal Grandfather       Social History     Tobacco Use    Smoking status: Some Days     Types: Cigars     Start date: 3/4/1996     Last attempt to quit: 1/1/2022     Years since quitting: 3.0    Smokeless tobacco: Never   Substance Use Topics    Alcohol use: Yes     Alcohol/week: 0.0 standard drinks of alcohol     Comment: occasional    Drug use: No     Review of Systems   Genitourinary:  Negative for difficulty urinating.   Musculoskeletal:  Positive for back pain and myalgias.   Neurological:  Negative for weakness and numbness.       Physical Exam     Initial Vitals [01/02/25 1210]   BP Pulse Resp Temp SpO2   136/81 85 20 98 °F (36.7 °C) 96 %      MAP       --         Physical Exam    Nursing note and vitals reviewed.  Constitutional: He appears  well-developed and well-nourished.   Cardiovascular:  Normal rate.           Musculoskeletal:         General: No tenderness or edema. Normal range of motion.     Neurological: He is alert and oriented to person, place, and time. He has normal strength. GCS score is 15. GCS eye subscore is 4. GCS verbal subscore is 5. GCS motor subscore is 6.   Skin: Skin is warm and dry.   Psychiatric: He has a normal mood and affect. Thought content normal.         ED Course   Procedures  Labs Reviewed - No data to display       Imaging Results              X-Ray Lumbar Spine Ap And Lateral (Final result)  Result time 01/02/25 14:01:44      Final result by Acosta Bailey MD (01/02/25 14:01:44)                   Impression:      1. Examination markedly degraded secondary to patient's body habitus.  2. No definite acute radiographic abnormalities noting aforementioned limitation.      Electronically signed by: Acosta Bailey MD  Date:    01/02/2025  Time:    14:01               Narrative:    EXAMINATION:  XR LUMBAR SPINE AP AND LATERAL    CLINICAL HISTORY:  back  pain;    TECHNIQUE:  AP, lateral and spot images were performed of the lumbar spine.    COMPARISON:  Radiograph 06/02/2018.    FINDINGS:  Examination markedly degraded secondary to patient body habitus.  Transitional lumbosacral anatomy.  Mild upper lumbar dextrocurvature and lower lumbar levocurvature.  No definite spondylolisthesis.  Vertebral body heights are well maintained.  No definite suspicious lytic or blastic lesions.  Multilevel degenerative disc space narrowing.  Facet joints are not well evaluated.  SI joints are symmetric.  Sacrum appears grossly unremarkable.  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                                       Medications   ketorolac injection 30 mg (has no administration in time range)   dexAMETHasone injection 8 mg (has no administration in time range)     Medical Decision Making  44-year-old male presents to be  evaluated for exacerbation of chronic back pain   Differential diagnoses include bulging disc, herniated disc, and was keeping    Amount and/or Complexity of Data Reviewed  Radiology: ordered.    Risk  Prescription drug management.  Risk Details: Patient with no signs of cauda equina or other concerning findings on ED   No point tenderness of the spinous surrounding tissues   Negative x-ray in ED   Patient is morbidly obese likely contributing to exacerbation of MSK pain   Stable for DC with supportive care of symptoms and referred to orthopedics   Given return precautions                                      Clinical Impression:  Final diagnoses:  [M54.9] Right-sided back pain, unspecified back location, unspecified chronicity (Primary)          ED Disposition Condition    Discharge Stable          ED Prescriptions       Medication Sig Dispense Start Date End Date Auth. Provider    cyclobenzaprine (FLEXERIL) 10 MG tablet Take 1 tablet (10 mg total) by mouth 3 (three) times daily as needed for Muscle spasms. 15 tablet 1/2/2025 1/7/2025 Mayi Hernandez NP    ketorolac (TORADOL) 10 mg tablet Take 1 tablet (10 mg total) by mouth every 6 (six) hours. for 5 days 20 tablet 1/2/2025 1/7/2025 Mayi Hernandez NP          Follow-up Information    None          Mayi Hernandez NP  01/02/25 6922

## 2025-01-24 PROBLEM — E87.79 CARDIAC VOLUME OVERLOAD: Status: ACTIVE | Noted: 2025-01-24

## 2025-01-24 PROBLEM — Z86.39 HISTORY OF HYPERGLYCEMIA: Chronic | Status: ACTIVE | Noted: 2025-01-24

## 2025-01-24 PROBLEM — Z86.39 HISTORY OF HYPERGLYCEMIA: Status: ACTIVE | Noted: 2025-01-24

## 2025-06-21 ENCOUNTER — HOSPITAL ENCOUNTER (EMERGENCY)
Facility: HOSPITAL | Age: 45
Discharge: HOME | End: 2025-06-21
Attending: STUDENT IN AN ORGANIZED HEALTH CARE EDUCATION/TRAINING PROGRAM
Payer: MEDICARE

## 2025-06-21 VITALS
HEART RATE: 84 BPM | DIASTOLIC BLOOD PRESSURE: 62 MMHG | SYSTOLIC BLOOD PRESSURE: 123 MMHG | TEMPERATURE: 98 F | RESPIRATION RATE: 16 BRPM | BODY MASS INDEX: 67.64 KG/M2 | WEIGHT: 315 LBS | OXYGEN SATURATION: 100 %

## 2025-06-21 DIAGNOSIS — R07.9 CHEST PAIN: ICD-10-CM

## 2025-06-21 LAB
ABSOLUTE EOSINOPHIL (OHS): 0.12 K/UL
ABSOLUTE MONOCYTE (OHS): 0.6 K/UL (ref 0.3–1)
ABSOLUTE NEUTROPHIL COUNT (OHS): 7.02 K/UL (ref 1.8–7.7)
ALBUMIN SERPL BCP-MCNC: 3.9 G/DL (ref 3.5–5.2)
ALP SERPL-CCNC: 80 UNIT/L (ref 40–150)
ALT SERPL W/O P-5'-P-CCNC: 29 UNIT/L (ref 10–44)
ANION GAP (OHS): 9 MMOL/L (ref 8–16)
APTT PPP: 29.7 SECONDS (ref 21–32)
AST SERPL-CCNC: 18 UNIT/L (ref 11–45)
BASOPHILS # BLD AUTO: 0.06 K/UL
BASOPHILS NFR BLD AUTO: 0.6 %
BILIRUB SERPL-MCNC: 0.7 MG/DL (ref 0.1–1)
BNP SERPL-MCNC: <10 PG/ML (ref 0–99)
BUN SERPL-MCNC: 10 MG/DL (ref 6–20)
CALCIUM SERPL-MCNC: 9.4 MG/DL (ref 8.7–10.5)
CHLORIDE SERPL-SCNC: 107 MMOL/L (ref 95–110)
CO2 SERPL-SCNC: 22 MMOL/L (ref 23–29)
CREAT SERPL-MCNC: 1.2 MG/DL (ref 0.5–1.4)
D DIMER PPP IA.FEU-MCNC: 0.33 MG/L FEU
ERYTHROCYTE [DISTWIDTH] IN BLOOD BY AUTOMATED COUNT: 13.4 % (ref 11.5–14.5)
GFR SERPLBLD CREATININE-BSD FMLA CKD-EPI: >60 ML/MIN/1.73/M2
GLUCOSE SERPL-MCNC: 107 MG/DL (ref 70–110)
HCT VFR BLD AUTO: 57 % (ref 40–54)
HGB BLD-MCNC: 18.7 GM/DL (ref 14–18)
IMM GRANULOCYTES # BLD AUTO: 0.05 K/UL (ref 0–0.04)
IMM GRANULOCYTES NFR BLD AUTO: 0.5 % (ref 0–0.5)
LYMPHOCYTES # BLD AUTO: 2.23 K/UL (ref 1–4.8)
MCH RBC QN AUTO: 29.6 PG (ref 27–31)
MCHC RBC AUTO-ENTMCNC: 32.8 G/DL (ref 32–36)
MCV RBC AUTO: 90 FL (ref 82–98)
NUCLEATED RBC (/100WBC) (OHS): 0 /100 WBC
OHS QRS DURATION: 88 MS
OHS QTC CALCULATION: 375 MS
PLATELET # BLD AUTO: 198 K/UL (ref 150–450)
PMV BLD AUTO: 10.3 FL (ref 9.2–12.9)
POTASSIUM SERPL-SCNC: 4.1 MMOL/L (ref 3.5–5.1)
PROT SERPL-MCNC: 8 GM/DL (ref 6–8.4)
RBC # BLD AUTO: 6.31 M/UL (ref 4.6–6.2)
RELATIVE EOSINOPHIL (OHS): 1.2 %
RELATIVE LYMPHOCYTE (OHS): 22.1 % (ref 18–48)
RELATIVE MONOCYTE (OHS): 6 % (ref 4–15)
RELATIVE NEUTROPHIL (OHS): 69.6 % (ref 38–73)
SODIUM SERPL-SCNC: 138 MMOL/L (ref 136–145)
TROPONIN I SERPL DL<=0.01 NG/ML-MCNC: 0.01 NG/ML
TROPONIN I SERPL DL<=0.01 NG/ML-MCNC: 0.01 NG/ML
WBC # BLD AUTO: 10.08 K/UL (ref 3.9–12.7)

## 2025-06-21 PROCEDURE — 84484 ASSAY OF TROPONIN QUANT: CPT | Performed by: STUDENT IN AN ORGANIZED HEALTH CARE EDUCATION/TRAINING PROGRAM

## 2025-06-21 PROCEDURE — 85025 COMPLETE CBC W/AUTO DIFF WBC: CPT | Performed by: STUDENT IN AN ORGANIZED HEALTH CARE EDUCATION/TRAINING PROGRAM

## 2025-06-21 PROCEDURE — 93005 ELECTROCARDIOGRAM TRACING: CPT

## 2025-06-21 PROCEDURE — 85379 FIBRIN DEGRADATION QUANT: CPT | Performed by: STUDENT IN AN ORGANIZED HEALTH CARE EDUCATION/TRAINING PROGRAM

## 2025-06-21 PROCEDURE — 99900035 HC TECH TIME PER 15 MIN (STAT)

## 2025-06-21 PROCEDURE — 83880 ASSAY OF NATRIURETIC PEPTIDE: CPT | Performed by: STUDENT IN AN ORGANIZED HEALTH CARE EDUCATION/TRAINING PROGRAM

## 2025-06-21 PROCEDURE — 99285 EMERGENCY DEPT VISIT HI MDM: CPT | Mod: 25

## 2025-06-21 PROCEDURE — 63600175 PHARM REV CODE 636 W HCPCS: Mod: JZ,TB | Performed by: STUDENT IN AN ORGANIZED HEALTH CARE EDUCATION/TRAINING PROGRAM

## 2025-06-21 PROCEDURE — 25000003 PHARM REV CODE 250: Performed by: STUDENT IN AN ORGANIZED HEALTH CARE EDUCATION/TRAINING PROGRAM

## 2025-06-21 PROCEDURE — 85730 THROMBOPLASTIN TIME PARTIAL: CPT | Performed by: STUDENT IN AN ORGANIZED HEALTH CARE EDUCATION/TRAINING PROGRAM

## 2025-06-21 PROCEDURE — 80053 COMPREHEN METABOLIC PANEL: CPT | Performed by: STUDENT IN AN ORGANIZED HEALTH CARE EDUCATION/TRAINING PROGRAM

## 2025-06-21 PROCEDURE — 93010 ELECTROCARDIOGRAM REPORT: CPT | Mod: ,,, | Performed by: INTERNAL MEDICINE

## 2025-06-21 PROCEDURE — 99900031 HC PATIENT EDUCATION (STAT)

## 2025-06-21 PROCEDURE — 96374 THER/PROPH/DIAG INJ IV PUSH: CPT

## 2025-06-21 RX ORDER — ASPIRIN 325 MG
325 TABLET ORAL
Status: COMPLETED | OUTPATIENT
Start: 2025-06-21 | End: 2025-06-21

## 2025-06-21 RX ORDER — KETOROLAC TROMETHAMINE 30 MG/ML
15 INJECTION, SOLUTION INTRAMUSCULAR; INTRAVENOUS
Status: COMPLETED | OUTPATIENT
Start: 2025-06-21 | End: 2025-06-21

## 2025-06-21 RX ADMIN — ASPIRIN 325 MG ORAL TABLET 325 MG: 325 PILL ORAL at 12:06

## 2025-06-21 RX ADMIN — KETOROLAC TROMETHAMINE 15 MG: 30 INJECTION, SOLUTION INTRAMUSCULAR; INTRAVENOUS at 01:06

## 2025-06-21 NOTE — Clinical Note
Alec Fernandez accompanied their spouse to the emergency department on 6/21/2025. They may return to work on 06/22/2025.      If you have any questions or concerns, please don't hesitate to call.      GAYLE Bassett RN

## 2025-06-21 NOTE — ED PROVIDER NOTES
Encounter Date: 6/21/2025       History     Chief Complaint   Patient presents with    Chest Pain     To ed with c/o of chest pain x 1 week. Pain started at posterior neck then radiated to rt chest with movement, now pain across chest. 8/10  sob on exertion      44-year-old male with history of COPD, hypertension, prior pulmonary embolism, CHF, presenting with one week of constant chest pain.  Patient reports that it begins in his right shoulder, and radiates to the middle of his chest.  Denies shortness of breath.  Reports occasional cough, reporting that the chest pain is worse when he coughs.  No fever or congestion.  Patient does take Lasix, denies any leg swelling or leg pain.  No other complaints.      Review of patient's allergies indicates:  No Known Allergies  Past Medical History:   Diagnosis Date    COPD (chronic obstructive pulmonary disease)     Hypertension     Obesity, unspecified     Pulmonary embolism 2022     Past Surgical History:   Procedure Laterality Date    ANKLE SURGERY Left      Family History   Problem Relation Name Age of Onset    Diabetes Mellitus Mother      Diabetes Mellitus Father      Hypertension Father      Stroke Paternal Grandfather       Social History[1]  Review of Systems   Constitutional:  Negative for fever.   HENT:  Negative for congestion and sore throat.    Respiratory:  Positive for cough. Negative for shortness of breath.    Cardiovascular:  Positive for chest pain.   Gastrointestinal:  Negative for nausea.   Genitourinary:  Negative for dysuria.   Musculoskeletal:  Negative for back pain.   Skin:  Negative for rash.   Neurological:  Negative for weakness.   Hematological:  Does not bruise/bleed easily.       Physical Exam     Initial Vitals [06/21/25 0015]   BP Pulse Resp Temp SpO2   (!) 161/82 85 20 98.1 °F (36.7 °C) 98 %      MAP       --         Physical Exam    Nursing note and vitals reviewed.  Constitutional: He appears well-developed.   Eyes: EOM are normal.    Neck:   Normal range of motion.  Cardiovascular:            No murmur heard.  Pulmonary/Chest: No respiratory distress.   Clear lungs bilaterally.  No respiratory distress.  No wheezing or rales.  Good air movement.     Abdominal: He exhibits no distension.   Musculoskeletal:         General: Normal range of motion.      Cervical back: Normal range of motion.     Neurological: He is alert.   Skin: Skin is warm.   Psychiatric: He has a normal mood and affect.         ED Course   Procedures  Labs Reviewed   CBC W/ AUTO DIFFERENTIAL    Narrative:     The following orders were created for panel order Complete Blood Count (CBC).  Procedure                               Abnormality         Status                     ---------                               -----------         ------                     CBC with Differential[[id number]]                                                        Please view results for these tests on the individual orders.   B-TYPE NATRIURETIC PEPTIDE   COMPREHENSIVE METABOLIC PANEL   TROPONIN I   APTT   CBC WITH DIFFERENTIAL   D DIMER, QUANTITATIVE     EKG Readings: (Independently Interpreted)   Initial Reading: No STEMI. Rhythm: Normal Sinus Rhythm. Heart Rate: 69. Ectopy: No Ectopy. Conduction: Normal.       Imaging Results    None          Medications - No data to display  Medical Decision Making  DDX: R/o ACS given history, risk factor analysis. R/o PNA/PTX.  Rule out pulmonary embolism. Unlikely pericarditis/myocarditis given history, risk factor analysis, no rubs, no EKG findings consistent with this. Unlikely aortic pathology given history, risk factor analysis, but will screen with CXR.   DX: BMP, CBC, CXR, EKG. Serial troponin.  TX: ASA. Analgesia PRN. Treatment, consult as indicated by studies.   DISPO:  Pending workup        Amount and/or Complexity of Data Reviewed  Labs: ordered.  Radiology: ordered.                                      Clinical Impression:  Final  diagnoses:  [R07.9] Chest pain                       [1]   Social History  Tobacco Use    Smoking status: Some Days     Types: Cigars     Start date: 3/4/1996     Last attempt to quit: 1/1/2022     Years since quitting: 3.4    Smokeless tobacco: Never   Substance Use Topics    Alcohol use: Yes     Alcohol/week: 0.0 standard drinks of alcohol     Comment: occasional    Drug use: No        Micha Mitchell MD  06/21/25 0029

## 2025-06-23 PROBLEM — Z91.148 NONCOMPLIANCE WITH MEDICATION REGIMEN: Status: ACTIVE | Noted: 2025-06-23

## 2025-06-23 PROBLEM — M25.511 ACUTE PAIN OF RIGHT SHOULDER: Status: ACTIVE | Noted: 2025-06-23

## 2025-06-23 PROBLEM — Z91.89 MULTIPLE RISK FACTORS FOR CORONARY ARTERY DISEASE: Status: ACTIVE | Noted: 2025-06-23

## 2025-06-23 PROBLEM — D75.1 POLYCYTHEMIA: Status: ACTIVE | Noted: 2025-06-23

## 2025-06-23 PROBLEM — Z72.0 TOBACCO ABUSE: Status: ACTIVE | Noted: 2025-06-23

## 2025-06-23 PROBLEM — M54.2 NECK PAIN: Status: ACTIVE | Noted: 2025-06-23
